# Patient Record
Sex: FEMALE | Race: WHITE | NOT HISPANIC OR LATINO | Employment: OTHER
[De-identification: names, ages, dates, MRNs, and addresses within clinical notes are randomized per-mention and may not be internally consistent; named-entity substitution may affect disease eponyms.]

---

## 2018-09-14 ENCOUNTER — RECORDS - HEALTHEAST (OUTPATIENT)
Dept: ADMINISTRATIVE | Facility: OTHER | Age: 68
End: 2018-09-14

## 2018-09-17 ASSESSMENT — MIFFLIN-ST. JEOR: SCORE: 1571.42

## 2018-09-19 ASSESSMENT — MIFFLIN-ST. JEOR: SCORE: 1571.42

## 2018-09-25 ENCOUNTER — SURGERY - HEALTHEAST (OUTPATIENT)
Dept: SURGERY | Facility: CLINIC | Age: 68
End: 2018-09-25

## 2018-09-25 ENCOUNTER — ANESTHESIA - HEALTHEAST (OUTPATIENT)
Dept: SURGERY | Facility: CLINIC | Age: 68
End: 2018-09-25

## 2018-09-25 ASSESSMENT — MIFFLIN-ST. JEOR
SCORE: 1570.51
SCORE: 1566.88

## 2018-11-09 ENCOUNTER — PRE VISIT (OUTPATIENT)
Dept: OTOLARYNGOLOGY | Facility: CLINIC | Age: 68
End: 2018-11-09

## 2018-11-09 NOTE — TELEPHONE ENCOUNTER
Patient is using an oral device for sleep but the appliance is not working well. Can't use a CPAP. Would like to see Dr Escoto to talk about options.  Demetra Mccarthy CMA ([facility])

## 2018-11-12 ENCOUNTER — OFFICE VISIT (OUTPATIENT)
Dept: OTOLARYNGOLOGY | Facility: CLINIC | Age: 68
End: 2018-11-12
Payer: COMMERCIAL

## 2018-11-12 VITALS — DIASTOLIC BLOOD PRESSURE: 87 MMHG | HEART RATE: 98 BPM | SYSTOLIC BLOOD PRESSURE: 128 MMHG

## 2018-11-12 DIAGNOSIS — G47.33 OSA (OBSTRUCTIVE SLEEP APNEA): Primary | ICD-10-CM

## 2018-11-12 PROCEDURE — 99202 OFFICE O/P NEW SF 15 MIN: CPT | Performed by: OTOLARYNGOLOGY

## 2018-11-12 RX ORDER — METOPROLOL SUCCINATE 100 MG/1
100 TABLET, EXTENDED RELEASE ORAL DAILY
COMMUNITY
Start: 2017-11-07

## 2018-11-12 RX ORDER — DEXTROAMPHETAMINE SACCHARATE, AMPHETAMINE ASPARTATE MONOHYDRATE, DEXTROAMPHETAMINE SULFATE AND AMPHETAMINE SULFATE 3.75; 3.75; 3.75; 3.75 MG/1; MG/1; MG/1; MG/1
15 CAPSULE, EXTENDED RELEASE ORAL
COMMUNITY
End: 2022-12-13

## 2018-11-12 RX ORDER — IMIPRAMINE HCL 25 MG
50 TABLET ORAL
COMMUNITY
End: 2022-12-14

## 2018-11-12 RX ORDER — POTASSIUM CHLORIDE 1500 MG/1
20 TABLET, EXTENDED RELEASE ORAL
COMMUNITY
Start: 2018-04-06 | End: 2022-12-14

## 2018-11-12 RX ORDER — LORAZEPAM 1 MG/1
1 TABLET ORAL DAILY PRN
COMMUNITY

## 2018-11-12 RX ORDER — AMLODIPINE BESYLATE 5 MG/1
5 TABLET ORAL EVERY EVENING
Status: ON HOLD | COMMUNITY
End: 2023-03-29

## 2018-11-12 RX ORDER — BUPROPION HYDROCHLORIDE 200 MG/1
200 TABLET, EXTENDED RELEASE ORAL DAILY
COMMUNITY

## 2018-11-12 RX ORDER — ALBUTEROL SULFATE 90 UG/1
2 AEROSOL, METERED RESPIRATORY (INHALATION)
COMMUNITY
End: 2022-12-14

## 2018-11-12 RX ORDER — MONTELUKAST SODIUM 10 MG/1
TABLET ORAL
COMMUNITY
Start: 2018-10-23 | End: 2022-12-14

## 2018-11-12 RX ORDER — TERAZOSIN 1 MG/1
1 CAPSULE ORAL
COMMUNITY
End: 2022-12-13

## 2018-11-12 RX ORDER — SODIUM SULFACETAMIDE 100 MG/ML
LIQUID TOPICAL DAILY PRN
Status: ON HOLD | COMMUNITY
End: 2022-12-16

## 2018-11-12 RX ORDER — TERAZOSIN 2 MG/1
2 CAPSULE ORAL
COMMUNITY
End: 2022-12-13

## 2018-11-12 RX ORDER — DEXTROAMPHETAMINE SACCHARATE, AMPHETAMINE ASPARTATE, DEXTROAMPHETAMINE SULFATE AND AMPHETAMINE SULFATE 3.75; 3.75; 3.75; 3.75 MG/1; MG/1; MG/1; MG/1
15 TABLET ORAL
COMMUNITY
End: 2018-11-12

## 2018-11-12 RX ORDER — OMEPRAZOLE AND SODIUM BICARBONATE 40; 1100 MG/1; MG/1
1 CAPSULE ORAL
COMMUNITY
Start: 2014-02-19

## 2018-11-12 RX ORDER — LOSARTAN POTASSIUM AND HYDROCHLOROTHIAZIDE 25; 100 MG/1; MG/1
1 TABLET ORAL
COMMUNITY
Start: 2017-11-07 | End: 2022-12-13

## 2018-11-12 RX ORDER — HYDROXYZINE PAMOATE 25 MG/1
25 CAPSULE ORAL
COMMUNITY
Start: 2018-09-27 | End: 2022-12-14

## 2018-11-12 RX ORDER — POLYETHYLENE GLYCOL 3350 17 G/17G
17 POWDER, FOR SOLUTION ORAL
COMMUNITY

## 2018-11-12 RX ORDER — ASPIRIN 81 MG/1
81 TABLET, CHEWABLE ORAL DAILY
Status: ON HOLD | COMMUNITY
End: 2022-12-16

## 2018-11-12 RX ORDER — FEXOFENADINE HCL 180 MG/1
180 TABLET ORAL
COMMUNITY
End: 2022-12-14

## 2018-11-12 RX ORDER — DULOXETIN HYDROCHLORIDE 30 MG/1
30 CAPSULE, DELAYED RELEASE ORAL
COMMUNITY
End: 2022-12-13

## 2018-11-12 RX ORDER — ARIPIPRAZOLE 2 MG/1
2 TABLET ORAL EVERY EVENING
COMMUNITY

## 2018-11-12 RX ORDER — TRIAMCINOLONE ACETONIDE 1 MG/G
CREAM TOPICAL 2 TIMES DAILY PRN
COMMUNITY
Start: 2017-10-05 | End: 2023-03-24

## 2018-11-12 RX ORDER — DULOXETIN HYDROCHLORIDE 60 MG/1
60 CAPSULE, DELAYED RELEASE ORAL
COMMUNITY
End: 2022-12-13

## 2018-11-12 RX ORDER — HYDROCORTISONE 25 MG/G
OINTMENT TOPICAL 2 TIMES DAILY PRN
COMMUNITY
Start: 2017-11-07

## 2018-11-12 ASSESSMENT — PAIN SCALES - GENERAL: PAINLEVEL: NO PAIN (0)

## 2018-11-12 NOTE — PROGRESS NOTES
Otolaryngology Adult Consultation    Patient: Katie Kramer  : 1950        HPI:  Katie Kramer is a 68 year old female seen today in the Otolaryngology Clinic for obstructive sleep apnea.  Patient was diagnosed with moderate obstructive sleep apnea on 2017.  This was done as a split-night study at [facility].  The overall AHI was 16.8.  She slept exclusively in the supine position.  Lowest oxygen saturation was 89%.  She had no central or mixed events.  Patient was diagnosed previous to this but this is her most recent study.  Patient declined CPAP because she has issues with claustrophobia as well as obsessive-compulsive disorder she felt like it would be incredibly difficult for her to try CPAP.  Therefore she was tried on an oral appliance.  She reports that the oral appliance has worked really well for her.  Repeat sleep study with oral appliance in place showed sick no significant sleep apnea.  However over time the oral appliance is not fitting on her lower teeth very well.  Therefore she is looking to try to get a new one made.  Her previous dentist has since retired.  This was Dr. Mara Vazquez.  Additionally the patient is on Medicare now.    Medications:  Current Outpatient Rx   Medication Sig Dispense Refill     hydrocortisone 2.5 % cream Apply bid prn to rash on thin skin areas.       hydrOXYzine (VISTARIL) 25 MG capsule Take 25 mg by mouth       losartan-hydrochlorothiazide (HYZAAR) 100-25 MG per tablet Take 1 tablet by mouth       metoprolol succinate (TOPROL-XL) 50 MG 24 hr tablet Take 25 mg by mouth       montelukast (SINGULAIR) 10 MG tablet TAKE 1 TABLET BY MOUTH DAILY AT BEDTIME       Multiple Vitamins-Minerals (PRESERVISION/LUTEIN PO)        omeprazole-sodium bicarbonate (ZEGERID)  MG per capsule Take 1 capsule by mouth       order for DME Equipment being ordered: Mandibular advancement device for treatment of moderate obstructive sleep apnea (AHI 16,  PSG 7/16/2017 at [facility]) 1 Units 0     potassium chloride SA (K-DUR/KLOR-CON M) 20 MEQ CR tablet Take 20 mEq by mouth       triamcinolone (KENALOG) 0.1 % cream to affected areas. Do not use on face, axilla or genital area       albuterol (PROAIR HFA/PROVENTIL HFA/VENTOLIN HFA) 108 (90 Base) MCG/ACT inhaler Inhale 2 puffs into the lungs       amLODIPine (NORVASC) 5 MG tablet Take 5 mg by mouth       amphetamine-dextroamphetamine (ADDERALL XR) 15 MG per 24 hr capsule Take 15 mg by mouth       ARIPiprazole (ABILIFY) 5 MG tablet Take 5 mg by mouth       ARMODAFINIL PO Take 15 mg by mouth       aspirin 81 MG chewable tablet Take 81 mg by mouth       buPROPion (WELLBUTRIN SR) 200 MG 12 hr tablet Take 200 mg by mouth       cholecalciferol (VITAMIN D-1000 MAX ST) 1000 units TABS Take 1,000 Units by mouth       DULoxetine (CYMBALTA) 30 MG EC capsule Take 30 mg by mouth       DULoxetine (CYMBALTA) 60 MG EC capsule Take 60 mg by mouth       fexofenadine (ALLEGRA) 180 MG tablet Take 180 mg by mouth       imipramine (TOFRANIL) 25 MG tablet Take 50 mg by mouth       LORazepam (ATIVAN) 1 MG tablet Take 1 mg by mouth       polyethylene glycol (MIRALAX/GLYCOLAX) powder Take 17 g by mouth       Sulfacetamide Sodium 10 % LIQD        terazosin (HYTRIN) 1 MG capsule Take 1 mg by mouth       terazosin (HYTRIN) 2 MG capsule Take 2 mg by mouth         Allergies: Seasonal allergies     PMH:  Past Medical History:   Diagnosis Date     Anxiety      Arthritis      Chronic fatigue      Eosinophilic esophagitis      GERD (gastroesophageal reflux disease)      Hypertension      Macular degeneration      Perspiration excessive      Rosacea      Sleep apnea      Uncomplicated asthma        PSH:  Past Surgical History:   Procedure Laterality Date     CARPAL TUNNEL RELEASE RT/LT      knee replacement     GYN SURGERY      hysterectomy     PARATHYROIDECTOMY         FH:  History reviewed. No pertinent family history.     SH:  Social History    Substance Use Topics     Smoking status: Never Smoker     Smokeless tobacco: Never Used     Alcohol use No       Review of Systems  No flowsheet data found.    Physical Exam:    GEN:  The patient is alert, oriented and in no acute distress.    Assessment/Plan: Patient has moderate obstructive sleep apnea that responds well to oral appliance therapy.  I did make a referral to sleep dentistry for her today.  There are several dentist who do take Medicare and I provided her with a list today.  At this time I would recommend that she continue with oral appliance therapy since he has proven to be effective for her in the past.  She may follow-up with me as needed.    I spent a total of 25 minutes face-to-face with Katie Kramer during today's office visit.  Over 50% of this time was spent counseling the patient on and/or coordinating care as documented in my assessment and plan.

## 2018-11-12 NOTE — LETTER
2018         RE: Katie Kramer  [address] [place] [address]   [facility] [address]        Dear Colleague,    Thank you for referring your patient, Katie Kramer, to the [facility]. Please see a copy of my visit note below.          Otolaryngology Adult Consultation    Patient: Katie Kramer  : 1950        HPI:  Katie Kramer is a 68 year old female seen today in the Otolaryngology Clinic for obstructive sleep apnea.  Patient was diagnosed with moderate obstructive sleep apnea on 2017.  This was done as a split-night study at [facility].  The overall AHI was 16.8.  She slept exclusively in the supine position.  Lowest oxygen saturation was 89%.  She had no central or mixed events.  Patient was diagnosed previous to this but this is her most recent study.  Patient declined CPAP because she has issues with claustrophobia as well as obsessive-compulsive disorder she felt like it would be incredibly difficult for her to try CPAP.  Therefore she was tried on an oral appliance.  She reports that the oral appliance has worked really well for her.  Repeat sleep study with oral appliance in place showed sick no significant sleep apnea.  However over time the oral appliance is not fitting on her lower teeth very well.  Therefore she is looking to try to get a new one made.  Her previous dentist has since retired.  This was Dr. Mara Vazquez.  Additionally the patient is on Medicare now.    Medications:  Current Outpatient Rx   Medication Sig Dispense Refill     hydrocortisone 2.5 % cream Apply bid prn to rash on thin skin areas.       hydrOXYzine (VISTARIL) 25 MG capsule Take 25 mg by mouth       losartan-hydrochlorothiazide (HYZAAR) 100-25 MG per tablet Take 1 tablet by mouth       metoprolol succinate (TOPROL-XL) 50 MG 24 hr tablet Take 25 mg by mouth       montelukast (SINGULAIR) 10 MG tablet TAKE 1 TABLET BY MOUTH DAILY AT BEDTIME       Multiple Vitamins-Minerals  (PRESERVISION/LUTEIN PO)        omeprazole-sodium bicarbonate (ZEGERID)  MG per capsule Take 1 capsule by mouth       order for DME Equipment being ordered: Mandibular advancement device for treatment of moderate obstructive sleep apnea (AHI 16, PSG 7/16/2017 at [facility]) 1 Units 0     potassium chloride SA (K-DUR/KLOR-CON M) 20 MEQ CR tablet Take 20 mEq by mouth       triamcinolone (KENALOG) 0.1 % cream to affected areas. Do not use on face, axilla or genital area       albuterol (PROAIR HFA/PROVENTIL HFA/VENTOLIN HFA) 108 (90 Base) MCG/ACT inhaler Inhale 2 puffs into the lungs       amLODIPine (NORVASC) 5 MG tablet Take 5 mg by mouth       amphetamine-dextroamphetamine (ADDERALL XR) 15 MG per 24 hr capsule Take 15 mg by mouth       ARIPiprazole (ABILIFY) 5 MG tablet Take 5 mg by mouth       ARMODAFINIL PO Take 15 mg by mouth       aspirin 81 MG chewable tablet Take 81 mg by mouth       buPROPion (WELLBUTRIN SR) 200 MG 12 hr tablet Take 200 mg by mouth       cholecalciferol (VITAMIN D-1000 MAX ST) 1000 units TABS Take 1,000 Units by mouth       DULoxetine (CYMBALTA) 30 MG EC capsule Take 30 mg by mouth       DULoxetine (CYMBALTA) 60 MG EC capsule Take 60 mg by mouth       fexofenadine (ALLEGRA) 180 MG tablet Take 180 mg by mouth       imipramine (TOFRANIL) 25 MG tablet Take 50 mg by mouth       LORazepam (ATIVAN) 1 MG tablet Take 1 mg by mouth       polyethylene glycol (MIRALAX/GLYCOLAX) powder Take 17 g by mouth       Sulfacetamide Sodium 10 % LIQD        terazosin (HYTRIN) 1 MG capsule Take 1 mg by mouth       terazosin (HYTRIN) 2 MG capsule Take 2 mg by mouth         Allergies: Seasonal allergies     PMH:  Past Medical History:   Diagnosis Date     Anxiety      Arthritis      Chronic fatigue      Eosinophilic esophagitis      GERD (gastroesophageal reflux disease)      Hypertension      Macular degeneration      Perspiration excessive      Rosacea      Sleep apnea      Uncomplicated asthma         PSH:  Past Surgical History:   Procedure Laterality Date     CARPAL TUNNEL RELEASE RT/LT      knee replacement     GYN SURGERY      hysterectomy     PARATHYROIDECTOMY         FH:  History reviewed. No pertinent family history.     SH:  Social History   Substance Use Topics     Smoking status: Never Smoker     Smokeless tobacco: Never Used     Alcohol use No       Review of Systems  No flowsheet data found.    Physical Exam:    GEN:  The patient is alert, oriented and in no acute distress.    Assessment/Plan: Patient has moderate obstructive sleep apnea that responds well to oral appliance therapy.  I did make a referral to sleep dentistry for her today.  There are several dentist who do take Medicare and I provided her with a list today.  At this time I would recommend that she continue with oral appliance therapy since he has proven to be effective for her in the past.  She may follow-up with me as needed.    I spent a total of 25 minutes face-to-face with Katie Kramer during today's office visit.  Over 50% of this time was spent counseling the patient on and/or coordinating care as documented in my assessment and plan.          Again, thank you for allowing me to participate in the care of your patient.        Sincerely,        Kya Escoto MD

## 2018-11-12 NOTE — MR AVS SNAPSHOT
After Visit Summary   11/12/2018    Katie Kramer    MRN: [id number]           Patient Information     Date Of Birth          1950        Visit Information        Provider Department      11/12/2018 12:45 PM Kya Escoto MD [facility]        Today's Diagnoses     KERLINE (obstructive sleep apnea)    -  1       Follow-ups after your visit        Additional Services     SLEEP DENTAL REFERRAL       Dental appliance resources recommended by [facility]     Below is a list of Medicare-approved dental appliance resources recommended by [facility].  If you wish to choose your own dental sleep dentist, you may identify a provider close to you: [email or web address]    [facility]   Office 1   Jamir Schultz DDS - [DME Medicare]  [address]   [place] [address]  Appointments: [phone]  Fax: [phone]  Website: [employer]    [facility]   Office 2  Jamir Schultz DDS - [DME Medicare]  [address]  [place] [address]  Appointments: [phone]  Fax: [phone]  Website: [employer]    [facility]   [place] Office   Austen Montoya DDS, MS - [DME Medicare]  [facility]   [address], [address]   [place] [address]   Appointments: [phone]   Fax: [phone]   Website: [employer]     Chad Zaragoza DMD, MSD - [DME Medicare]  [address], [address]  [place] [address]  Appointments [phone]  Fax: [phone]  Website: [email or web address]    If you wish to choose your own dental sleep dentist, you may identify a provider close to you: [email or web address]      AHI: 16.8 PSG DATE: 7/16/2017     Please refer patient to sleep medicine for home sleep study if needed.    Other information regarding this referral: Mandibular repositioning appliance  for KERLINE. If your insurance asks for a CPT code, it is .    Please be aware that coverage of these services is subject to the terms and limitations of your health insurance plan.  Call member services at your health plan with any benefit or coverage questions.      Please bring the following to your appointment:    >>   List of current medications   >>   This referral request   >>   Any documents/labs given to you for this referral                  Who to contact     If you have questions or need follow up information about today's clinic visit or your schedule please contact [facility] directly at [phone].  Normal or non-critical lab and imaging results will be communicated to you by [employer], letter or phone within 4 business days after the clinic has received the results. If you do not hear from us within 7 days, please contact the clinic through [employer] or phone. If you have a critical or abnormal lab result, we will notify you by phone as soon as possible.  Submit refill requests through [employer] or call your pharmacy and they will forward the refill request to us. Please allow 3 business days for your refill to be completed.          Additional Information About Your Visit        [employer] Information     [employer] is an electronic gateway that provides easy, online access to your medical records. With [employer], you can request a clinic appointment, read your test results, renew a prescription or communicate with your care team.     To sign up for [employer] visit the website at [email or web address]   You will be asked to enter the access code listed below, as well as some personal information. Please follow the directions to create your username and password.     Your access code is: [email or web address]  Expires: 2/10/2019  1:05 PM     Your access code will  in 90 days. If you need help or a new code, please contact your [facility] or call [phone] for  assistance.        Care EveryWhere ID     This is your Care EveryWhere ID. This could be used by other organizations to access your [facility] medical records  [id number]        Your Vitals Were     Pulse                   98            Blood Pressure from Last 3 Encounters:   11/12/18 128/87    Weight from Last 3 Encounters:   No data found for Wt              We Performed the Following     SLEEP DENTAL REFERRAL        Primary Care Provider Office Phone # Fax #    Aleksandra Crump, [phone]       [facility] [address]  [facility] [place] [address]        Equal Access to Services     [facility]: Hadii aad ku hadasho Soomaali, waaxda luqadaha, qaybta kaalmada adeegyada, waxay idiin hayaan adeeg kharaduncan lacecil . So [facility] [phone].    ATENCIÓN: Si habla español, tiene a reynolds disposición servicios gratuitos de asistencia lingüística. [facility] [phone].    We comply with applicable federal civil rights laws and [place] laws. We do not discriminate on the basis of race, color, national origin, age, disability, sex, sexual orientation, or gender identity.            Thank you!     Thank you for choosing [facility]  for your care. Our goal is always to provide you with excellent care. Hearing back from our patients is one way we can continue to improve our services. Please take a few minutes to complete the written survey that you may receive in the mail after your visit with us. Thank you!             Your Updated Medication List - Protect others around you: Learn how to safely use, store and throw away your medicines at www.disposemymeds.org.          This list is accurate as of 11/12/18  1:05 PM.  Always use your most recent med list.                   Brand Name Dispense Instructions for use Diagnosis    albuterol 108 (90 Base) MCG/ACT inhaler    PROAIR HFA/PROVENTIL HFA/VENTOLIN HFA     Inhale 2 puffs into the lungs        amLODIPine 5 MG tablet     NORVASC     Take 5 mg by mouth        amphetamine-dextroamphetamine 15 MG per 24 hr capsule    ADDERALL XR     Take 15 mg by mouth        ARIPiprazole 5 MG tablet    ABILIFY     Take 5 mg by mouth        ARMODAFINIL PO      Take 15 mg by mouth        aspirin 81 MG chewable tablet      Take 81 mg by mouth        buPROPion 200 MG 12 hr tablet    WELLBUTRIN SR     Take 200 mg by mouth        * DULoxetine 30 MG EC capsule    CYMBALTA     Take 30 mg by mouth        * DULoxetine 60 MG EC capsule    CYMBALTA     Take 60 mg by mouth        fexofenadine 180 MG tablet    ALLEGRA     Take 180 mg by mouth        hydrocortisone 2.5 % cream      Apply bid prn to rash on thin skin areas.        imipramine 25 MG tablet    TOFRANIL     Take 50 mg by mouth        LORazepam 1 MG tablet    ATIVAN     Take 1 mg by mouth        losartan-hydrochlorothiazide 100-25 MG per tablet    HYZAAR     Take 1 tablet by mouth        metoprolol succinate 50 MG 24 hr tablet    TOPROL-XL     Take 25 mg by mouth        montelukast 10 MG tablet    SINGULAIR     TAKE 1 TABLET BY MOUTH DAILY AT BEDTIME        omeprazole-sodium bicarbonate  MG per capsule    ZEGERID     Take 1 capsule by mouth        polyethylene glycol powder    MIRALAX/GLYCOLAX     Take 17 g by mouth        potassium chloride SA 20 MEQ CR tablet    K-DUR/KLOR-CON M     Take 20 mEq by mouth        PRESERVISION/LUTEIN PO           Sulfacetamide Sodium 10 % Liqd           * terazosin 1 MG capsule    HYTRIN     Take 1 mg by mouth        * terazosin 2 MG capsule    HYTRIN     Take 2 mg by mouth        triamcinolone 0.1 % cream    KENALOG     to affected areas. Do not use on face, axilla or genital area        VISTARIL 25 MG capsule   Generic drug:  hydrOXYzine      Take 25 mg by mouth        VITAMIN D-1000 MAX ST 1000 units Tabs   Generic drug:  cholecalciferol      Take 1,000 Units by mouth        * Notice:  This list has 4 medication(s) that are the same as other medications prescribed  for you. Read the directions carefully, and ask your doctor or other care provider to review them with you.

## 2018-11-16 ENCOUNTER — RECORDS - HEALTHEAST (OUTPATIENT)
Dept: ADMINISTRATIVE | Facility: OTHER | Age: 68
End: 2018-11-16

## 2018-11-20 ASSESSMENT — MIFFLIN-ST. JEOR: SCORE: 1575.95

## 2018-11-27 ENCOUNTER — SURGERY - HEALTHEAST (OUTPATIENT)
Dept: SURGERY | Facility: CLINIC | Age: 68
End: 2018-11-27

## 2018-11-27 ENCOUNTER — ANESTHESIA - HEALTHEAST (OUTPATIENT)
Dept: SURGERY | Facility: CLINIC | Age: 68
End: 2018-11-27

## 2018-11-27 ASSESSMENT — MIFFLIN-ST. JEOR
SCORE: 1575.95
SCORE: 1575.95

## 2021-06-02 VITALS — WEIGHT: 235 LBS | BODY MASS INDEX: 40.12 KG/M2 | HEIGHT: 64 IN

## 2021-06-02 VITALS — WEIGHT: 233 LBS | BODY MASS INDEX: 39.78 KG/M2 | HEIGHT: 64 IN

## 2021-06-05 ENCOUNTER — RECORDS - HEALTHEAST (OUTPATIENT)
Dept: SCHEDULING | Facility: CLINIC | Age: 71
End: 2021-06-05

## 2021-06-05 DIAGNOSIS — R07.9 CHEST PAIN: ICD-10-CM

## 2021-06-07 ENCOUNTER — RECORDS - HEALTHEAST (OUTPATIENT)
Dept: ADMINISTRATIVE | Facility: OTHER | Age: 71
End: 2021-06-07

## 2021-06-07 ASSESSMENT — MIFFLIN-ST. JEOR: SCORE: 1636.74

## 2021-06-08 ENCOUNTER — SURGERY - HEALTHEAST (OUTPATIENT)
Dept: CARDIOLOGY | Facility: HOSPITAL | Age: 71
End: 2021-06-08

## 2021-06-08 ENCOUNTER — RECORDS - HEALTHEAST (OUTPATIENT)
Dept: ADMINISTRATIVE | Facility: OTHER | Age: 71
End: 2021-06-08

## 2021-06-08 ENCOUNTER — COMMUNICATION - HEALTHEAST (OUTPATIENT)
Dept: SCHEDULING | Facility: CLINIC | Age: 71
End: 2021-06-08

## 2021-06-08 ASSESSMENT — MIFFLIN-ST. JEOR
SCORE: 1628.12
SCORE: 1632.2
SCORE: 1628.12

## 2021-06-09 ENCOUNTER — RECORDS - HEALTHEAST (OUTPATIENT)
Dept: ADMINISTRATIVE | Facility: OTHER | Age: 71
End: 2021-06-09

## 2021-06-09 ASSESSMENT — MIFFLIN-ST. JEOR: SCORE: 1626.31

## 2021-06-10 ENCOUNTER — RECORDS - HEALTHEAST (OUTPATIENT)
Dept: ADMINISTRATIVE | Facility: OTHER | Age: 71
End: 2021-06-10

## 2021-06-10 ASSESSMENT — MIFFLIN-ST. JEOR: SCORE: 1605.44

## 2021-06-15 ENCOUNTER — COMMUNICATION - HEALTHEAST (OUTPATIENT)
Dept: CARDIOLOGY | Facility: CLINIC | Age: 71
End: 2021-06-15

## 2021-06-15 DIAGNOSIS — I47.20 VENTRICULAR TACHYCARDIA (H): ICD-10-CM

## 2021-06-20 NOTE — ANESTHESIA CARE TRANSFER NOTE
Last vitals:   Vitals:    09/25/18 1537   BP: 131/79   Pulse: 94   Resp: 16   Temp: 36.7  C (98  F)   SpO2: 100%     Patient's level of consciousness is awake  Spontaneous respirations: yes  Maintains airway independently: yes  Dentition unchanged: yes  Oropharynx: oropharynx clear of all foreign objects    QCDR Measures:  ASA# 20 - Surgical Safety Checklist: WHO surgical safety checklist completed prior to induction  PQRS# 430 - Adult PONV Prevention: 4558F - Pt received => 2 anti-emetic agents (different classes) preop & intraop  ASA# 8 - Peds PONV Prevention: NA - Not pediatric patient, not GA or 2 or more risk factors NOT present  PQRS# 424 - Madeline-op Temp Management: 4559F - At least one body temp DOCUMENTED => 35.5C or 95.9F within required timeframe  PQRS# 426 - PACU Transfer Protocol: - Transfer of care checklist used  ASA# 14 - Acute Post-op Pain: ASA14B - Patient did NOT experience pain >= 7 out of 10

## 2021-06-20 NOTE — ANESTHESIA PROCEDURE NOTES
Peripheral Block    Patient location during procedure: pre-op  Start time: 9/25/2018 12:47 PM  End time: 9/25/2018 12:51 PM  post-op analgesia per surgeon order as noted in medical record  Staffing:  Performing  Anesthesiologist: MICKY CHEUNG  Preanesthetic Checklist  Completed: patient identified, site marked, risks, benefits, and alternatives discussed, timeout performed, consent obtained, airway assessed, oxygen available, suction available, emergency drugs available and hand hygiene performed  Peripheral Block  Block type: saphenous, adductor canal block  Prep: ChloraPrep  Patient position: supine  Patient monitoring: cardiac monitor, continuous pulse oximetry, heart rate and blood pressure  Laterality: left  Injection technique: ultrasound guided    Ultrasound used to visualize needle placement in proximity to nerve being blocked: yes   Permanent ultrasound image captured for medical record  Sterile gel and probe cover used for ultrasound.    Needle  Needle type: Stimuplex   Needle gauge: 21 G  Needle length: 4 in  no peripheral nerve catheter placed  Assessment  Injection assessment: no difficulty with injection, negative aspiration for heme, no paresthesia on injection and incremental injection

## 2021-06-20 NOTE — ANESTHESIA PREPROCEDURE EVALUATION
Anesthesia Evaluation      History of anesthetic complications     Airway   Mallampati: II  Neck ROM: full   Pulmonary - normal exam   (+) asthma   well controlled, sleep apnea on no CPAP, ,     ROS comment: Mild KERLINE with home use of oral appliance.                         Cardiovascular - normal exam  (+) hypertension, ,   Received beta blockers in last 24 hours prior to incision.  ,   Neuro/Psych - negative ROS     Endo/Other - negative ROS      GI/Hepatic/Renal - negative ROS           Dental - normal exam                        Anesthesia Plan  Planned anesthetic: spinal and peripheral nerve block    ASA 3     Anesthetic plan and risks discussed with: patient    Post-op plan: routine recovery

## 2021-06-20 NOTE — ANESTHESIA PROCEDURE NOTES
Spinal Block    Patient location during procedure: OR  Start time: 9/25/2018 1:40 PM  End time: 9/25/2018 1:48 PM  Reason for block: primary anesthetic    Staffing:  Performing  Anesthesiologist: MICKY CHEUNG    Preanesthetic Checklist  Completed: patient identified, risks, benefits, and alternatives discussed, timeout performed, consent obtained, airway assessed, oxygen available, suction available, emergency drugs available and hand hygiene performed  Spinal Block  Patient position: sitting  Prep: ChloraPrep  Patient monitoring: heart rate, cardiac monitor, continuous pulse ox and blood pressure  Approach: midline  Location: L2-3  Injection technique: single-shot  Needle type: pencil-tip   Needle gauge: 25 G

## 2021-06-20 NOTE — ANESTHESIA POSTPROCEDURE EVALUATION
Patient: Katie Kramer  LEFT TOTAL KNEE ARTHROPLASTY  Anesthesia type: spinal    Patient location: PACU  Last vitals:   Vitals:    09/25/18 1645   BP: 122/75   Pulse: 85   Resp: 16   Temp: 36.4  C (97.5  F)   SpO2: 94%     Post vital signs: stable  Level of consciousness: awake, alert, oriented and responds to simple questions  Post-anesthesia pain: pain controlled  Post-anesthesia nausea and vomiting: no  Pulmonary: unassisted, return to baseline  Cardiovascular: stable and blood pressure at baseline  Hydration: adequate  Anesthetic events: no    QCDR Measures:  ASA# 11 - Madeline-op Cardiac Arrest: ASA11B - Patient did NOT experience unanticipated cardiac arrest  ASA# 12 - Madeline-op Mortality Rate: ASA12B - Patient did NOT die  ASA# 13 - PACU Re-Intubation Rate: NA - No ETT / LMA used for case  ASA# 10 - Composite Anes Safety: ASA10A - No serious adverse event    Additional Notes:

## 2021-06-21 NOTE — ANESTHESIA PREPROCEDURE EVALUATION
Anesthesia Evaluation      Patient summary reviewed   History of anesthetic complications (ponv)     Airway   Mallampati: II   Pulmonary - normal exam   (+) asthma  mild,  well controlled, sleep apnea on CPAP, ,                          Cardiovascular - normal exam  Exercise tolerance: > or = 4 METS  (+) hypertension well controlled, , hypercholesterolemia, PVD    ECG reviewed (NSR)  Rhythm: regular  Rate: normal,         Neuro/Psych - negative ROS     Comments: OCD    Endo/Other    (+) obesity,      GI/Hepatic/Renal    (+) GERD well controlled,        Other findings: Hgb=11.7  Ndc=279  K=4.2  Results for STEVE MANCIA (MRN [id number]) as of 11/27/2018 09:38    9/27/2018 06:11  Sodium: 136  Potassium: 4.1  Chloride: 99  CO2: 31  Anion Gap, Calculation: 6  BUN: 16  Creatinine: 0.74  GFR MDRD Af Amer: >60  GFR MDRD Non Af Amer: >60  Calcium: 9.2  Glucose: 99  Hemoglobin: 9.7 (L)        Dental - normal exam                        Anesthesia Plan  Planned anesthetic: spinal  Scopolamine  Selective tibial/saphenous nerve block for POP  Spinal anesthesia  Propofol infusion    ASA 2     Anesthetic plan and risks discussed with: patient  Anesthesia plan special considerations: antiemetics,   Post-op plan: routine recovery

## 2021-06-21 NOTE — ANESTHESIA PROCEDURE NOTES
Spinal Block    Patient location during procedure: OR  Start time: 11/27/2018 12:34 PM  End time: 11/27/2018 12:39 PM  Reason for block: primary anesthetic    Staffing:  Performing  Anesthesiologist: Catarino Bowles MD    Preanesthetic Checklist  Completed: patient identified, risks, benefits, and alternatives discussed, timeout performed, consent obtained, airway assessed, oxygen available, suction available, emergency drugs available and hand hygiene performed  Spinal Block  Patient position: sitting  Prep: ChloraPrep  Patient monitoring: heart rate, cardiac monitor, continuous pulse ox and blood pressure  Location: L3-4  Injection technique: single-shot  Needle type: pencil-tip   Needle gauge: 24 G

## 2021-06-21 NOTE — ANESTHESIA PROCEDURE NOTES
Peripheral Block    Patient location during procedure: pre-op  Start time: 11/27/2018 11:14 AM  End time: 11/27/2018 11:16 AM  post-op analgesia per surgeon order as noted in medical record  Staffing:  Performing  Anesthesiologist: Catarino Bowles MD  Preanesthetic Checklist  Completed: patient identified, site marked, risks, benefits, and alternatives discussed, timeout performed, consent obtained, airway assessed, oxygen available, suction available, emergency drugs available and hand hygiene performed  Peripheral Block  Block type: saphenous, adductor canal block  Prep: ChloraPrep  Patient position: supine  Patient monitoring: cardiac monitor, continuous pulse oximetry, heart rate and blood pressure  Laterality: right  Injection technique: ultrasound guided            Needle  Needle type: Stimuplex   Needle gauge: 20G  Needle length: 4 in  no peripheral nerve catheter placed  Assessment  Injection assessment: no difficulty with injection, negative aspiration for heme, no paresthesia on injection and incremental injection

## 2021-06-21 NOTE — ANESTHESIA CARE TRANSFER NOTE
Last vitals:   Vitals:    11/27/18 1426   BP: 121/65   Pulse: (!) 105   Resp: 16   Temp: 37.2  C (99  F)   SpO2: 100%     Patient's level of consciousness is awake  Spontaneous respirations: yes  Maintains airway independently: yes  Dentition unchanged: yes  Oropharynx: oropharynx clear of all foreign objects    QCDR Measures:  ASA# 20 - Surgical Safety Checklist: WHO surgical safety checklist completed prior to induction    PQRS# 430 - Adult PONV Prevention: 4558F - Pt received => 2 anti-emetic agents (different classes) preop & intraop  ASA# 8 - Peds PONV Prevention: NA - Not pediatric patient, not GA or 2 or more risk factors NOT present  PQRS# 424 - Madeline-op Temp Management: 4559F - At least one body temp DOCUMENTED => 35.5C or 95.9F within required timeframe  PQRS# 426 - PACU Transfer Protocol: - Transfer of care checklist used  ASA# 14 - Acute Post-op Pain: ASA14B - Patient did NOT experience pain >= 7 out of 10

## 2021-06-21 NOTE — ANESTHESIA PROCEDURE NOTES
Peripheral Block    Patient location during procedure: pre-op  Start time: 11/27/2018 11:11 AM  End time: 11/27/2018 11:13 AM  post-op analgesia per surgeon order as noted in medical record  Staffing:  Performing  Anesthesiologist: Catarino Bowles MD  Preanesthetic Checklist  Completed: patient identified, site marked, risks, benefits, and alternatives discussed, timeout performed, consent obtained, airway assessed, oxygen available, suction available, emergency drugs available and hand hygiene performed  Peripheral Block  Block type: other, tibial  Prep: ChloraPrep  Patient position: supine  Patient monitoring: cardiac monitor, continuous pulse oximetry, heart rate and blood pressure  Laterality: right  Injection technique: ultrasound guided            Needle  Needle type: Stimuplex   Needle gauge: 20G  Needle length: 4 in  no peripheral nerve catheter placed  Assessment  Injection assessment: no difficulty with injection, negative aspiration for heme, no paresthesia on injection and incremental injection

## 2021-06-21 NOTE — ANESTHESIA POSTPROCEDURE EVALUATION
Patient: Katie Kramer  RIGHT TOTAL KNEE ARTHROPLASTY  Anesthesia type: spinal    Patient location: PACU  Last vitals:   Vitals:    11/27/18 1915   BP: 133/85   Pulse: 98   Resp: 16   Temp: 36.8  C (98.3  F)   SpO2: 97%     Post vital signs: stable  Level of consciousness: awake and responds to simple questions  Post-anesthesia pain: pain controlled  Post-anesthesia nausea and vomiting: no  Pulmonary: unassisted, return to baseline  Cardiovascular: stable and blood pressure at baseline  Hydration: adequate  Anesthetic events: no    QCDR Measures:  ASA# 11 - Madeline-op Cardiac Arrest: ASA11B - Patient did NOT experience unanticipated cardiac arrest  ASA# 12 - Madeline-op Mortality Rate: ASA12B - Patient did NOT die  ASA# 13 - PACU Re-Intubation Rate: ASA13B - Patient did NOT require a new airway mgmt  ASA# 10 - Composite Anes Safety: ASA10A - No serious adverse event    Additional Notes:

## 2021-06-25 ENCOUNTER — RECORDS - HEALTHEAST (OUTPATIENT)
Dept: ADMINISTRATIVE | Facility: OTHER | Age: 71
End: 2021-06-25

## 2021-06-25 ENCOUNTER — AMBULATORY - HEALTHEAST (OUTPATIENT)
Dept: CARDIOLOGY | Facility: CLINIC | Age: 71
End: 2021-06-25

## 2021-06-25 NOTE — TELEPHONE ENCOUNTER
----- Message from Keke Snowden sent at 6/15/2021  2:07 PM CDT -----      Caller: Patient  Primary cardiologist: Dr. Martinez    Detailed reason for call: Patient was told she needed to be seen in 2 weeks with Dr. Martinez but he has absolutely no schedule. Please advise and see if there is a way to put her in with him.    Best phone number: [phone]  Best time to contact: today  Ok to leave a detailed message? yes  Device? No       WTZ consult 6/10:  Plan:  She can be discharged home on metoprolol  Event monitor for 2 weeks  Follow-up with EP here or at [facility] if she wishes to    Attempted to call patient x2, busy signal received. Unable to leave VM. Will try again later. -kcl

## 2021-06-25 NOTE — TELEPHONE ENCOUNTER
Received a call back from Katie. Writer explained that LYLY recommends that she see an EP doc in consult as she was hospitalized for rhythm concerns. She verbalized understanding and she was updated that writer will still call with her PRICE monitor results when they are available and she may yet still follow up with Dr. Martinez down the line, it is just more beneficial for her at this time to follow up with the rhythm specialists. She thanked writer for explaining. -gage

## 2021-06-25 NOTE — TELEPHONE ENCOUNTER
Called patient and LM on line to clarify scheduling concerns. Per chart review- pt should follow up with EP for extensive workup, not Dr. Martinez at this time. -[facility]

## 2021-07-06 VITALS
WEIGHT: 242.6 LBS | BODY MASS INDEX: 40.75 KG/M2 | WEIGHT: 243.9 LBS | WEIGHT: 238 LBS | WEIGHT: 243 LBS | BODY MASS INDEX: 40.75 KG/M2 | BODY MASS INDEX: 40.59 KG/M2 | HEIGHT: 65 IN | HEIGHT: 65 IN | BODY MASS INDEX: 40.44 KG/M2 | WEIGHT: 244.9 LBS | BODY MASS INDEX: 40.37 KG/M2 | BODY MASS INDEX: 39.61 KG/M2 | BODY MASS INDEX: 39.11 KG/M2 | BODY MASS INDEX: 40.44 KG/M2 | WEIGHT: 243 LBS | WEIGHT: 235 LBS | BODY MASS INDEX: 40.44 KG/M2 | BODY MASS INDEX: 40.44 KG/M2 | HEIGHT: 65 IN

## 2021-07-31 ENCOUNTER — HEALTH MAINTENANCE LETTER (OUTPATIENT)
Age: 71
End: 2021-07-31

## 2021-09-25 ENCOUNTER — HEALTH MAINTENANCE LETTER (OUTPATIENT)
Age: 71
End: 2021-09-25

## 2022-01-12 VITALS — WEIGHT: 238 LBS | HEIGHT: 65 IN | BODY MASS INDEX: 39.65 KG/M2

## 2022-08-27 ENCOUNTER — HEALTH MAINTENANCE LETTER (OUTPATIENT)
Age: 72
End: 2022-08-27

## 2022-11-28 RX ORDER — ATORVASTATIN CALCIUM 40 MG/1
40 TABLET, FILM COATED ORAL EVERY EVENING
COMMUNITY
End: 2023-03-24

## 2022-11-28 RX ORDER — SPIRONOLACTONE 25 MG/1
25 TABLET ORAL DAILY
COMMUNITY

## 2022-12-05 NOTE — PROVIDER NOTIFICATION
Discharge plan according to [facility]:       11/28/22 1527   Discharge Planning   Patient/Family Anticipates Transition to home   Concerns to be Addressed all concerns addressed in this encounter   Living Arrangements   People in Home spouse   Type of Residence Private Residence   Is your private residence a single family home or apartment? Single family home   Stair Railings, Within Home, Primary railings safe and in good condition   Bathroom Shower/Tub Walk-in shower   Equipment Currently Used at Home none   Support System   Support Systems Spouse/Significant Other   Do you have someone available to stay with you one or two nights once you are home? Yes

## 2022-12-13 RX ORDER — ZOLPIDEM TARTRATE 10 MG/1
10 TABLET ORAL
COMMUNITY

## 2022-12-13 RX ORDER — FERROUS SULFATE 325(65) MG
325 TABLET, DELAYED RELEASE (ENTERIC COATED) ORAL EVERY EVENING
COMMUNITY

## 2022-12-13 RX ORDER — LOSARTAN POTASSIUM 25 MG/1
25 TABLET ORAL EVERY EVENING
Status: ON HOLD | COMMUNITY
End: 2023-03-29

## 2022-12-14 RX ORDER — SERTRALINE HYDROCHLORIDE 100 MG/1
200 TABLET, FILM COATED ORAL DAILY
COMMUNITY

## 2022-12-14 NOTE — PROGRESS NOTES
I am evaluating this patient for upcoming Revision of Right Total Knee Arthroplasty with Dr. Roberson at [facility] on 12/16/22:    - Patient had recent hyperkalemia with potassium of 5.3 when checked at PCP clinic on 12/12/22. On review of labs patient has had intermittent hyperkalemia before and is on the potassium sparing diuretic spironolactone. Spoke to patient today, her PCP noted the hyperkalemia but didn't have any further recommendations other than having a follow up potassium re-check in a couple weeks.  I advised patient to hold spironolactone from now until after surgery and to avoid potassium rich foods for the next couple days. She confirmed understanding of this plan and will not take spironolactone starting today. We will re-check potassium in preop on day of surgery. Full Treatment Plan note to follow.        WINTER Henao, CNP   Advanced Practice Nurse Navigator- Orthopedics  [facility]   Office Phone: [phone]  Direct Fax: [phone]

## 2022-12-15 NOTE — TREATMENT PLAN
Orthopedic Surgery Pre-Op Plan: Katie Kramer  pre-op review. This is NOT an H&P   Surgeon: Dr. Roberson   [facility]  Name of Surgery: Revision of Right Total Knee Arthroplasty  Date of Surgery: 12/16/22  H&P: Completed on 11/23/22 by WINTER Sheldon, CNP at [facility].   History of ASA, NSAIDS, vitamin and/or herbal supplements within 10 days: Yes- ASA 81 mg daily, Multivitamins- patient instructed to hold these medications for 7 days before surgery.   History of blood thinners: No    Plan:   1) Discharge Plan: Home POD 1 or POD 2 when medically stable for discharge with assist of Spouse. Please see Discharge Planning section near bottom of this note for further details.     2) Hyperkalemia: potassium of 5.3 at PCP clinic on 12/12/22. On review of labs patient has had intermittent hyperkalemia before and is on potassium sparing diuretic spironolactone. Spoke to patient, I advised patient to hold spironolactone from now until after surgery and to avoid potassium rich foods for the next couple days. She confirmed understanding of this plan. We will re-check potassium in preop on day of surgery.     3) Heart Failure with Preserved Ejection Fraction (HFpEF): Follows closely with Cardiology at [facility] and is part of study there evaluating use of empagliflozin (Jardiance) in heart failure patients. Patient has some chronic shortness of breath related to heart failure that has actually improved somewhat on the study drug. Able to perform > 4 METS. No history of any recent heart failure exacerbations. Monitors weight and salt intake. Most recent Echo 11/7/22: showed normal left ventricular chamber size.  Normal left ventricular geometry.  Left ventricular ejection fraction 65%.  No regional wall motion abnormalities.  Normal left ventricular filling pressure, at rest. Full Echo report available in Care Everywhere. Cleared by PCP for surgery at intermediate risk for  serious periop cardiovascular complications. I recommend close monitoring of periop fluid status and will order daily weights.  Nursing to please notify hospitalist for weight gain >3 lbs in 24 hours.    4) Left Bundle Branch Block (LBBB): Improved per Cardiology notes.     5) Supraventricular Tachycardia (SVT): improved on metoprolol.     6) Hyperlipidemia: On atorvastatin.    7) Hypertension: Well-controlled on amlodipine, losartan, metoprolol, and spironolactone.  Patient will hold spironolactone for a few days before surgery due to recent hyperkalemia.  Patient also instructed to hold losartan on the morning of surgery but to continue taking amlodipine and metoprolol.    8) Obstructive Sleep Apnea: Moderate to Severe per recent sleep medicine evaluation at [facility] on 12/13/22. Patient was advised to continue wearing her mandibular advancement device, continue weight loss efforts and try some positional therapy with body pillows.  I recommend close monitoring of perioperative respiratory status.  If frequent O2 desats or apneic episodes, nursing to please notify Hospitalist.    9) PONV: Patient reports some issues with nausea and vomiting with anesthesia in the past.  I recommend patient discusses this with preop nursing and anesthesia on the day of surgery.  Would likely benefit from antiemetics or other measures to prevent or minimize nausea/vomiting.    10) Prediabetes: Last hemoglobin A1c 5.8 on 2/21/2022.  Blood glucose normal at 87 on 12/12/2022.  Will check updated hemoglobin A1c on day of surgery and monitor BG's during hospital stay.  Goal BG <180 to decrease risk for infection and postop wound healing complications.  If BG >180, nursing to please notify Hospitalist.    11) Obsessive Compulsive Disorder: On Abilify.    12) Anxiety and Depression: On sertraline, bupropion, and lorazepam.    13) GERD and Eosinophilic Esophagitis: On omeprazole-sodium bicarbonate.    14) Obesity: BMI 37.8, Wt: 223 lbs.  I recommend continued efforts at safe weight loss following recovery from surgery.    Patient appears medically optimized for upcoming surgery. I would recommend Hospitalist Consult to assist with medical management. Please call me below with any questions on this patient.       Review of Systems Notable for: Hyperkalemia, heart failure with preserved ejection fraction, left bundle branch block, supraventricular tachycardia, hyperlipidemia, hypertension, obstructive sleep apnea-with mandibular advancement device, PONV, prediabetes, obsessive-compulsive disorder, anxiety, depression, GERD, eosinophilic esophagitis, obesity.    Past Medical History:   Past Medical History:   Diagnosis Date     Anxiety      Anxiety      Arthritis      Arthritis      Asthma      Chronic fatigue      Chronic fatigue      Congestive heart failure (H)      Depression      Eosinophilic esophagitis      Esophageal dysmotility      GERD (gastroesophageal reflux disease)      GERD (gastroesophageal reflux disease)      History of anesthesia complications      Hypercalcemia     Treated via parathyroidectomy x1     Hypertension      Hypertension      Irregular heart beat      Macular degeneration      Macular degeneration      OCD (obsessive compulsive disorder)      Perspiration excessive      PONV (postoperative nausea and vomiting)      Rosacea      Sleep apnea      Sleep apnea      Uncomplicated asthma      Past Surgical History:   Procedure Laterality Date     ARTHROSCOPY KNEE Right      BLEPHAROPLASTY Bilateral      BUNIONECTOMY Bilateral      CARPAL TUNNEL RELEASE RT/LT      knee replacement     CV CORONARY ANGIOGRAM N/A 6/8/2021    Procedure: Coronary Angiogram;  Surgeon: Oly Woody MD;  Location: [facility] Cardiac Cath Lab;  Service: Cardiology     CV LEFT HEART CATHETERIZATION WITHOUT LEFT VENTRICULOGRAM Left 6/8/2021    Procedure: Left Heart Catheterization Without Left Ventriculogram;  Surgeon: Oly Woody MD;  Location:  [facility] Cardiac Cath Lab;  Service: Cardiology     DILATION AND CURETTAGE       GYN SURGERY      hysterectomy     HYSTERECTOMY      Right ovary remains     JOINT REPLACEMENT       OTHER SURGICAL HISTORY      Hammertoe     PARATHYROIDECTOMY       PARATHYROIDECTOMY      Partial x1     RELEASE CARPAL TUNNEL Bilateral      ZZC TOTAL KNEE ARTHROPLASTY Left 9/25/2018    Procedure: LEFT TOTAL KNEE ARTHROPLASTY;  Surgeon: Osito Harrell MD;  Location: [facility];  Service: Orthopedics     ZZC TOTAL KNEE ARTHROPLASTY Right 11/27/2018    Procedure: RIGHT TOTAL KNEE ARTHROPLASTY;  Surgeon: Osito Harrell MD;  Location: [facility];  Service: Orthopedics       Current Medications:  Patient's Medications   New Prescriptions    No medications on file   Previous Medications    AMLODIPINE (NORVASC) 5 MG TABLET    Take 5 mg by mouth daily    ARIPIPRAZOLE (ABILIFY) 5 MG TABLET    Take 2 mg by mouth daily    ASPIRIN 81 MG CHEWABLE TABLET    Take 81 mg by mouth daily    ATORVASTATIN (LIPITOR) 40 MG TABLET    Take 40 mg by mouth daily    BUPROPION (WELLBUTRIN SR) 200 MG 12 HR TABLET    Take 400 mg by mouth daily    EMPAGLIFLOZIN (JARDIANCE) 10 MG TABS TABLET    Take 10 mg by mouth daily    FERROUS SULFATE 324 (65 FE) MG TBEC        HYDROCORTISONE 2.5 % CREAM    Apply bid prn to rash on thin skin areas.    LORAZEPAM (ATIVAN) 1 MG TABLET    Take 1 mg by mouth daily as needed With dental work    LOSARTAN (COZAAR) 25 MG TABLET    Take 25 mg by mouth daily    METOPROLOL SUCCINATE (TOPROL-XL) 50 MG 24 HR TABLET    Take 100 mg by mouth daily    MULTIPLE VITAMINS-MINERALS (PRESERVISION/LUTEIN PO)        OMEPRAZOLE-SODIUM BICARBONATE (ZEGERID)  MG PER CAPSULE    Take 1 capsule by mouth every morning (before breakfast)    ORDER FOR DME    Equipment being ordered: Mandibular advancement device for treatment of moderate obstructive sleep apnea (AHI 16, PSG 7/16/2017 at [employer])    POLYETHYLENE GLYCOL  (MIRALAX/GLYCOLAX) POWDER    Take 17 g by mouth    SERTRALINE (ZOLOFT) 100 MG TABLET    Take 100 mg by mouth daily    SPIRONOLACTONE (ALDACTONE) 25 MG TABLET    Take 25 mg by mouth daily    SULFACETAMIDE SODIUM 10 % LIQD    Apply topically daily as needed    TRIAMCINOLONE (KENALOG) 0.1 % CREAM    to affected areas. Do not use on face, axilla or genital area    ZOLPIDEM (AMBIEN) 10 MG TABLET    Take 10 mg by mouth nightly as needed for sleep rare   Modified Medications    No medications on file   Discontinued Medications    ALBUTEROL (PROAIR HFA/PROVENTIL HFA/VENTOLIN HFA) 108 (90 BASE) MCG/ACT INHALER    Inhale 2 puffs into the lungs    AMPHETAMINE-DEXTROAMPHETAMINE (ADDERALL XR) 15 MG PER 24 HR CAPSULE    Take 15 mg by mouth    ARMODAFINIL PO    Take 15 mg by mouth    CHOLECALCIFEROL (VITAMIN D-1000 MAX ST) 1000 UNITS TABS    Take 1,000 Units by mouth    DULOXETINE (CYMBALTA) 30 MG EC CAPSULE    Take 30 mg by mouth    DULOXETINE (CYMBALTA) 60 MG EC CAPSULE    Take 60 mg by mouth    FEXOFENADINE (ALLEGRA) 180 MG TABLET    Take 180 mg by mouth    HYDROXYZINE (VISTARIL) 25 MG CAPSULE    Take 25 mg by mouth    IMIPRAMINE (TOFRANIL) 25 MG TABLET    Take 50 mg by mouth    LOSARTAN-HYDROCHLOROTHIAZIDE (HYZAAR) 100-25 MG PER TABLET    Take 1 tablet by mouth    MONTELUKAST (SINGULAIR) 10 MG TABLET    TAKE 1 TABLET BY MOUTH DAILY AT BEDTIME    POTASSIUM CHLORIDE SA (K-DUR/KLOR-CON M) 20 MEQ CR TABLET    Take 20 mEq by mouth    TERAZOSIN (HYTRIN) 1 MG CAPSULE    Take 1 mg by mouth    TERAZOSIN (HYTRIN) 2 MG CAPSULE    Take 2 mg by mouth       ALLERGIES:  Allergies   Allergen Reactions     Adhesive Tape      Animal Dander      Morphine      Seasonal Allergies        Social History  Social History     Tobacco Use     Smoking status: Never     Smokeless tobacco: Never   Substance Use Topics     Alcohol use: No     Drug use: No       Any Abnormal Recent Diagnostics? Yes  Potassium 5.3 on 12/12/2022: History of intermittent  hyperkalemia.  Patient instructed to hold potassium sparing diuretic, spironolactone, for a few days before surgery and to avoid potassium rich foods.  We will recheck potassium as part of BMP on day of surgery and can monitor postoperatively as needed.  Hemoglobin A1c 5.8 on 2/21/2022: In prediabetes range.  Will check updated hemoglobin A1c on day of surgery and monitor BG's during hospital stay.  Goal BG <180.    Discharge Planning:   Discharge plan according to [facility]:      Home POD 1 or POD 2 when medically stable for discharge with assist of Spouse    11/28/22 3481   Discharge Planning   Patient/Family Anticipates Transition to home   Concerns to be Addressed all concerns addressed in this encounter   Living Arrangements   People in Home spouse   Type of Residence Private Residence   Is your private residence a single family home or apartment? Single family home   Stair Railings, Within Home, Primary railings safe and in good condition   Bathroom Shower/Tub Walk-in shower   Equipment Currently Used at Home none   Support System   Support Systems Spouse/Significant Other   Do you have someone available to stay with you one or two nights once you are home? Yes       WINTER Henao, CNP   Advanced Practice Nurse Navigator- Orthopedics  [facility]   Phone: [phone]

## 2022-12-16 ENCOUNTER — HOSPITAL ENCOUNTER (INPATIENT)
Facility: CLINIC | Age: 72
LOS: 1 days | Discharge: HOME | DRG: 467 | End: 2022-12-17
Attending: ORTHOPAEDIC SURGERY | Admitting: ORTHOPAEDIC SURGERY
Payer: MEDICARE

## 2022-12-16 ENCOUNTER — APPOINTMENT (OUTPATIENT)
Dept: RADIOLOGY | Facility: CLINIC | Age: 72
DRG: 467 | End: 2022-12-16
Attending: ORTHOPAEDIC SURGERY
Payer: MEDICARE

## 2022-12-16 ENCOUNTER — ANESTHESIA EVENT (OUTPATIENT)
Dept: SURGERY | Facility: CLINIC | Age: 72
DRG: 467 | End: 2022-12-16
Payer: MEDICARE

## 2022-12-16 ENCOUNTER — APPOINTMENT (OUTPATIENT)
Dept: PHYSICAL THERAPY | Facility: CLINIC | Age: 72
DRG: 467 | End: 2022-12-16
Attending: ORTHOPAEDIC SURGERY
Payer: MEDICARE

## 2022-12-16 ENCOUNTER — ANESTHESIA (OUTPATIENT)
Dept: SURGERY | Facility: CLINIC | Age: 72
DRG: 467 | End: 2022-12-16
Payer: MEDICARE

## 2022-12-16 DIAGNOSIS — Z96.60 STATUS POST JOINT REPLACEMENT: Primary | ICD-10-CM

## 2022-12-16 PROBLEM — I10 ESSENTIAL HYPERTENSION: Status: ACTIVE | Noted: 2022-12-16

## 2022-12-16 PROBLEM — F41.9 ANXIETY AND DEPRESSION: Status: ACTIVE | Noted: 2019-04-22

## 2022-12-16 PROBLEM — G47.33 OBSTRUCTIVE SLEEP APNEA SYNDROME IN ADULT: Status: ACTIVE | Noted: 2019-04-22

## 2022-12-16 PROBLEM — F32.A ANXIETY AND DEPRESSION: Status: ACTIVE | Noted: 2019-04-22

## 2022-12-16 LAB
ANION GAP SERPL CALCULATED.3IONS-SCNC: 9 MMOL/L (ref 5–18)
BUN SERPL-MCNC: 15 MG/DL (ref 8–28)
CALCIUM SERPL-MCNC: 10 MG/DL (ref 8.5–10.5)
CHLORIDE BLD-SCNC: 105 MMOL/L (ref 98–107)
CO2 SERPL-SCNC: 25 MMOL/L (ref 22–31)
CREAT SERPL-MCNC: 0.82 MG/DL (ref 0.6–1.1)
ERYTHROCYTE [DISTWIDTH] IN BLOOD BY AUTOMATED COUNT: 13.5 % (ref 10–15)
GFR SERPL CREATININE-BSD FRML MDRD: 77 ML/MIN/1.73M2
GLUCOSE BLD-MCNC: 104 MG/DL (ref 70–125)
GRAM STAIN RESULT: NORMAL
GRAM STAIN RESULT: NORMAL
HBA1C MFR BLD: 5.7 %
HCT VFR BLD AUTO: 41 % (ref 35–47)
HGB BLD-MCNC: 13.1 G/DL (ref 11.7–15.7)
MCH RBC QN AUTO: 29.6 PG (ref 26.5–33)
MCHC RBC AUTO-ENTMCNC: 32 G/DL (ref 31.5–36.5)
MCV RBC AUTO: 93 FL (ref 78–100)
PLATELET # BLD AUTO: 374 10E3/UL (ref 150–450)
POTASSIUM BLD-SCNC: 4.5 MMOL/L (ref 3.5–5)
RBC # BLD AUTO: 4.42 10E6/UL (ref 3.8–5.2)
SODIUM SERPL-SCNC: 139 MMOL/L (ref 136–145)
WBC # BLD AUTO: 7.9 10E3/UL (ref 4–11)

## 2022-12-16 PROCEDURE — 370N000017 HC ANESTHESIA TECHNICAL FEE, PER MIN: Performed by: ORTHOPAEDIC SURGERY

## 2022-12-16 PROCEDURE — 250N000013 HC RX MED GY IP 250 OP 250 PS 637: Performed by: FAMILY MEDICINE

## 2022-12-16 PROCEDURE — 250N000011 HC RX IP 250 OP 636: Performed by: ANESTHESIOLOGY

## 2022-12-16 PROCEDURE — 250N000009 HC RX 250: Performed by: NURSE ANESTHETIST, CERTIFIED REGISTERED

## 2022-12-16 PROCEDURE — 0SPC0JZ REMOVAL OF SYNTHETIC SUBSTITUTE FROM RIGHT KNEE JOINT, OPEN APPROACH: ICD-10-PCS | Performed by: ORTHOPAEDIC SURGERY

## 2022-12-16 PROCEDURE — C1776 JOINT DEVICE (IMPLANTABLE): HCPCS | Performed by: ORTHOPAEDIC SURGERY

## 2022-12-16 PROCEDURE — 250N000013 HC RX MED GY IP 250 OP 250 PS 637: Performed by: ANESTHESIOLOGY

## 2022-12-16 PROCEDURE — 80048 BASIC METABOLIC PNL TOTAL CA: CPT | Performed by: NURSE PRACTITIONER

## 2022-12-16 PROCEDURE — 85027 COMPLETE CBC AUTOMATED: CPT | Performed by: ANESTHESIOLOGY

## 2022-12-16 PROCEDURE — 97116 GAIT TRAINING THERAPY: CPT | Mod: GP

## 2022-12-16 PROCEDURE — 250N000011 HC RX IP 250 OP 636: Performed by: ORTHOPAEDIC SURGERY

## 2022-12-16 PROCEDURE — 87075 CULTR BACTERIA EXCEPT BLOOD: CPT | Performed by: ORTHOPAEDIC SURGERY

## 2022-12-16 PROCEDURE — 999N000141 HC STATISTIC PRE-PROCEDURE NURSING ASSESSMENT: Performed by: ORTHOPAEDIC SURGERY

## 2022-12-16 PROCEDURE — 272N000001 HC OR GENERAL SUPPLY STERILE: Performed by: ORTHOPAEDIC SURGERY

## 2022-12-16 PROCEDURE — 250N000013 HC RX MED GY IP 250 OP 250 PS 637: Performed by: PHYSICIAN ASSISTANT

## 2022-12-16 PROCEDURE — 97161 PT EVAL LOW COMPLEX 20 MIN: CPT | Mod: GP

## 2022-12-16 PROCEDURE — 258N000001 HC RX 258: Performed by: ORTHOPAEDIC SURGERY

## 2022-12-16 PROCEDURE — 250N000009 HC RX 250: Performed by: ORTHOPAEDIC SURGERY

## 2022-12-16 PROCEDURE — 250N000011 HC RX IP 250 OP 636: Performed by: PHYSICIAN ASSISTANT

## 2022-12-16 PROCEDURE — 0SRC0J9 REPLACEMENT OF RIGHT KNEE JOINT WITH SYNTHETIC SUBSTITUTE, CEMENTED, OPEN APPROACH: ICD-10-PCS | Performed by: ORTHOPAEDIC SURGERY

## 2022-12-16 PROCEDURE — 250N000011 HC RX IP 250 OP 636: Performed by: NURSE ANESTHETIST, CERTIFIED REGISTERED

## 2022-12-16 PROCEDURE — 87205 SMEAR GRAM STAIN: CPT | Performed by: ORTHOPAEDIC SURGERY

## 2022-12-16 PROCEDURE — 36415 COLL VENOUS BLD VENIPUNCTURE: CPT | Performed by: ANESTHESIOLOGY

## 2022-12-16 PROCEDURE — 87070 CULTURE OTHR SPECIMN AEROBIC: CPT | Performed by: ORTHOPAEDIC SURGERY

## 2022-12-16 PROCEDURE — 83036 HEMOGLOBIN GLYCOSYLATED A1C: CPT | Performed by: NURSE PRACTITIONER

## 2022-12-16 PROCEDURE — 97110 THERAPEUTIC EXERCISES: CPT | Mod: GP

## 2022-12-16 PROCEDURE — 710N000010 HC RECOVERY PHASE 1, LEVEL 2, PER MIN: Performed by: ORTHOPAEDIC SURGERY

## 2022-12-16 PROCEDURE — 250N000013 HC RX MED GY IP 250 OP 250 PS 637: Performed by: ORTHOPAEDIC SURGERY

## 2022-12-16 PROCEDURE — C1713 ANCHOR/SCREW BN/BN,TIS/BN: HCPCS | Performed by: ORTHOPAEDIC SURGERY

## 2022-12-16 PROCEDURE — 360N000078 HC SURGERY LEVEL 5, PER MIN: Performed by: ORTHOPAEDIC SURGERY

## 2022-12-16 PROCEDURE — 120N000001 HC R&B MED SURG/OB

## 2022-12-16 PROCEDURE — 999N000065 XR KNEE PORT RIGHT 1/2 VIEWS: Mod: RT

## 2022-12-16 PROCEDURE — 258N000003 HC RX IP 258 OP 636: Performed by: ANESTHESIOLOGY

## 2022-12-16 PROCEDURE — 250N000009 HC RX 250: Performed by: ANESTHESIOLOGY

## 2022-12-16 PROCEDURE — 250N000009 HC RX 250: Performed by: PHYSICIAN ASSISTANT

## 2022-12-16 PROCEDURE — 99222 1ST HOSP IP/OBS MODERATE 55: CPT | Performed by: FAMILY MEDICINE

## 2022-12-16 DEVICE — ATTUNE KNEE SYSTEM REVISION FEMORAL SLEEVE POROCOAT FULLY COATED 40MM
Type: IMPLANTABLE DEVICE | Site: KNEE | Status: FUNCTIONAL
Brand: ATTUNE

## 2022-12-16 DEVICE — ATTUNE KNEE SYSTEM REVISION TIBIAL SLEEVE POROCOAT FULLY COATED 37MM
Type: IMPLANTABLE DEVICE | Site: KNEE | Status: FUNCTIONAL
Brand: ATTUNE

## 2022-12-16 DEVICE — IMPLANTABLE DEVICE: Type: IMPLANTABLE DEVICE | Site: KNEE | Status: FUNCTIONAL

## 2022-12-16 DEVICE — BONE CEMENT SIMPLEX FULL DOSE 6191-1-001: Type: IMPLANTABLE DEVICE | Site: KNEE | Status: FUNCTIONAL

## 2022-12-16 DEVICE — ATTUNE KNEE SYSTEM REVISION CRS FEMORAL CEMENTED RIGHT SIZE 5
Type: IMPLANTABLE DEVICE | Site: KNEE | Status: FUNCTIONAL
Brand: ATTUNE

## 2022-12-16 DEVICE — ATTUNE KNEE SYSTEM REVISION CRS ROTATING PLATFORM INSERT AOX 6MM SIZE 5
Type: IMPLANTABLE DEVICE | Site: KNEE | Status: NON-FUNCTIONAL
Brand: ATTUNE
Removed: 2023-03-28

## 2022-12-16 RX ORDER — ONDANSETRON 2 MG/ML
INJECTION INTRAMUSCULAR; INTRAVENOUS PRN
Status: DISCONTINUED | OUTPATIENT
Start: 2022-12-16 | End: 2022-12-16

## 2022-12-16 RX ORDER — SPIRONOLACTONE 25 MG/1
25 TABLET ORAL EVERY EVENING
Status: DISCONTINUED | OUTPATIENT
Start: 2022-12-16 | End: 2022-12-17 | Stop reason: HOSPADM

## 2022-12-16 RX ORDER — PROCHLORPERAZINE MALEATE 5 MG
5 TABLET ORAL EVERY 6 HOURS PRN
Status: DISCONTINUED | OUTPATIENT
Start: 2022-12-16 | End: 2022-12-17 | Stop reason: HOSPADM

## 2022-12-16 RX ORDER — FENTANYL CITRATE 50 UG/ML
50 INJECTION, SOLUTION INTRAMUSCULAR; INTRAVENOUS EVERY 5 MIN PRN
Status: DISCONTINUED | OUTPATIENT
Start: 2022-12-16 | End: 2022-12-16 | Stop reason: HOSPADM

## 2022-12-16 RX ORDER — NICOTINE POLACRILEX 4 MG
15-30 LOZENGE BUCCAL
Status: DISCONTINUED | OUTPATIENT
Start: 2022-12-16 | End: 2022-12-17 | Stop reason: HOSPADM

## 2022-12-16 RX ORDER — ASPIRIN 81 MG/1
81 TABLET ORAL 2 TIMES DAILY
Status: DISCONTINUED | OUTPATIENT
Start: 2022-12-16 | End: 2022-12-17 | Stop reason: HOSPADM

## 2022-12-16 RX ORDER — ACETAMINOPHEN 325 MG/1
975 TABLET ORAL EVERY 8 HOURS
Status: DISCONTINUED | OUTPATIENT
Start: 2022-12-16 | End: 2022-12-17 | Stop reason: HOSPADM

## 2022-12-16 RX ORDER — ONDANSETRON 2 MG/ML
4 INJECTION INTRAMUSCULAR; INTRAVENOUS EVERY 6 HOURS PRN
Status: DISCONTINUED | OUTPATIENT
Start: 2022-12-16 | End: 2022-12-17 | Stop reason: HOSPADM

## 2022-12-16 RX ORDER — OXYCODONE HYDROCHLORIDE 5 MG/1
10 TABLET ORAL EVERY 4 HOURS PRN
Status: DISCONTINUED | OUTPATIENT
Start: 2022-12-16 | End: 2022-12-17 | Stop reason: HOSPADM

## 2022-12-16 RX ORDER — METOPROLOL SUCCINATE 100 MG/1
100 TABLET, EXTENDED RELEASE ORAL DAILY
Status: DISCONTINUED | OUTPATIENT
Start: 2022-12-17 | End: 2022-12-17 | Stop reason: HOSPADM

## 2022-12-16 RX ORDER — DEXAMETHASONE SODIUM PHOSPHATE 10 MG/ML
INJECTION, SOLUTION INTRAMUSCULAR; INTRAVENOUS PRN
Status: DISCONTINUED | OUTPATIENT
Start: 2022-12-16 | End: 2022-12-16

## 2022-12-16 RX ORDER — AMOXICILLIN 250 MG
1 CAPSULE ORAL 2 TIMES DAILY
Status: DISCONTINUED | OUTPATIENT
Start: 2022-12-16 | End: 2022-12-17 | Stop reason: HOSPADM

## 2022-12-16 RX ORDER — MEPERIDINE HYDROCHLORIDE 25 MG/ML
12.5 INJECTION INTRAMUSCULAR; INTRAVENOUS; SUBCUTANEOUS EVERY 5 MIN PRN
Status: DISCONTINUED | OUTPATIENT
Start: 2022-12-16 | End: 2022-12-16 | Stop reason: HOSPADM

## 2022-12-16 RX ORDER — KETAMINE HYDROCHLORIDE 10 MG/ML
INJECTION INTRAMUSCULAR; INTRAVENOUS PRN
Status: DISCONTINUED | OUTPATIENT
Start: 2022-12-16 | End: 2022-12-16

## 2022-12-16 RX ORDER — ACETAMINOPHEN 325 MG/1
975 TABLET ORAL ONCE
Status: COMPLETED | OUTPATIENT
Start: 2022-12-16 | End: 2022-12-16

## 2022-12-16 RX ORDER — LIDOCAINE 40 MG/G
CREAM TOPICAL
Status: DISCONTINUED | OUTPATIENT
Start: 2022-12-16 | End: 2022-12-17 | Stop reason: HOSPADM

## 2022-12-16 RX ORDER — DEXTROSE MONOHYDRATE 25 G/50ML
25-50 INJECTION, SOLUTION INTRAVENOUS
Status: DISCONTINUED | OUTPATIENT
Start: 2022-12-16 | End: 2022-12-17 | Stop reason: HOSPADM

## 2022-12-16 RX ORDER — LORAZEPAM 1 MG/1
1 TABLET ORAL EVERY 6 HOURS PRN
Status: DISCONTINUED | OUTPATIENT
Start: 2022-12-16 | End: 2022-12-17 | Stop reason: HOSPADM

## 2022-12-16 RX ORDER — CEFADROXIL 500 MG/1
500 CAPSULE ORAL 2 TIMES DAILY
Qty: 14 CAPSULE | Refills: 0 | Status: SHIPPED | OUTPATIENT
Start: 2022-12-16 | End: 2023-03-24

## 2022-12-16 RX ORDER — ACETAMINOPHEN 500 MG
1000 TABLET ORAL EVERY 8 HOURS PRN
Qty: 60 TABLET | Refills: 1 | Status: ON HOLD | OUTPATIENT
Start: 2022-12-16 | End: 2023-03-28

## 2022-12-16 RX ORDER — HYDROMORPHONE HCL IN WATER/PF 6 MG/30 ML
0.2 PATIENT CONTROLLED ANALGESIA SYRINGE INTRAVENOUS EVERY 5 MIN PRN
Status: DISCONTINUED | OUTPATIENT
Start: 2022-12-16 | End: 2022-12-16 | Stop reason: HOSPADM

## 2022-12-16 RX ORDER — VANCOMYCIN HYDROCHLORIDE 1 G/20ML
1 INJECTION, POWDER, LYOPHILIZED, FOR SOLUTION INTRAVENOUS ONCE
Status: DISCONTINUED | OUTPATIENT
Start: 2022-12-16 | End: 2022-12-16

## 2022-12-16 RX ORDER — BUPIVACAINE HYDROCHLORIDE 5 MG/ML
INJECTION, SOLUTION EPIDURAL; INTRACAUDAL
Status: COMPLETED | OUTPATIENT
Start: 2022-12-16 | End: 2022-12-16

## 2022-12-16 RX ORDER — KETOROLAC TROMETHAMINE 30 MG/ML
15 INJECTION, SOLUTION INTRAMUSCULAR; INTRAVENOUS EVERY 6 HOURS
Status: COMPLETED | OUTPATIENT
Start: 2022-12-16 | End: 2022-12-17

## 2022-12-16 RX ORDER — ALBUTEROL SULFATE 0.83 MG/ML
2.5 SOLUTION RESPIRATORY (INHALATION) EVERY 4 HOURS PRN
Status: DISCONTINUED | OUTPATIENT
Start: 2022-12-16 | End: 2022-12-16 | Stop reason: HOSPADM

## 2022-12-16 RX ORDER — TRANEXAMIC ACID 650 MG/1
1950 TABLET ORAL ONCE
Status: COMPLETED | OUTPATIENT
Start: 2022-12-16 | End: 2022-12-16

## 2022-12-16 RX ORDER — HYDROMORPHONE HCL IN WATER/PF 6 MG/30 ML
0.4 PATIENT CONTROLLED ANALGESIA SYRINGE INTRAVENOUS
Status: DISCONTINUED | OUTPATIENT
Start: 2022-12-16 | End: 2022-12-17 | Stop reason: HOSPADM

## 2022-12-16 RX ORDER — CEFAZOLIN SODIUM 2 G/100ML
2 INJECTION, SOLUTION INTRAVENOUS EVERY 8 HOURS
Status: COMPLETED | OUTPATIENT
Start: 2022-12-16 | End: 2022-12-17

## 2022-12-16 RX ORDER — ONDANSETRON 4 MG/1
4 TABLET, ORALLY DISINTEGRATING ORAL EVERY 6 HOURS PRN
Status: DISCONTINUED | OUTPATIENT
Start: 2022-12-16 | End: 2022-12-17 | Stop reason: HOSPADM

## 2022-12-16 RX ORDER — ONDANSETRON 4 MG/1
4 TABLET, ORALLY DISINTEGRATING ORAL EVERY 30 MIN PRN
Status: DISCONTINUED | OUTPATIENT
Start: 2022-12-16 | End: 2022-12-16 | Stop reason: HOSPADM

## 2022-12-16 RX ORDER — OXYCODONE HYDROCHLORIDE 5 MG/1
5 TABLET ORAL EVERY 4 HOURS PRN
Status: DISCONTINUED | OUTPATIENT
Start: 2022-12-16 | End: 2022-12-17 | Stop reason: HOSPADM

## 2022-12-16 RX ORDER — SODIUM CHLORIDE, SODIUM LACTATE, POTASSIUM CHLORIDE, CALCIUM CHLORIDE 600; 310; 30; 20 MG/100ML; MG/100ML; MG/100ML; MG/100ML
INJECTION, SOLUTION INTRAVENOUS CONTINUOUS
Status: DISCONTINUED | OUTPATIENT
Start: 2022-12-16 | End: 2022-12-16 | Stop reason: HOSPADM

## 2022-12-16 RX ORDER — FENTANYL CITRATE 50 UG/ML
25 INJECTION, SOLUTION INTRAMUSCULAR; INTRAVENOUS EVERY 5 MIN PRN
Status: DISCONTINUED | OUTPATIENT
Start: 2022-12-16 | End: 2022-12-16 | Stop reason: HOSPADM

## 2022-12-16 RX ORDER — CEFAZOLIN SODIUM/WATER 2 G/20 ML
2 SYRINGE (ML) INTRAVENOUS
Status: COMPLETED | OUTPATIENT
Start: 2022-12-16 | End: 2022-12-16

## 2022-12-16 RX ORDER — POLYETHYLENE GLYCOL 3350 17 G/17G
17 POWDER, FOR SOLUTION ORAL DAILY
Status: DISCONTINUED | OUTPATIENT
Start: 2022-12-17 | End: 2022-12-17 | Stop reason: HOSPADM

## 2022-12-16 RX ORDER — FENTANYL CITRATE 50 UG/ML
100 INJECTION, SOLUTION INTRAMUSCULAR; INTRAVENOUS
Status: COMPLETED | OUTPATIENT
Start: 2022-12-16 | End: 2022-12-16

## 2022-12-16 RX ORDER — CALCIUM CARBONATE/VITAMIN D3 600 MG-10
1 TABLET ORAL DAILY
Status: ON HOLD | COMMUNITY
End: 2023-03-28

## 2022-12-16 RX ORDER — HYDROMORPHONE HCL IN WATER/PF 6 MG/30 ML
0.4 PATIENT CONTROLLED ANALGESIA SYRINGE INTRAVENOUS EVERY 5 MIN PRN
Status: DISCONTINUED | OUTPATIENT
Start: 2022-12-16 | End: 2022-12-16 | Stop reason: HOSPADM

## 2022-12-16 RX ORDER — HYDROMORPHONE HCL IN WATER/PF 6 MG/30 ML
0.2 PATIENT CONTROLLED ANALGESIA SYRINGE INTRAVENOUS
Status: DISCONTINUED | OUTPATIENT
Start: 2022-12-16 | End: 2022-12-17 | Stop reason: HOSPADM

## 2022-12-16 RX ORDER — AMOXICILLIN 250 MG
1 CAPSULE ORAL 2 TIMES DAILY
Qty: 30 TABLET | Refills: 0 | Status: SHIPPED | OUTPATIENT
Start: 2022-12-16 | End: 2023-03-24

## 2022-12-16 RX ORDER — LIDOCAINE 40 MG/G
CREAM TOPICAL
Status: DISCONTINUED | OUTPATIENT
Start: 2022-12-16 | End: 2022-12-16 | Stop reason: HOSPADM

## 2022-12-16 RX ORDER — HYDROXYZINE HYDROCHLORIDE 10 MG/1
10 TABLET, FILM COATED ORAL EVERY 6 HOURS PRN
Qty: 30 TABLET | Refills: 1 | Status: SHIPPED | OUTPATIENT
Start: 2022-12-16 | End: 2023-03-24

## 2022-12-16 RX ORDER — HALOPERIDOL 5 MG/ML
1 INJECTION INTRAMUSCULAR
Status: DISCONTINUED | OUTPATIENT
Start: 2022-12-16 | End: 2022-12-16 | Stop reason: HOSPADM

## 2022-12-16 RX ORDER — SERTRALINE HYDROCHLORIDE 100 MG/1
200 TABLET, FILM COATED ORAL DAILY
Status: DISCONTINUED | OUTPATIENT
Start: 2022-12-17 | End: 2022-12-17 | Stop reason: HOSPADM

## 2022-12-16 RX ORDER — OXYCODONE HYDROCHLORIDE 5 MG/1
5 TABLET ORAL EVERY 4 HOURS PRN
Qty: 25 TABLET | Refills: 0 | Status: SHIPPED | OUTPATIENT
Start: 2022-12-16 | End: 2023-03-24

## 2022-12-16 RX ORDER — ARIPIPRAZOLE 2 MG/1
2 TABLET ORAL EVERY EVENING
Status: DISCONTINUED | OUTPATIENT
Start: 2022-12-16 | End: 2022-12-17 | Stop reason: HOSPADM

## 2022-12-16 RX ORDER — METHOCARBAMOL 500 MG/1
500 TABLET, FILM COATED ORAL EVERY 6 HOURS PRN
Status: DISCONTINUED | OUTPATIENT
Start: 2022-12-16 | End: 2022-12-17 | Stop reason: HOSPADM

## 2022-12-16 RX ORDER — ONDANSETRON 2 MG/ML
4 INJECTION INTRAMUSCULAR; INTRAVENOUS EVERY 30 MIN PRN
Status: DISCONTINUED | OUTPATIENT
Start: 2022-12-16 | End: 2022-12-16 | Stop reason: HOSPADM

## 2022-12-16 RX ORDER — LABETALOL HYDROCHLORIDE 5 MG/ML
10 INJECTION, SOLUTION INTRAVENOUS
Status: DISCONTINUED | OUTPATIENT
Start: 2022-12-16 | End: 2022-12-16 | Stop reason: HOSPADM

## 2022-12-16 RX ORDER — HYDROXYZINE HYDROCHLORIDE 10 MG/1
10 TABLET, FILM COATED ORAL EVERY 6 HOURS PRN
Status: DISCONTINUED | OUTPATIENT
Start: 2022-12-16 | End: 2022-12-17 | Stop reason: HOSPADM

## 2022-12-16 RX ORDER — PROPOFOL 10 MG/ML
INJECTION, EMULSION INTRAVENOUS CONTINUOUS PRN
Status: DISCONTINUED | OUTPATIENT
Start: 2022-12-16 | End: 2022-12-16

## 2022-12-16 RX ORDER — ACETAMINOPHEN 325 MG/1
650 TABLET ORAL EVERY 4 HOURS PRN
Status: DISCONTINUED | OUTPATIENT
Start: 2022-12-19 | End: 2022-12-17 | Stop reason: HOSPADM

## 2022-12-16 RX ORDER — BUPROPION HYDROCHLORIDE 100 MG/1
200 TABLET, EXTENDED RELEASE ORAL DAILY
Status: DISCONTINUED | OUTPATIENT
Start: 2022-12-17 | End: 2022-12-17 | Stop reason: HOSPADM

## 2022-12-16 RX ORDER — CEFAZOLIN SODIUM/WATER 2 G/20 ML
2 SYRINGE (ML) INTRAVENOUS SEE ADMIN INSTRUCTIONS
Status: DISCONTINUED | OUTPATIENT
Start: 2022-12-16 | End: 2022-12-16 | Stop reason: HOSPADM

## 2022-12-16 RX ORDER — AMLODIPINE BESYLATE 5 MG/1
5 TABLET ORAL EVERY EVENING
Status: DISCONTINUED | OUTPATIENT
Start: 2022-12-16 | End: 2022-12-17 | Stop reason: HOSPADM

## 2022-12-16 RX ORDER — LOSARTAN POTASSIUM 25 MG/1
25 TABLET ORAL EVERY EVENING
Status: DISCONTINUED | OUTPATIENT
Start: 2022-12-16 | End: 2022-12-17 | Stop reason: HOSPADM

## 2022-12-16 RX ORDER — BISACODYL 10 MG
10 SUPPOSITORY, RECTAL RECTAL DAILY PRN
Status: DISCONTINUED | OUTPATIENT
Start: 2022-12-16 | End: 2022-12-17 | Stop reason: HOSPADM

## 2022-12-16 RX ORDER — ATORVASTATIN CALCIUM 40 MG/1
40 TABLET, FILM COATED ORAL EVERY EVENING
Status: DISCONTINUED | OUTPATIENT
Start: 2022-12-16 | End: 2022-12-17 | Stop reason: HOSPADM

## 2022-12-16 RX ORDER — ZOLPIDEM TARTRATE 5 MG/1
10 TABLET ORAL
Status: DISCONTINUED | OUTPATIENT
Start: 2022-12-16 | End: 2022-12-17 | Stop reason: HOSPADM

## 2022-12-16 RX ORDER — PANTOPRAZOLE SODIUM 20 MG/1
40 TABLET, DELAYED RELEASE ORAL
Status: DISCONTINUED | OUTPATIENT
Start: 2022-12-17 | End: 2022-12-17 | Stop reason: HOSPADM

## 2022-12-16 RX ORDER — SODIUM CHLORIDE, SODIUM LACTATE, POTASSIUM CHLORIDE, CALCIUM CHLORIDE 600; 310; 30; 20 MG/100ML; MG/100ML; MG/100ML; MG/100ML
INJECTION, SOLUTION INTRAVENOUS CONTINUOUS
Status: DISCONTINUED | OUTPATIENT
Start: 2022-12-16 | End: 2022-12-17 | Stop reason: HOSPADM

## 2022-12-16 RX ORDER — POLYETHYLENE GLYCOL 3350 17 G/17G
17 POWDER, FOR SOLUTION ORAL
Status: DISCONTINUED | OUTPATIENT
Start: 2022-12-16 | End: 2022-12-17 | Stop reason: HOSPADM

## 2022-12-16 RX ORDER — LIDOCAINE HYDROCHLORIDE 10 MG/ML
INJECTION, SOLUTION INFILTRATION; PERINEURAL PRN
Status: DISCONTINUED | OUTPATIENT
Start: 2022-12-16 | End: 2022-12-16

## 2022-12-16 RX ORDER — VANCOMYCIN HYDROCHLORIDE 1 G/20ML
1 INJECTION, POWDER, LYOPHILIZED, FOR SOLUTION INTRAVENOUS ONCE
Status: COMPLETED | OUTPATIENT
Start: 2022-12-16 | End: 2022-12-16

## 2022-12-16 RX ADMIN — OXYCODONE HYDROCHLORIDE 5 MG: 5 TABLET ORAL at 17:46

## 2022-12-16 RX ADMIN — ARIPIPRAZOLE 2 MG: 2 TABLET ORAL at 20:28

## 2022-12-16 RX ADMIN — ATORVASTATIN CALCIUM 40 MG: 40 TABLET, FILM COATED ORAL at 20:32

## 2022-12-16 RX ADMIN — KETAMINE HYDROCHLORIDE 5 MG: 10 INJECTION, SOLUTION INTRAMUSCULAR; INTRAVENOUS at 11:02

## 2022-12-16 RX ADMIN — ONDANSETRON 4 MG: 2 INJECTION INTRAMUSCULAR; INTRAVENOUS at 10:08

## 2022-12-16 RX ADMIN — LIDOCAINE HYDROCHLORIDE 20 MG: 10 INJECTION, SOLUTION INFILTRATION; PERINEURAL at 10:05

## 2022-12-16 RX ADMIN — BUPIVACAINE HYDROCHLORIDE 2.7 ML: 5 INJECTION, SOLUTION EPIDURAL; INTRACAUDAL at 10:05

## 2022-12-16 RX ADMIN — KETOROLAC TROMETHAMINE 15 MG: 30 INJECTION, SOLUTION INTRAMUSCULAR; INTRAVENOUS at 20:34

## 2022-12-16 RX ADMIN — KETAMINE HYDROCHLORIDE 5 MG: 10 INJECTION, SOLUTION INTRAMUSCULAR; INTRAVENOUS at 10:37

## 2022-12-16 RX ADMIN — MIDAZOLAM 0.5 MG: 1 INJECTION INTRAMUSCULAR; INTRAVENOUS at 10:05

## 2022-12-16 RX ADMIN — OXYCODONE HYDROCHLORIDE 5 MG: 5 TABLET ORAL at 23:53

## 2022-12-16 RX ADMIN — FENTANYL CITRATE 100 MCG: 50 INJECTION INTRAMUSCULAR; INTRAVENOUS at 09:34

## 2022-12-16 RX ADMIN — Medication 2 G: at 10:00

## 2022-12-16 RX ADMIN — BUPIVACAINE HYDROCHLORIDE 15 ML: 5 INJECTION, SOLUTION EPIDURAL; INTRACAUDAL; PERINEURAL at 09:40

## 2022-12-16 RX ADMIN — KETOROLAC TROMETHAMINE 15 MG: 30 INJECTION, SOLUTION INTRAMUSCULAR; INTRAVENOUS at 15:42

## 2022-12-16 RX ADMIN — DEXAMETHASONE SODIUM PHOSPHATE 4 MG: 10 INJECTION, SOLUTION INTRAMUSCULAR; INTRAVENOUS at 10:08

## 2022-12-16 RX ADMIN — CEFAZOLIN SODIUM 2 G: 2 INJECTION, SOLUTION INTRAVENOUS at 17:46

## 2022-12-16 RX ADMIN — PROPOFOL 100 MCG/KG/MIN: 10 INJECTION, EMULSION INTRAVENOUS at 10:03

## 2022-12-16 RX ADMIN — MIDAZOLAM HYDROCHLORIDE 2 MG: 1 INJECTION, SOLUTION INTRAMUSCULAR; INTRAVENOUS at 09:33

## 2022-12-16 RX ADMIN — TRANEXAMIC ACID 1950 MG: 650 TABLET ORAL at 08:00

## 2022-12-16 RX ADMIN — SODIUM CHLORIDE, POTASSIUM CHLORIDE, SODIUM LACTATE AND CALCIUM CHLORIDE: 600; 310; 30; 20 INJECTION, SOLUTION INTRAVENOUS at 09:28

## 2022-12-16 RX ADMIN — KETAMINE HYDROCHLORIDE 5 MG: 10 INJECTION, SOLUTION INTRAMUSCULAR; INTRAVENOUS at 10:46

## 2022-12-16 RX ADMIN — SENNOSIDES AND DOCUSATE SODIUM 1 TABLET: 50; 8.6 TABLET ORAL at 20:28

## 2022-12-16 RX ADMIN — ASPIRIN 81 MG: 81 TABLET, COATED ORAL at 20:26

## 2022-12-16 RX ADMIN — ACETAMINOPHEN 975 MG: 325 TABLET ORAL at 08:00

## 2022-12-16 RX ADMIN — MIDAZOLAM 0.5 MG: 1 INJECTION INTRAMUSCULAR; INTRAVENOUS at 12:14

## 2022-12-16 RX ADMIN — ACETAMINOPHEN 975 MG: 325 TABLET ORAL at 22:06

## 2022-12-16 RX ADMIN — ACETAMINOPHEN 975 MG: 325 TABLET ORAL at 15:42

## 2022-12-16 ASSESSMENT — ACTIVITIES OF DAILY LIVING (ADL)
FALL_HISTORY_WITHIN_LAST_SIX_MONTHS: NO
WALKING_OR_CLIMBING_STAIRS_DIFFICULTY: NO
DRESSING/BATHING_DIFFICULTY: NO
WEAR_GLASSES_OR_BLIND: YES
ADLS_ACUITY_SCORE: 29
ADLS_ACUITY_SCORE: 29
TOILETING_ISSUES: NO
ADLS_ACUITY_SCORE: 22
CHANGE_IN_FUNCTIONAL_STATUS_SINCE_ONSET_OF_CURRENT_ILLNESS/INJURY: NO
ADLS_ACUITY_SCORE: 31
ADLS_ACUITY_SCORE: 22
CONCENTRATING,_REMEMBERING_OR_MAKING_DECISIONS_DIFFICULTY: NO
DOING_ERRANDS_INDEPENDENTLY_DIFFICULTY: NO
DIFFICULTY_EATING/SWALLOWING: NO
VISION_MANAGEMENT: GLASSES
ADLS_ACUITY_SCORE: 31
ADLS_ACUITY_SCORE: 22
ADLS_ACUITY_SCORE: 29

## 2022-12-16 ASSESSMENT — ENCOUNTER SYMPTOMS
DYSRHYTHMIAS: 1
SEIZURES: 0

## 2022-12-16 NOTE — CONSULTS
[facility] MEDICINE CONSULT NOTE   Physician requesting consult: James Roberson MD    Reason for consult: Postoperative medical management of medical co-morbidities as below    Identification/Summary:   Katie Kramer is a 72 year old female with a PMH of HTN, HFpEF, hyperlipidemia, KERLINE, palpitations, anxiety, depression, OCD, GERD with esophagitis and chronic fatigue..  Underwent revision right total knee arthroplasty.     Assessment and Plan:  -Status post revision right total knee arthroplasty  Had loosening of her previous right knee components from 4 years ago.  Pain management, physical therapy and discharge disposition per orthopedics.  -Essential hypertension  -Heart failure with preserved ejection fraction  11/7/2022 echocardiogram done at [facility] showed preserved EF of 65%.  No wall motion abnormalities.  No change from 7/18/2022.  Order home Norvasc, metoprolol, spironolactone and losartan with hold parameters.  Patient is on Jardiance under treatment study by [facility] for heart failure management.  A1c 5.7.  Continue home Jardiance.  No need to check sugars.  -Anxiety/depression  -OCD  Order home Abilify, Wellbutrin, lorazepam, Ambien and Zoloft.  -Rosacea  Order home metronidazole cream.  -GERD  -Eosinophilic esophagitis  Order home Xagrid.  -Hyperlipidemia  Order home Lipitor.    COVID-19 PCR no testing necessary per policy  Noted.  Anticoagulation.    Aspirin 81 mg twice daily ordered by surgery.  Routine postoperative risk.  Fluids: Per orthopedics  Pain meds: Per orthopedics  Therapy: Per orthopedics  Serna:Not present  Current Diet  Orders Placed This Encounter      Advance Diet as Tolerated: Regular Diet Adult      Discharge Instruction - Regular Diet Adult    Supplements  None      -Disposition   per orthopedic    Code status:Full Code   [facility] service was asked to evaluate patient for postoperative medical management as follows below. Please resume the home  "medications as reconciled and further noted with ordered hold parameters.  Thank you for this consult; we will continue to follow this patient until discharge.    Procedure(s):  REVISION OF RIGHT TOTAL KNEE ARTHROPLASTY  Day of Surgery  Estimated Blood Loss:  50 mL  Hospital Problem List   No problem-specific Assessment & Plan notes found for this encounter.    Principal Problem:    Status post joint replacement  Active Problems:    Essential hypertension    Esophageal reflux    Obstructive sleep apnea syndrome in adult    Anxiety and depression      -Reviewed the patient's preoperative H and P and updated missing elements.  -Home medication reconciliation has been reviewed.  Medications have been ordered as noted from the home list and changes are documented above     Clinically Significant Risk Factors Present on Admission                  # Hypertension: home medication list includes antihypertensive(s)      # Obesity: Estimated body mass index is 38.88 kg/m  as calculated from the following:    Height as of this encounter: 1.626 m (5' 4\").    Weight as of this encounter: 102.7 kg (226 lb 8 oz).           HISTORY     Katie Kramer is a 72 year old female with PMH of HTN, HFpEF, hyperlipidemia, KERLINE, palpitations, anxiety, depression, OCD, GERD with esophagitis and chronic fatigue..  Underwent revision right total knee arthroplasty.  Had loosening of her previous components from just 4 years ago so underwent revision.  Otherwise doing well.  No chest pain.  No shortness of breath.  No nausea or vomiting.  Patient does deal with chronic intermittent cough.  No change from baseline.  No fever sweats or chills.  Doing well after surgery.  Patient utilizes a mouthguard and underwent uvuloplasty for treatment of her sleep apnea.  Patient does not have prediabetes her A1c was 5.7.  She takes Jardiance as trial treatment through [facility] for heart failure.  Has noticed improvement in her exercise and activity " tolerance since then.  Denies personal history of heart attack, stroke, cancer, diabetes, DVT, PE, peptic ulcer disease.   present and supportive..  .  Questions answered to verbalized satisfaction.    Past Medical History     Past Medical History:  No date: Anxiety  No date: Arthritis  No date: Chronic fatigue  No date: Congestive heart failure (H)  No date: Depression  No date: Eosinophilic esophagitis  No date: Esophageal dysmotility  No date: GERD (gastroesophageal reflux disease)  No date: History of anesthesia complications  No date: Hypercalcemia      Comment:  Treated via parathyroidectomy x1  No date: Hypertension  No date: Irregular heart beat  No date: Macular degeneration  No date: OCD (obsessive compulsive disorder)  No date: Perspiration excessive  No date: PONV (postoperative nausea and vomiting)  No date: Rosacea  No date: Sleep apnea      Comment:  uses mouthguard  No date: Uncomplicated asthma     Patient Active Problem List    Diagnosis Date Noted     Status post joint replacement 12/16/2022     Priority: Medium     Essential hypertension 12/16/2022     Priority: Medium     Obstructive sleep apnea syndrome in adult 04/22/2019     Priority: Medium     Formatting of this note might be different from the original.  Added automatically from request for surgery [id number]       Anxiety and depression 04/22/2019     Priority: Medium     Esophageal reflux 02/06/2007     Priority: Medium     Surgical History     Past Surgical History:   Procedure Laterality Date     ARTHROSCOPY KNEE Right      BLEPHAROPLASTY Bilateral      BUNIONECTOMY Bilateral      CARPAL TUNNEL RELEASE RT/LT      knee replacement     CV CORONARY ANGIOGRAM N/A 06/08/2021    Procedure: Coronary Angiogram;  Surgeon: Oly Woody MD;  Location: [facility] Cardiac Cath Lab;  Service: Cardiology     CV LEFT HEART CATHETERIZATION WITHOUT LEFT VENTRICULOGRAM Left 06/08/2021    Procedure: Left Heart Catheterization Without Left  Ventriculogram;  Surgeon: Oly Woody MD;  Location: [facility] Cardiac Cath Lab;  Service: Cardiology     DILATION AND CURETTAGE       GYN SURGERY      hysterectomy     HYSTERECTOMY      Right ovary remains     JOINT REPLACEMENT       OTHER SURGICAL HISTORY      [place]     PARATHYROIDECTOMY       PARATHYROIDECTOMY      Partial x1     RELEASE CARPAL TUNNEL Bilateral      UVULOPALATOPHARYNGOPLASTY       [facility] TOTAL KNEE ARTHROPLASTY Left 09/25/2018    Procedure: LEFT TOTAL KNEE ARTHROPLASTY;  Surgeon: Osito Harrell MD;  Location: [facility];  Service: Orthopedics     [facility] TOTAL KNEE ARTHROPLASTY Right 11/27/2018    Procedure: RIGHT TOTAL KNEE ARTHROPLASTY;  Surgeon: Osito Harrell MD;  Location: [facility];  Service: Orthopedics     Family History      Family History   Problem Relation Age of Onset     Depression Mother      Post Operative Nausea and Vomiting Mother      Colon Cancer Father      Lung Cancer Father         Mesothelioma     Hypertension Father       Social History      Social History     Tobacco Use     Smoking status: Never     Smokeless tobacco: Never   Substance Use Topics     Alcohol use: No     Drug use: No      Allergies     Allergies   Allergen Reactions     Adhesive Tape      Animal Dander      Morphine Nausea     Seasonal Allergies        Prior to Admission Medications      Prior to Admission Medications   Prescriptions Last Dose Informant Patient Reported? Taking?   ARIPiprazole (ABILIFY) 2 MG tablet 12/15/2022  Yes Yes   Sig: Take 2 mg by mouth every evening   LORazepam (ATIVAN) 1 MG tablet   Yes Yes   Sig: Take 1 mg by mouth every 6 hours as needed With dental work   Multiple Vitamins-Minerals (PRESERVISION/LUTEIN PO) 12/13/2022  Yes Yes   Sig: Take 1 tablet by mouth 2 times daily   amLODIPine (NORVASC) 5 MG tablet 12/15/2022  Yes Yes   Sig: Take 5 mg by mouth every evening   aspirin 81 MG chewable tablet 12/7/2022  Yes Yes   Sig: Take 81 mg by mouth daily    atorvastatin (LIPITOR) 40 MG tablet 12/15/2022  Yes Yes   Sig: Take 40 mg by mouth every evening   buPROPion (WELLBUTRIN SR) 200 MG 12 hr tablet 2022  Yes Yes   Sig: Take 200 mg by mouth daily   calcium carbonate-vitamin D (CALTRATE) 600-10 MG-MCG per tablet 2022  Yes Yes   Sig: Take 1 tablet by mouth daily   empagliflozin (JARDIANCE) 10 MG TABS tablet 2022  Yes Yes   Sig: Take 10 mg by mouth daily   ferrous sulfate (FE TABS) 325 (65 Fe) MG EC tablet 12/15/2022  Yes Yes   Sig: Take 325 mg by mouth every evening   hydrocortisone 2.5 % ointment   Yes Yes   Sig: Apply topically 2 times daily as needed   losartan (COZAAR) 25 MG tablet 12/15/2022  Yes Yes   Sig: Take 25 mg by mouth every evening   metoprolol succinate ER (TOPROL XL) 100 MG 24 hr tablet 2022 at AM  Yes Yes   Sig: Take 100 mg by mouth daily   metroNIDAZOLE (METROCREAM) 0.75 % external cream 12/15/2022 at Pt brought  Yes Yes   Sig: Apply topically 2 times daily   omeprazole-sodium bicarbonate (ZEGERID)  MG per capsule 2022  Yes Yes   Sig: Take 1 capsule by mouth every morning (before breakfast)   order for DME   No No   Sig: Equipment being ordered: Mandibular advancement device for treatment of moderate obstructive sleep apnea (AHI 16, PSG 2017 at [facility])   polyethylene glycol (MIRALAX/GLYCOLAX) powder   Yes Yes   Sig: Take 17 g by mouth nightly as needed   sertraline (ZOLOFT) 100 MG tablet 2022  Yes Yes   Sig: Take 200 mg by mouth daily   spironolactone (ALDACTONE) 25 MG tablet 2022  Yes Yes   Sig: Take 25 mg by mouth every evening   triamcinolone (KENALOG) 0.1 % cream   Yes Yes   Si times daily as needed Do not use on face, axilla or genital area   zolpidem (AMBIEN) 10 MG tablet   Yes Yes   Sig: Take 10 mg by mouth nightly as needed for sleep rare      Facility-Administered Medications: None      Review of Systems     A 12 point comprehensive review of systems was negative except as  noted above in HPI.    OBJECTIVE         Physical Exam   Temp:  [97.5  F (36.4  C)-98.9  F (37.2  C)] 97.9  F (36.6  C)  Pulse:  [] 101  Resp:  [10-19] 14  BP: (113-166)/(63-95) 130/83  SpO2:  [91 %-100 %] 95 %  Body mass index is 38.88 kg/m .  Constitutional: awake, alert, cooperative, no apparent distress, and appears stated age and moderately obese  Eyes: Lids and lashes normal, pupils equal, round and reactive to light, extra ocular muscles intact, sclera clear, conjunctiva normal  ENT: Normocephalic, without obvious abnormality, atraumatic, sinuses nontender on palpation, external ears without lesions, oral pharynx with moist mucous membranes, tonsils without erythema or exudates, gums normal and good dentition.  Hematologic / Lymphatic: no cervical lymphadenopathy and no supraclavicular lymphadenopathy  Respiratory: No increased work of breathing, good air exchange, clear to auscultation bilaterally, no crackles or wheezing  Cardiovascular: Normal apical impulse, regular rate and rhythm, normal S1 and S2, no S3 or S4, and no murmur noted  GI: No scars, normal bowel sounds, soft, non-distended, non-tender, no masses palpated, no hepatosplenomegally  Skin: normal skin color, texture, turgor, no redness, warmth, or swelling, and no rashes  Musculoskeletal: There is no redness, warmth, or swelling of the joints.  Full range of motion noted.  Motor strength is 5 out of 5 all extremities bilaterally.  Tone is normal.  Right lower extremity surgical dressing and Ace wrap left intact no lower extremity pitting edema present  Neurologic: Cranial nerves II-XII are grossly intact. Sensory:  Sensory intact  Neuropsychiatric: General: normal, calm and normal eye contact Level of consciousness: alert / normal Affect: normal Orientation: oriented to self, place, time and situation Memory and insight: normal, memory for past and recent events intact and thought process normal        Imaging Reviewed Personally By Myself  "     Radiology Results:   Recent Results (from the past 24 hour(s))   POC US Guidance Needle Placement    Narrative    Ultrasound was performed as guidance to an anesthesia procedure.  Click   \"PACS images\" hyperlink below to view any stored images.  For specific   procedure details, view procedure note authored by anesthesia.   POC US Guidance Needle Placement    Narrative    Ultrasound was performed as guidance to an anesthesia procedure.  Click   \"PACS images\" hyperlink below to view any stored images.  For specific   procedure details, view procedure note authored by anesthesia.   XR Knee Port Right 1/2 Views    Narrative    EXAM: XR KNEE PORT RIGHT 1/2 VIEWS  LOCATION: [facility]  DATE/TIME: 12/16/2022 1:31 PM    INDICATION: Postop Total Knee.  COMPARISON: None.      Impression    IMPRESSION: Postoperative changes of right total knee arthroplasty and patellar resurfacing. Postprocedural air within the joint and surrounding soft tissues. Components appear well seated.       Labs Reviewed Personally By Myself     Results for orders placed or performed during the hospital encounter of 12/16/22 (from the past 24 hour(s))   POC US Guidance Needle Placement    Narrative    Ultrasound was performed as guidance to an anesthesia procedure.  Click   \"PACS images\" hyperlink below to view any stored images.  For specific   procedure details, view procedure note authored by anesthesia.   POC US Guidance Needle Placement    Narrative    Ultrasound was performed as guidance to an anesthesia procedure.  Click   \"PACS images\" hyperlink below to view any stored images.  For specific   procedure details, view procedure note authored by anesthesia.   Basic metabolic panel   Result Value Ref Range    Sodium 139 136 - 145 mmol/L    Potassium 4.5 3.5 - 5.0 mmol/L    Chloride 105 98 - 107 mmol/L    Carbon Dioxide (CO2) 25 22 - 31 mmol/L    Anion Gap 9 5 - 18 mmol/L    Urea Nitrogen 15 8 - 28 mg/dL    " Creatinine 0.82 0.60 - 1.10 mg/dL    Calcium 10.0 8.5 - 10.5 mg/dL    Glucose 104 70 - 125 mg/dL    GFR Estimate 77 >60 mL/min/1.73m2   Hemoglobin A1c   Result Value Ref Range    Hemoglobin A1C 5.7 (H) <5.7 %   CBC with platelets   Result Value Ref Range    WBC Count 7.9 4.0 - 11.0 10e3/uL    RBC Count 4.42 3.80 - 5.20 10e6/uL    Hemoglobin 13.1 11.7 - 15.7 g/dL    Hematocrit 41.0 35.0 - 47.0 %    MCV 93 78 - 100 fL    MCH 29.6 26.5 - 33.0 pg    MCHC 32.0 31.5 - 36.5 g/dL    RDW 13.5 10.0 - 15.0 %    Platelet Count 374 150 - 450 10e3/uL   XR Knee Port Right 1/2 Views    Narrative    EXAM: XR KNEE PORT RIGHT 1/2 VIEWS  LOCATION: [facility]  DATE/TIME: 12/16/2022 1:31 PM    INDICATION: Postop Total Knee.  COMPARISON: None.      Impression    IMPRESSION: Postoperative changes of right total knee arthroplasty and patellar resurfacing. Postprocedural air within the joint and surrounding soft tissues. Components appear well seated.       Preoperative Labs Reviewed Personally By Myself     COVID-19 PCR testing not indicated        Echo Research Transthoracic with Report    Anatomical Region Laterality Modality   -- -- Echocardiography     Impression    This study was performed per research protocol IRB # [id number].   LEFT VENTRICLE:Normal left ventricular chamber size. Normal left ventricular geometry. Calculated 2-D biplane volumetric left ventricular ejection fraction 65%. No regional wall motion abnormalities. Normal left ventricular filling pressure , at rest.   RIGHT VENTRICLE:Normal right ventricular chamber size. Normal right ventricular systolic function. Estimated right ventricular systolic pressure 25 mmHg (right atrial pressure of 5 mmHg).   ATRIA:Normal left atrial size. Left atrial volume index 28 ml/m2. Normal right atrial size by visual estimate.   CARDIAC VALVES:Trileaflet aortic valve. Mildly thickened aortic valve. No aortic valve regurgitation. Mildly thickened mitral valve.  Trivial mitral valve regurgitation. Normal pulmonary valve. Normal pulmonary valve systolic velocities. Trivial pulmonary   valve regurgitation. Normal tricuspid valve. Trivial tricuspid valve regurgitation.   OTHER ECHO FINDINGS:Normal inferior vena cava size with normal inspiratory collapse (>50%). Borderline enlarged mid ascending aorta diameter of 41 mm. Upper limit of normal of the mid ascending aorta, for age, sex and BSA is 40 mm. No atrial level shunt   by color flow imaging. No intracardiac mass or thrombus, but the left atrial appendage cannot be visualized adequately with transthoracic echo to exclude thrombus in this location. Prominent anterior epicardial fat layer. No  pericardial effusion.   Compared to the report of 07/18/2022 no significant change has occurred.       Thank you for this consultation.  Appreciate the opportunity to participate in the care of Katie NANCE Williams, please feel free to contact us for any questions or concerns.    Richar Moore MD  [facility]  Phone: #[phone]

## 2022-12-16 NOTE — PLAN OF CARE
Problem: Knee Arthroplasty  Goal: Acceptable Pain Control  Outcome: Progressing   Problem: Knee Arthroplasty  Goal: Anesthesia/Sedation Recovery  Intervention: Optimize Anesthesia Recovery  Recent Flowsheet Documentation  Taken 12/16/2022 1420 by Margarita Grace RN  Safety Promotion/Fall Prevention:   activity supervised   bed alarm on   nonskid shoes/slippers when out of bed   lighting adjusted  Administration (IS): instruction provided, initial  Level Incentive Spirometer (mL): 1400  Incentive Spirometer Predicted Level (mL): 2000  Number of Repetitions (IS): 5  Patient Tolerance (IS): good    Pt A/O x 4, VSS. Pt denies pain at this time. Pt denies numbness and tingling of lower extremities. Pt brought own walker from home and would like it adjusted by PT this afternoon. Pt able to use call light and make needs known. Pt plans to discharge on home to 12/17.

## 2022-12-16 NOTE — ANESTHESIA POSTPROCEDURE EVALUATION
Patient: Katie Kramer    Procedure: Procedure(s):  REVISION OF RIGHT TOTAL KNEE ARTHROPLASTY       Anesthesia Type:  Spinal    Note:  Disposition: Inpatient   Postop Pain Control: Uneventful            Sign Out: Well controlled pain   PONV: No   Neuro/Psych: Uneventful            Sign Out: Acceptable/Baseline neuro status   Airway/Respiratory: Uneventful            Sign Out: Acceptable/Baseline resp. status   CV/Hemodynamics: Uneventful            Sign Out: Acceptable CV status   Other NRE:    DID A NON-ROUTINE EVENT OCCUR?            Last vitals:  Vitals Value Taken Time   /93 12/16/22 1400   Temp 36.4  C (97.6  F) 12/16/22 1300   Pulse 110 12/16/22 1404   Resp 16 12/16/22 1330   SpO2 95 % 12/16/22 1404   Vitals shown include unvalidated device data.    Electronically Signed By: Yon Prakash MD  December 16, 2022  2:53 PM

## 2022-12-16 NOTE — PHARMACY-ADMISSION MEDICATION HISTORY
Pharmacy Note - Admission Medication History    Pertinent Provider Information:  ______________________________________________________________________    Prior To Admission (PTA) med list completed and updated in EMR.       PTA Med List   Medication Sig Last Dose     amLODIPine (NORVASC) 5 MG tablet Take 5 mg by mouth every evening 12/15/2022     ARIPiprazole (ABILIFY) 2 MG tablet Take 2 mg by mouth every evening 12/15/2022     aspirin 81 MG chewable tablet Take 81 mg by mouth daily 12/7/2022     atorvastatin (LIPITOR) 40 MG tablet Take 40 mg by mouth every evening 12/15/2022     buPROPion (WELLBUTRIN SR) 200 MG 12 hr tablet Take 200 mg by mouth daily 12/16/2022     calcium carbonate-vitamin D (CALTRATE) 600-10 MG-MCG per tablet Take 1 tablet by mouth daily 12/13/2022     empagliflozin (JARDIANCE) 10 MG TABS tablet Take 10 mg by mouth daily 12/16/2022     ferrous sulfate (FE TABS) 325 (65 Fe) MG EC tablet Take 325 mg by mouth every evening 12/15/2022     hydrocortisone 2.5 % ointment Apply topically 2 times daily as needed      LORazepam (ATIVAN) 1 MG tablet Take 1 mg by mouth every 6 hours as needed With dental work      losartan (COZAAR) 25 MG tablet Take 25 mg by mouth every evening 12/15/2022     metoprolol succinate ER (TOPROL XL) 100 MG 24 hr tablet Take 100 mg by mouth daily 12/16/2022 at AM     metroNIDAZOLE (METROCREAM) 0.75 % external cream Apply topically 2 times daily 12/15/2022 at Pt brought     Multiple Vitamins-Minerals (PRESERVISION/LUTEIN PO) Take 1 tablet by mouth 2 times daily 12/13/2022     omeprazole-sodium bicarbonate (ZEGERID)  MG per capsule Take 1 capsule by mouth every morning (before breakfast) 12/16/2022     polyethylene glycol (MIRALAX/GLYCOLAX) powder Take 17 g by mouth nightly as needed      sertraline (ZOLOFT) 100 MG tablet Take 200 mg by mouth daily 12/16/2022     spironolactone (ALDACTONE) 25 MG tablet Take 25 mg by mouth every evening 12/14/2022     triamcinolone (KENALOG)  0.1 % cream 2 times daily as needed Do not use on face, axilla or genital area      zolpidem (AMBIEN) 10 MG tablet Take 10 mg by mouth nightly as needed for sleep rare        Information source(s): Patient and CareEverywhere/SureScripts    Method of interview communication: in-person    Patient was asked about OTC/herbal products specifically.  PTA med list reflects this.    Based on the pharmacist's assessment, the PTA med list information appears reliable    Allergies were reviewed, assessed, and updated with the patient.      Medications available for use during hospital stay: metronidazole cream.      Thank you for the opportunity to participate in the care of this patient.      Terri Briseno RPH     12/16/2022     9:15 AM

## 2022-12-16 NOTE — OR NURSING
Revision total knee arthroplasty bone cuts will be disposed of at the end of the procedure in a yellow hazard bag per hospital policy. Surgeon stated the bone did not need to be sent to pathology.    Explanted implants were also disposed of in yellow hazard bag per hospital policy.

## 2022-12-16 NOTE — INTERVAL H&P NOTE
"I have reviewed the surgical (or preoperative) H&P that is linked to this encounter, and examined the patient. There are no significant changes    Clinical Conditions Present on Arrival:  Clinically Significant Risk Factors Present on Admission                    # Obesity: Estimated body mass index is 37.84 kg/m  as calculated from the following:    Height as of this encounter: 1.638 m (5' 4.5\").    Weight as of this encounter: 101.6 kg (223 lb 14.4 oz).       "

## 2022-12-16 NOTE — PROGRESS NOTES
12/16/22 1600   Appointment Info   Signing Clinician's Name / Credentials (PT) Quynh Marroquin, PT, DPT   Living Environment   People in Home spouse   Current Living Arrangements house   Home Accessibility stairs to enter home;stairs within home   Number of Stairs, Main Entrance 3   Stair Railings, Main Entrance railing on left side (ascending)   Number of Stairs, Within Home, Primary greater than 10 stairs   Stair Railings, Within Home, Primary railings safe and in good condition   Transportation Anticipated family or friend will provide   Living Environment Comments Pt lives in a house with her . 3 EST[address]. All needs met on main level. Pt would like to be able to go to the basement by [employer].   Self-Care   Usual Activity Tolerance moderate   Current Activity Tolerance fair   Equipment Currently Used at Home none   Fall history within last six months no   Activity/Exercise/Self-Care Comment Pt is independent with all mobility at baseline. Pt brought a FWW to use that she is borrowing from a friend.   General Information   Onset of Illness/Injury or Date of Surgery 12/16/22   Referring Physician James Roberson MD   Patient/Family Therapy Goals Statement (PT) Return home   Pertinent History of Current Problem (include personal factors and/or comorbidities that impact the POC) s/p R TKA revision   Existing Precautions/Restrictions no pivoting or twisting;weight bearing   Weight-Bearing Status - LLE full weight-bearing   Weight-Bearing Status - RLE weight-bearing as tolerated   Range of Motion (ROM)   Range of Motion ROM deficits secondary to surgical procedure   Strength (Manual Muscle Testing)   Strength (Manual Muscle Testing) No deficits observed during functional mobility   Bed Mobility   Bed Mobility supine-sit   Supine-Sit K[place]y (Bed Mobility) contact guard   Bed Mobility Limitations decreased ability to use legs for bridging/pushing   Impairments Contributing to Impaired Bed Mobility  pain;decreased ROM;decreased strength   Transfers   Transfers sit-stand transfer   Maintains Weight-bearing Status (Transfers) able to maintain   Transfer Safety Concerns Noted losing balance backward   Impairments Contributing to Impaired Transfers impaired balance;pain;decreased ROM;decreased strength   Sit-Stand Transfer   Sit-Stand D[place] (Transfers) contact guard   Assistive Device (Sit-Stand Transfers) walker, front-wheeled   Gait/Stairs (Locomotion)   D[place] Level (Gait) contact guard   Assistive Device (Gait) walker, front-wheeled   Distance in Feet (Gait) 25'   Pattern (Gait) step-to   Deviations/Abnormal Patterns (Gait) rosa maria decreased;gait speed decreased;stride length decreased;weight shifting decreased   Maintains Weight-bearing Status (Gait) able to maintain   Clinical Impression   PT Diagnosis (PT) Impaired functional mobility   Influenced by the following impairments Pain, decreased ROM, decreased strength   Functional limitations due to impairments Bed mobility, transfers, gait, stairs   Clinical Presentation (PT Evaluation Complexity) Stable/Uncomplicated   Clinical Presentation Rationale Pt presents as medically diagnosed.   Clinical Decision Making (Complexity) low complexity   Planned Therapy Interventions (PT) bed mobility training;gait training;home exercise program;patient/family education;ROM (range of motion);stair training;strengthening;stretching;transfer training;home program guidelines   Anticipated Equipment Needs at Discharge (PT) walker, rolling  (FWW)   Risk & Benefits of therapy have been explained evaluation/treatment results reviewed;care plan/treatment goals reviewed;patient;spouse/significant other   PT Total Evaluation Time   PT Isreal Low Complexity Minutes (47772) 10   Plan of Care Review   Plan of Care Reviewed With patient;spouse   Physical Therapy Goals   PT Frequency Daily   PT Predicted Duration/Target Date for Goal Attainment 12/17/22   PT Goals Bed  Mobility;Transfers;Gait;Stairs   PT: Bed Mobility Supervision/stand-by assist;Supine to/from sit   PT: Transfers Supervision/stand-by assist;Sit to/from stand;Assistive device  (FWW)   PT: Gait Supervision/stand-by assist;Assistive device;150 feet  (FWW)   PT: Stairs Minimal assist;3 stairs;Rail on left   Interventions   Interventions Quick Adds Gait Training;Therapeutic Procedure   Therapeutic Procedure/Exercise   Ther. Procedure: strength, endurance, ROM, flexibillity Minutes (48037) 20   Symptoms Noted During/After Treatment fatigue;increased pain   Treatment Detail/Skilled Intervention Pt completed TKA HEP x 10 reps each. Supervision and verbal cues for exercise technique. Addressed pt's questions.   Gait Training   Gait Training Minutes (70318) 10   Symptoms Noted During/After Treatment (Gait Training) fatigue;increased pain   Treatment Detail/Skilled Intervention Pt ambulated ~25' in room with CGA and FWW. Verbal cues for sequencing with FWW. Step-to gait pattern. Slow and unsteady at times.   [place] Level (Gait Training) contact guard   Physical Assistance Level (Gait Training) 1 person assist;verbal cues   Weight Bearing (Gait Training) weight-bearing as tolerated   Assistive Device (Gait Training) rolling walker  (FWW)   Pattern Analysis (Gait Training) other (see comments)  (Step-to gait)   Gait Analysis Deviations decreased rosa maria;increased time in double stance;decreased step length;decreased toe-to-floor clearance;decreased weight-shifting ability   Impairments (Gait Analysis/Training) balance impaired;pain;ROM decreased;strength decreased   PT Discharge Planning   PT Plan Bed mobility, transfers, gait with FWW, stairs, review HEP if needed/if time   PT Discharge Recommendation (DC Rec) (S)  home with assist;home with outpatient physical therapy   PT Rationale for DC Rec CGA for all mobility, has FWW to use at home. Home with assist from .   PT Brief overview of current status CGA for all  mobility.   Total Session Time   Timed Code Treatment Minutes 30   Total Session Time (sum of timed and untimed services) 40

## 2022-12-16 NOTE — INTERVAL H&P NOTE
"I have reviewed the surgical (or preoperative) H&P that is linked to this encounter, and examined the patient. There are no significant changes    Clinical Conditions Present on Arrival:  Clinically Significant Risk Factors Present on Admission                    # Obesity: Estimated body mass index is 38.88 kg/m  as calculated from the following:    Height as of this encounter: 1.626 m (5' 4\").    Weight as of this encounter: 102.7 kg (226 lb 8 oz).       "

## 2022-12-16 NOTE — PLAN OF CARE
Goal Outcome Evaluation:Patient vital signs are at baseline: Yes  Patient able to ambulate as they were prior to admission or with assist devices provided by therapies during their stay:  Yes  Patient MUST void prior to discharge:  Yes  Patient able to tolerate oral intake:  Yes  Pain has adequate pain control using Oral analgesics:  Yes  Does patient have an identified :  Yes  Has goal D/C date and time been discussed with patient:  Yes     Pt ambulated in halls twice this shift. Voiding without difficulty. PRN oxycodone x 1 this shift.

## 2022-12-16 NOTE — ANESTHESIA PROCEDURE NOTES
Adductor canal Procedure Note    Pre-Procedure   Staff -        Anesthesiologist:  Yon Prakash MD       Performed By: anesthesiologist       Location: pre-op       Procedure Start/Stop Times: 12/16/2022 9:35 AM and 12/16/2022 9:40 AM       Pre-Anesthestic Checklist: patient identified, IV checked, site marked, risks and benefits discussed, informed consent, monitors and equipment checked, pre-op evaluation, at physician/surgeon's request and post-op pain management  Timeout:       Correct Patient: Yes        Correct Procedure: Yes        Correct Site: Yes        Correct Position: Yes        Correct Laterality: Yes        Site Marked: Yes  Procedure Documentation  Procedure: Adductor canal       Laterality: right       Patient Position: supine       Patient Prep/Sterile Barriers: sterile gloves, mask       Skin prep: Chloraprep       Needle Type: other (Stimuplex)       Needle Gauge: 20.        Needle Length (Inches): 4        Ultrasound guided       1. Ultrasound was used to identify targeted nerve, plexus, vascular marker, or fascial plane and place a needle adjacent to it in real-time.       2. Ultrasound was used to visualize the spread of anesthetic in close proximity to the above referenced structure.       3. A permanent image is entered into the patient's record.       4. The visualized anatomic structures appeared normal.       5. There were no apparent abnormal pathologic findings.    Assessment/Narrative         The placement was negative for: blood aspirated and painful injection       Paresthesias: No.       Bolus given via needle..        Secured via.        Complications: none       Injection made incrementally with aspirations every 5 mL.    Medication(s) Administered   Bupivacaine 0.5% PF (Infiltration) - Infiltration   15 mL - 12/16/2022 9:40:00 AM  Medication Administration Time: 12/16/2022 9:35 AM      FOR [facility] ([facility]) ONLY:   Pain Team Contact information: please page the Pain  "Team Via [facility]. Search \"Pain\". During daytime hours, please page the attending first. At night please page the resident first.    "

## 2022-12-16 NOTE — ANESTHESIA PREPROCEDURE EVALUATION
Anesthesia Pre-Procedure Evaluation    Patient: Katie Kramer   MRN: [id number] : 1950        Procedure : Procedure(s):  REVISION OF RIGHT TOTAL KNEE ARTHROPLASTY          Past Medical History:   Diagnosis Date     Anxiety      Anxiety      Arthritis      Arthritis      Asthma      Chronic fatigue      Chronic fatigue      Congestive heart failure (H)      Depression      Eosinophilic esophagitis      Esophageal dysmotility      GERD (gastroesophageal reflux disease)      GERD (gastroesophageal reflux disease)      History of anesthesia complications      Hypercalcemia     Treated via parathyroidectomy x1     Hypertension      Hypertension      Irregular heart beat      Macular degeneration      Macular degeneration      OCD (obsessive compulsive disorder)      Perspiration excessive      PONV (postoperative nausea and vomiting)      Rosacea      Sleep apnea      Sleep apnea      Uncomplicated asthma       Past Surgical History:   Procedure Laterality Date     ARTHROSCOPY KNEE Right      BLEPHAROPLASTY Bilateral      BUNIONECTOMY Bilateral      CARPAL TUNNEL RELEASE RT/LT      knee replacement     CV CORONARY ANGIOGRAM N/A 2021    Procedure: Coronary Angiogram;  Surgeon: Oly Woody MD;  Location: [facility] Cardiac Cath Lab;  Service: Cardiology     CV LEFT HEART CATHETERIZATION WITHOUT LEFT VENTRICULOGRAM Left 2021    Procedure: Left Heart Catheterization Without Left Ventriculogram;  Surgeon: Oly Woody MD;  Location: [facility] Cardiac Cath Lab;  Service: Cardiology     DILATION AND CURETTAGE       GYN SURGERY      hysterectomy     HYSTERECTOMY      Right ovary remains     JOINT REPLACEMENT       OTHER SURGICAL HISTORY      [place]     PARATHYROIDECTOMY       PARATHYROIDECTOMY      Partial x1     RELEASE CARPAL TUNNEL Bilateral      ZZC TOTAL KNEE ARTHROPLASTY Left 2018    Procedure: LEFT TOTAL KNEE ARTHROPLASTY;  Surgeon: Osito Harrell MD;  Location: [facility];   Service: Orthopedics     [facility] TOTAL KNEE ARTHROPLASTY Right 11/27/2018    Procedure: RIGHT TOTAL KNEE ARTHROPLASTY;  Surgeon: Osito Harrell MD;  Location: [facility] Main OR;  Service: Orthopedics      Allergies   Allergen Reactions     Adhesive Tape      Animal Dander      Morphine      Seasonal Allergies       Social History     Tobacco Use     Smoking status: Never     Smokeless tobacco: Never   Substance Use Topics     Alcohol use: No      Wt Readings from Last 1 Encounters:   12/14/22 101.6 kg (223 lb 14.4 oz)        Anesthesia Evaluation   Pt has had prior anesthetic. Type: General.    History of anesthetic complications  - PONV.      ROS/MED HX  ENT/Pulmonary: Comment: Recent UPPP surgery at [place] in 8/2022    (+) sleep apnea, asthma Treatment: Inhaler prn,      Neurologic:    (-) no seizures and no CVA   Cardiovascular:     (+) hypertension--CAD (mild noted on [facility] in 2021) ---CHF etiology: diastolic Last EF: 65% date: 11/23/2022 dysrhythmias (SVT), Previous cardiac testing   Echo: Date: 11/23/2022 Results:  Final Impressions   1. This study was performed per research protocol IRB # [id number].   2. Normal left ventricular chamber size. Calculated ejection fraction 65%.   3. No regional wall motion abnormalities.   4. Normal right ventricular chamber size and systolic function.   5. Estimated right ventricular systolic pressure 25 mmHg (right atrial pressure of 5 mmHg).     Stress Test: Date: Results:    ECG Reviewed: Date: 11/23/2022 Results:  Sinus rhythm   Normal ECG     Cath: Date: Results:      METS/Exercise Tolerance: >4 METS Comment: Recently went on cruise. States she was up to [address]903 steps in a day.    Hematologic:       Musculoskeletal:       GI/Hepatic:     (+) GERD, esophageal disease (Achalasia),     Renal/Genitourinary:  - neg Renal ROS     Endo:  - neg endo ROS     Psychiatric/Substance Use:     (+) psychiatric history anxiety and depression     Infectious Disease:       Malignancy:       Other:             Physical Exam    Airway        Mallampati: III   TM distance: > 3 FB   Neck ROM: full   Mouth opening: > 3 cm    Respiratory Devices and Support         Dental  no notable dental history         Cardiovascular          Rhythm and rate: regular and normal     Pulmonary           breath sounds clear to auscultation           OUTSIDE LABS:  CBC:   Lab Results   Component Value Date    WBC 7.3 06/09/2021    WBC 8.0 06/08/2021    HGB 12.2 06/09/2021    HGB 11.3 (L) 06/08/2021    HCT 38.1 06/09/2021    HCT 34.8 (L) 06/08/2021     06/09/2021     06/08/2021     BMP:   Lab Results   Component Value Date     06/09/2021     06/08/2021    POTASSIUM 4.1 06/09/2021    POTASSIUM 3.6 06/08/2021    CHLORIDE 108 (H) 06/09/2021    CHLORIDE 105 06/08/2021    CO2 25 06/09/2021    CO2 27 06/08/2021    BUN 13 06/09/2021    BUN 15 06/08/2021    CR 0.73 06/09/2021    CR 0.77 06/08/2021    GLC 96 06/09/2021     06/08/2021     COAGS:   Lab Results   Component Value Date    PTT 30 11/14/2019    INR 0.93 11/14/2019     POC: No results found for: BGM, HCG, HCGS  HEPATIC:   Lab Results   Component Value Date    ALBUMIN 3.5 06/08/2021    PROTTOTAL 5.7 (L) 06/08/2021    ALT 16 06/08/2021    AST 14 06/08/2021    ALKPHOS 95 06/08/2021    BILITOTAL 0.4 06/08/2021     OTHER:   Lab Results   Component Value Date    CLEM 8.8 06/09/2021    MAG 2.2 06/09/2021       Anesthesia Plan    ASA Status:  3   NPO Status:  NPO Appropriate    Anesthesia Type: Spinal.      Maintenance: TIVA.        Consents    Anesthesia Plan(s) and associated risks, benefits, and realistic alternatives discussed. Questions answered and patient/representative(s) expressed understanding.     - Discussed: Risks, Benefits and Alternatives for BOTH SEDATION and the PROCEDURE were discussed     - Discussed with:  Patient, Spouse      - Extended Intubation/Ventilatory Support Discussed: No.      - Patient is DNR/DNI Status: No    Use of blood  products discussed: No .     Postoperative Care    Pain management: Peripheral nerve block (Single Shot).   PONV prophylaxis: Ondansetron (or other 5HT-3), Dexamethasone or Solumedrol, Background Propofol Infusion     Comments:    Other Comments: Diastolic HF, will be judicious with fluids. Spinal + adductor canal PNB for POP per surgeon request.   12/16/2022 labs hgb 13.1, Platelet 371, K 4.5            Yon Prakash MD

## 2022-12-16 NOTE — ANESTHESIA PROCEDURE NOTES
"Intrathecal injection Procedure Note    Pre-Procedure   Staff -        Anesthesiologist:  Yon Prakash MD       Performed By: anesthesiologist       Location: OR       Procedure Start/Stop Times: 12/16/2022 9:57 AM and 12/16/2022 10:05 AM       Pre-Anesthestic Checklist: patient identified, IV checked, risks and benefits discussed, informed consent, monitors and equipment checked, pre-op evaluation, at physician/surgeon's request and post-op pain management  Timeout:       Correct Patient: Yes        Correct Procedure: Yes        Correct Site: Yes        Correct Position: Yes   Procedure Documentation  Procedure: intrathecal injection       Patient Position: sitting       Patient Prep/Sterile Barriers: sterile gloves, mask, patient draped       Skin prep: Chloraprep       Insertion Site: L3-4. (midline approach).       Needle Gauge: 25.        Needle Length (Inches): 4        Spinal Needle Type: Pencan       Introducer used       Introducer: 20 G       # of attempts: 3 and  # of redirects:     Assessment/Narrative         Paresthesias: No.       CSF fluid: clear.       Opening pressure was cmH2O while  Sitting.      Medication(s) Administered   0.5% Bupivacaine PF (Intrathecal) - Intrathecal   2.7 mL - 12/16/2022 10:05:00 AM  Medication Administration Time: 12/16/2022 9:57 AM     Comments:  Hands washed prior to procedure. The lower back was prepped and draped using sterile technique with sterile gloves, mask, hat, and sterile drape. Sterile prep was allowed to dry for 3 minutes prior to placing spinal.       FOR [facility] ([facility]) ONLY:   Pain Team Contact information: please page the Pain Team Via [employer]. Search \"Pain\". During daytime hours, please page the attending first. At night please page the resident first.    "

## 2022-12-16 NOTE — ANESTHESIA CARE TRANSFER NOTE
Patient: Katie Kramer    Procedure: Procedure(s):  REVISION OF RIGHT TOTAL KNEE ARTHROPLASTY       Diagnosis: Failure of total knee arthroplasty (H) [T84.018A, Z96.659]  Painful total knee replacement, right (H) [T84.84XA, Z96.651]  Diagnosis Additional Information: No value filed.    Anesthesia Type:   Spinal     Note:    Oropharynx: oropharynx clear of all foreign objects  Level of Consciousness: awake  Oxygen Supplementation: face mask    Independent Airway: airway patency satisfactory and stable  Dentition: dentition unchanged  Vital Signs Stable: post-procedure vital signs reviewed and stable  Report to RN Given: handoff report given  Patient transferred to: PACU  Comments: Handoff written by MD for CRNA  Handoff Report: Identifed the Patient, Identified the Reponsible Provider, Reviewed the pertinent medical history, Discussed the surgical course, Reviewed Intra-OP anesthesia mangement and issues during anesthesia, Set expectations for post-procedure period and Allowed opportunity for questions and acknowledgement of understanding      Vitals:  Vitals Value Taken Time   /93 12/16/22 1400   Temp 36.4  C (97.6  F) 12/16/22 1300   Pulse 110 12/16/22 1404   Resp 16 12/16/22 1330   SpO2 95 % 12/16/22 1404   Vitals shown include unvalidated device data.    Electronically Signed By: Yon Prakash MD  December 16, 2022  2:54 PM

## 2022-12-16 NOTE — OP NOTE
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    PROCEDURE PERFORMED   Right revision total knee arthroplasty, tibial and femoral components    IMPLANTS  Implant Name Type Inv. Item Serial No.  Lot No. LRB No. Used Action   BONE CEMENT SIMPLEX FULL DOSE 6191-1-001 - [id number] Cement, Bone BONE CEMENT SIMPLEX FULL DOSE 6191-1-001  [employer] [id number] Right 1 Implanted   Attune Total Knee Femoral component      Right 1 Explanted   Depuy Attune Tibial component      Right 1 Explanted   Depuy Attune Tibial Insert      Right 1 Explanted   ATTUNE CRS FEMORAL RT SZ 5 MADELAINE - [id number] Total Joint Component/Insert ATTUNE CRS FEMORAL RT SZ 5 MADELAINE  [employer]- [id number] Right 1 Implanted   ATUN FEM SLV M/L 40MM FULL POR - [id number] Total Joint Component/Insert ATUN FEM SLV M/L 40MM FULL POR  [employer]- [id number] Right 1 Implanted   ATUN TIB SLV M/L 37MM FULL POR - [id number] Total Joint Component/Insert ATUN TIB SLV M/L 37MM FULL POR  [employer]- [id number] Right 1 Implanted   BASEPLATE TIBIAL SZ-5 CRS ROTATING PLATFORM INSERT AOX 6MM - [id number] Total Joint Component/Insert BASEPLATE TIBIAL SZ-5 CRS ROTATING PLATFORM INSERT AOX 6MM  [employer]- [id number] Right 1 Implanted     SURGEON  James Roberson MD     ASSISTANT   Laura Trevino PA-C; assistant was required for patient positioning, surgical assistance, wound closure and monitoring patient's safety throughout the case.    ANESTHESIA  Choice      FINDINGS:  1.  Loosening of tibial baseplate.  2.  No evidence of infection.    SPECIMENS:  Joint fluid sent for culture.     ESTIMATED BLOOD LOSS:  50  cc    COMPLICATIONS   None.        INDICATION FOR PROCEDURE  Katie Kramer is a 72 year old female who I evaluated in my clinic for severe RIGHT knee pain.  She is status post total knee arthroplasty.  Unfortunately, she has gone on to develop pain.  Bone scan is suspected for tibial component loosening.  Infectious work-up was negative.  Given the degree of disability she was having, we discussed revision surgery with her for aseptic loosening of the tibial baseplate.    PREOPERATIVE EXAMINATION:   Well-healed right knee incision.    INFORMED CONSENT  Katie Kramer was identified in the preoperative holding area and was identified using medical record number, name, and date of birth, all of which were confirmed. The operative extremity was marked using an indelible marker. Once again, the risks, benefits and expected outcomes of the procedure were discussed in full.  This discussion included, but was not limited to: Bleeding, nerve/vascular injury, fracture, instability, implant complications, continued pain, stiffness, scarring/incision numbness, infection, anesthetic/medical complications, death.  After this discussion patient elected to proceed with procedure, and did sign informed consent.    DESCRIPTION OF PROCEDURE   Katie Kramer received a regional block in the preoperative holding area.  They were then brought back to the operating room and placed on the operating table in the supine position.  All bony prominences were well-padded.  A well-padded tourniquet was placed high on the operative thigh. The operative leg was then sterilely prepped and draped in the usual fashion with ChloraPrep.     A timeout was performed prior to the start of the procedure.  This confirmed the patient's name, correct site and side of surgery, and the procedure being performed.  Allergies were also confirmed.  All necessary implants were confirmed and available.  Administration of IV antibiotics and TXA were confirmed.  Three independent staff members agreed with the information discussed in the timeout.     The leg was exsanguinated and the tourniquet was inflated.  A midline approach to the knee was utilized.  Sharp dissection was performed down to the level of the capsule. Medial and lateral skin flaps were raised.  Medial parapatellar arthrotomy was utilized.  Normal-appearing synovial fluid was encountered, sample was taken and sent for culture.    The knee was brought down into full extension medial release was performed.  I exposed the bone implant interface of the femur circumferentially.  The knee was flexed up, the polyethylene was removed.  I removed the posterior patellar tendon soft tissue to assist with exposure.  At this point, attention was turned to her femoral component removal, as I did not feel that it would be possible to safely get the tibial component out without removing the femur.    ACL saw was utilized followed by flexible osteotomes and the femoral component was removed with minimal bone loss.  The bone behind the femoral component was intact however it is very osteoporotic likely from stress shielding from her components.  As such, I did feel that we would need sleeve fixation on the femur.  Attention was then turned to the tibia.    Use an ACL saw followed by flexible osteotome to gently loosen the remaining bone implant interface between the tibia and this was easily removed with minimal bone loss.  There was some metaphyseal bony resorption and loss and as such we definitely need a sleeve fixation on this side as well.    Attention was then turned to reconstruction.  I reamed for both the tibia and the femur sequentially.  I reamed up to a 17 mm on both sides to the appropriate depth, for press-fit 16mm stems.  I then began tibial preparation with broaching.  I broached up to an appropriate fit fill and rotational stability of the broach.  I made a freshen up cut off of the broach.  Appropriately  sized the tibia.  A trial implant was then assembled, impacted down to the appropriate rotation rotation was locked.  I punched for the keel, and then attention was then turned to the femoral reconstruction.    Given the ostial de jesus behind her component in spite of the fact that did not have much bone loss, I did need a femoral sleeve.  I broached up to good fit and rotational stability.  Freshen up cuts were made and the femur was appropriately sized.  I set the rotation based on gap balancing technique off of the tibial cut.  The 4-in-1 jig was placed and again freshen up cuts were made.  The anterior jig was placed, the box was cut.  I did not feel that I would need any augments.  A trial femoral component was then placed and attached to the sleeve which remained in the bone, trial polyethylene was placed the knee was brought down into full extension.    With a 6 mm poly-, I had appropriate lateral tension in full extension, but did have a couple millimeters of medial laxity with a firm endpoint in the MCL in full extension.  In flexion, I had good medial and lateral balance and a good anterior drawer and as such I was very happy but for the slight medial laxity as noted above.  I felt that I would need a constrained polyethylene to make up for some of this medial laxity given that her lateral structures were appropriately tensioned in full extension.    Final components were opened, assembled.  Bony surfaces were cleaned and dried following trial component removal.  Local injection was utilized throughout.    Cement was then mixed on the back table.  I finger pressurized cement in the proximal tibia, impacted the tibial component down into place, removed all excess cement and attention was turned the femoral component.  I digitally pressurized cement in the distal femur, the component was impacted down into place, the excess cement was removed.  A trial 6 mm PS polyethylene was placed the knee was brought into  full extension and held in axial traction to allow the cement to fully cure.    With the cement fully cured, the stability exam was the same as noted above and as such I inserted a millimeter constrained polyethylene which corrected for the slight medial laxity again as noted above.    The knee was thoroughly irrigated.  Serial Irrisept soaks were performed.  1 g of vancomycin powder was placed into the knee.  The arthrotomy was closed with #5 Ethibond followed by running #1 strata fix.    Subcutaneous tissues were closed with 0 Vicryl followed by 2-0 Vicryl followed by running 3-0 Monocryl for the skin, followed by skin glue.  Sterile dressing was applied.     The patient was then transferred to the postoperative bed, awoken from anesthesia and taken to recovery in stable condition.  There were no complications.  The patient tolerated the procedure well.    POSTOPERATIVE EXAM:  Palpable dorsalis pedis pulse at the end of the case.    POSTOPERATIVE PLAN:  -Pain control  -PT/OT, WBAT  -24 hours antibiotics.   2 weeks of oral cefadroxil on discharge  -ASA for DVT ppx  -X-rays to be completed in PACU    James Roberson MD  [facility]

## 2022-12-17 ENCOUNTER — APPOINTMENT (OUTPATIENT)
Dept: PHYSICAL THERAPY | Facility: CLINIC | Age: 72
DRG: 467 | End: 2022-12-17
Attending: ORTHOPAEDIC SURGERY
Payer: MEDICARE

## 2022-12-17 ENCOUNTER — APPOINTMENT (OUTPATIENT)
Dept: OCCUPATIONAL THERAPY | Facility: CLINIC | Age: 72
DRG: 467 | End: 2022-12-17
Attending: ORTHOPAEDIC SURGERY
Payer: MEDICARE

## 2022-12-17 VITALS
WEIGHT: 229.1 LBS | BODY MASS INDEX: 39.11 KG/M2 | RESPIRATION RATE: 18 BRPM | TEMPERATURE: 97.9 F | HEART RATE: 68 BPM | SYSTOLIC BLOOD PRESSURE: 108 MMHG | DIASTOLIC BLOOD PRESSURE: 63 MMHG | HEIGHT: 64 IN | OXYGEN SATURATION: 95 %

## 2022-12-17 LAB
GLUCOSE BLD-MCNC: 106 MG/DL (ref 70–125)
HGB BLD-MCNC: 11.5 G/DL (ref 11.7–15.7)

## 2022-12-17 PROCEDURE — 97165 OT EVAL LOW COMPLEX 30 MIN: CPT | Mod: GO

## 2022-12-17 PROCEDURE — 250N000013 HC RX MED GY IP 250 OP 250 PS 637: Performed by: FAMILY MEDICINE

## 2022-12-17 PROCEDURE — 97535 SELF CARE MNGMENT TRAINING: CPT | Mod: GO

## 2022-12-17 PROCEDURE — 85018 HEMOGLOBIN: CPT | Performed by: ORTHOPAEDIC SURGERY

## 2022-12-17 PROCEDURE — 97116 GAIT TRAINING THERAPY: CPT | Mod: GP

## 2022-12-17 PROCEDURE — 82947 ASSAY GLUCOSE BLOOD QUANT: CPT | Performed by: ORTHOPAEDIC SURGERY

## 2022-12-17 PROCEDURE — 250N000013 HC RX MED GY IP 250 OP 250 PS 637: Performed by: ORTHOPAEDIC SURGERY

## 2022-12-17 PROCEDURE — 250N000011 HC RX IP 250 OP 636: Performed by: ORTHOPAEDIC SURGERY

## 2022-12-17 PROCEDURE — 36415 COLL VENOUS BLD VENIPUNCTURE: CPT | Performed by: ORTHOPAEDIC SURGERY

## 2022-12-17 PROCEDURE — 97530 THERAPEUTIC ACTIVITIES: CPT | Mod: GP

## 2022-12-17 PROCEDURE — 99232 SBSQ HOSP IP/OBS MODERATE 35: CPT | Performed by: FAMILY MEDICINE

## 2022-12-17 RX ADMIN — BUPROPION HYDROCHLORIDE 200 MG: 100 TABLET, FILM COATED, EXTENDED RELEASE ORAL at 09:15

## 2022-12-17 RX ADMIN — SERTRALINE HYDROCHLORIDE 200 MG: 100 TABLET, FILM COATED ORAL at 09:09

## 2022-12-17 RX ADMIN — KETOROLAC TROMETHAMINE 15 MG: 30 INJECTION, SOLUTION INTRAMUSCULAR; INTRAVENOUS at 03:30

## 2022-12-17 RX ADMIN — CEFAZOLIN SODIUM 2 G: 2 INJECTION, SOLUTION INTRAVENOUS at 03:29

## 2022-12-17 RX ADMIN — METOPROLOL SUCCINATE 100 MG: 100 TABLET, EXTENDED RELEASE ORAL at 09:09

## 2022-12-17 RX ADMIN — OXYCODONE HYDROCHLORIDE 5 MG: 5 TABLET ORAL at 06:59

## 2022-12-17 RX ADMIN — KETOROLAC TROMETHAMINE 15 MG: 30 INJECTION, SOLUTION INTRAMUSCULAR; INTRAVENOUS at 09:10

## 2022-12-17 RX ADMIN — SENNOSIDES AND DOCUSATE SODIUM 1 TABLET: 50; 8.6 TABLET ORAL at 09:09

## 2022-12-17 RX ADMIN — PANTOPRAZOLE SODIUM 40 MG: 20 TABLET, DELAYED RELEASE ORAL at 09:09

## 2022-12-17 RX ADMIN — EMPAGLIFLOZIN 10 MG: 10 TABLET, FILM COATED ORAL at 09:15

## 2022-12-17 RX ADMIN — ACETAMINOPHEN 975 MG: 325 TABLET ORAL at 06:57

## 2022-12-17 RX ADMIN — ASPIRIN 81 MG: 81 TABLET, COATED ORAL at 09:09

## 2022-12-17 ASSESSMENT — ACTIVITIES OF DAILY LIVING (ADL)
ADLS_ACUITY_SCORE: 21

## 2022-12-17 NOTE — PROGRESS NOTES
12/17/22 0741   Appointment Info   Signing Clinician's Name / Credentials (OT) Devora Pérez OT OTD   Living Environment   People in Home spouse   Current Living Arrangements house   Home Accessibility stairs to enter home;stairs within home   Number of Stairs, Main Entrance 3   Stair Railings, Main Entrance railing on left side (ascending)   Number of Stairs, Within Home, Primary greater than 10 stairs   Living Environment Comments walk in shower with grab bar, grab bar by toilet with comfort height toilet   Self-Care   Equipment Currently Used at Home none   Fall history within last six months no   Activity/Exercise/Self-Care Comment Pt ind with all ADLs and IADLs, still driving   General Information   Onset of Illness/Injury or Date of Surgery 12/16/22   Referring Physician James Roberson MD   Patient/Family Therapy Goal Statement (OT) To heal safely   Additional Occupational Profile Info/Pertinent History of Current Problem Katie Kramer is a 72 year old female with a PMH of HTN, HFpEF, hyperlipidemia, KERLINE, palpitations, anxiety, depression, OCD, GERD with esophagitis and chronic fatigue   Existing Precautions/Restrictions no known precautions/restrictions   Right Lower Extremity (Weight-bearing Status) weight-bearing as tolerated (WBAT)   Cognitive Status Examination   Orientation Status orientation to person, place and time   Visual Perception   Visual Impairment/Limitations WFL;corrective lenses full-time   Posture   Posture not impaired   Range of Motion Comprehensive   General Range of Motion no range of motion deficits identified   Strength Comprehensive (MMT)   General Manual Muscle Testing (MMT) Assessment no strength deficits identified   Coordination   Upper Extremity Coordination No deficits were identified   Bed Mobility   Bed Mobility sit-supine   Sit-Supine [place] (Bed Mobility) contact guard   Comment (Bed Mobility) Bed flat, no bed rails   Transfers   Transfers sit-stand transfer    Sit-Stand Transfer   Sit-Stand [place] (Transfers) contact guard   Assistive Device (Sit-Stand Transfers) walker, standard   Activities of Daily Living   BADL Assessment/Intervention lower body dressing   Lower Body Dressing Assessment/Training   [place] Level (Lower Body Dressing) minimum assist (75% patient effort)   Clinical Impression   Criteria for Skilled Therapeutic Interventions Met (OT) Yes, treatment indicated   OT Diagnosis Decreased ind with ADLs   Influenced by the following impairments S/p TKA revision   OT Problem List-Impairments impacting ADL pain;post-surgical precautions   Assessment of Occupational Performance 1-3 Performance Deficits   Identified Performance Deficits dressing, toileting, bed mobility   Planned Therapy Interventions (OT) ADL retraining;bed mobility training   Clinical Decision Making Complexity (OT) moderate complexity   Risk & Benefits of therapy have been explained evaluation/treatment results reviewed;care plan/treatment goals reviewed;patient   OT Total Evaluation Time   OT Eval, Low Complexity Minutes (13384) 8   OT Goals   Therapy Frequency (OT) One time eval and treatment   OT Predicted Duration/Target Date for Goal Attainment 12/17/22   OT Goals Lower Body Dressing;Bed Mobility;Toilet Transfer/Toileting   OT: Lower Body Dressing Supervision/stand-by assist;within precautions;including set-up/clothing retrieval;Goal Met;Completed   OT: Bed Mobility Modified independent;supine to/from sitting;within precautions;Goal Met;Completed   OT: Toilet Transfer/Toileting Modified independent;toilet transfer;within precautions;Goal Met;Completed   Interventions   Interventions Quick Adds Self-Care/Home Management   Self-Care/Home Management   Self-Care/Home Mgmt/ADL, Compensatory, Meal Prep Minutes (95375) 24   Symptoms Noted During/After Treatment (Meal Preparation/Planning Training) none   Treatment Detail/Skilled Intervention Educated pt on TKA rev precautions related  to ADL tasks, hand out provided. Instructed on LB dressing - threading surgical extremity first, following demo pt donned sweatpants w/ SBA, min cueing for technique. Donned socks and shoes SBA with extended time, bending at trunk. UB dressing ind. Fx mob to/from bathroom SBA w/ FWW, min cueing for walker safety with small bathroom home set up. Instructed on hand placement for controlled descent with toilet transfer, completed mod (I) following instruction. Reviewed safety with shower transfer and timing with pain meds/pain, demos understanding. Instructed on proper bed mobility and sleep/rest positions, completed bed mob mod (I) following instruction. Returned to bedside chair, all questions answered, ice applied to R knee.   OT Discharge Planning   OT Plan D/c OT   OT Discharge Recommendation (DC Rec) home with assist   OT Rationale for DC Rec Pt mobilizing well and met all OT goals, great support from  at home   OT Brief overview of current status SBA mobility, mod I-SBA with all ADLs   Total Session Time   Timed Code Treatment Minutes 24   Total Session Time (sum of timed and untimed services) 32

## 2022-12-17 NOTE — PROGRESS NOTES
Occupational Therapy Discharge Summary    Reason for therapy discharge:    All goals and outcomes met, no further needs identified.    Progress towards therapy goal(s). See goals on Care Plan in Psychiatric electronic health record for goal details.  Goals met    Therapy recommendation(s):    No further therapy is recommended.

## 2022-12-17 NOTE — PROGRESS NOTES
[facility] MEDICINE  PROGRESS NOTE     Code Status: Full Code  Procedure(s):  REVISION OF RIGHT TOTAL KNEE ARTHROPLASTY  1 Day Post-Op  Identification/Summary:   72 year old female with a PMH of HTN, HFpEF, hyperlipidemia, KERLINE, palpitations, anxiety, depression, OCD, GERD with esophagitis and chronic fatigue..  Underwent revision right total knee arthroplasty.  Postoperatively doing well.  Medically cleared for discharge by orthopedics.     Assessment and Plan:  -Status post revision right total knee arthroplasty  Had loosening of her previous right knee components from 4 years ago.  Pain management, physical therapy and discharge disposition per orthopedics.  -Acute blood loss anemia  Preoperative hemoglobin 13.1 now down to 11.5.  Medication for transfusion at this time.  Follow per protocols.  Recommend dietary increase of iron.  No rechecks as an outpatient necessary.  -Essential hypertension  -Heart failure with preserved ejection fraction  11/7/2022 echocardiogram done at [facility] showed preserved EF of 65%.  No wall motion abnormalities.  No change from 7/18/2022.  Continue home Norvasc, metoprolol, spironolactone and losartan with hold parameters.  Patient is on Jardiance under treatment study by [facility] for heart failure management.  A1c 5.7.  Continue home Jardiance.  No need to check sugars.  -Anxiety/depression  -OCD  Continue home Abilify, Wellbutrin, lorazepam, Ambien and Zoloft.  -Rosacea  Continue home metronidazole cream.  -GERD  -Eosinophilic esophagitis  Continue home Xagrid.  -Hyperlipidemia  Continue home Lipitor.     COVID-19 PCR no testing necessary per policy  Noted.  Anticoagulation.    Aspirin 81 mg twice daily ordered by surgery.  Routine postoperative risk.  Fluids: Per orthopedics  Pain meds: Per orthopedics  Therapy: Per orthopedics   Serna:Not present  Current Diet  Orders Placed This Encounter      Advance Diet as Tolerated: Regular Diet Adult       "Discharge Instruction - Regular Diet Adult    Supplements  None    Barriers to Discharge: Medically cleared for discharge by orthopedics    Disposition: Discharge medication reconciliation reviewed and our area of responsibility was addressed.    Clinically Significant Risk Factors                        # Obesity: Estimated body mass index is 39.32 kg/m  as calculated from the following:    Height as of this encounter: 1.626 m (5' 4\").    Weight as of this encounter: 103.9 kg (229 lb 1.6 oz)., PRESENT ON ADMISSION         Interval History/Subjective:  Doing well.  Pain under good control.  No chest pain.  No shortness of breath.  No nausea or vomiting.  No lightheadedness or dizziness.  Ready for discharge home.   present and supportive.  Questions answered to verbalized satisfaction.    Physical Exam/Objective:  Temp:  [97.6  F (36.4  C)-98.9  F (37.2  C)] 97.9  F (36.6  C)  Pulse:  [] 68  Resp:  [14-18] 18  BP: ()/(63-89) 108/63  SpO2:  [91 %-100 %] 95 %  Wt Readings from Last 4 Encounters:   12/17/22 103.9 kg (229 lb 1.6 oz)   07/01/21 110.5 kg (243 lb 9.6 oz)   06/10/21 108 kg (238 lb)   06/09/21 110 kg (242 lb 9.6 oz)     Body mass index is 39.32 kg/m .    Constitutional: awake, alert, cooperative, no apparent distress, and appears stated age  ENT: Normocephalic, without obvious abnormality, atraumatic, external ears without lesions, oral pharynx with moist mucous membranes, tonsils without erythema or exudates, gums normal and good dentition.  Respiratory: No increased work of breathing, good air exchange, clear to auscultation bilaterally, no crackles or wheezing  Cardiovascular: Normal apical impulse, regular rate and rhythm, normal S1 and S2, no S3 or S4, and no murmur noted  GI: No scars, normal bowel sounds, soft, non-distended, non-tender, no masses palpated, no hepatosplenomegally  Skin: normal skin color, texture, turgor, no redness, warmth, or swelling, and no " rashes  Musculoskeletal: There is no redness, warmth, or swelling of the joints.  Motor strength is 5 out of 5 all extremities bilaterally.  Tone is normal. no lower extremity pitting edema present  Neurologic: Cranial nerves II-XII are grossly intact. Sensory:  Sensory intact  Neuropsychiatric: General: normal, calm and normal eye contact Level of consciousness: alert / normal Affect: normal Orientation: oriented to self, place, time and situation Memory and insight: normal, memory for past and recent events intact and thought process normal      Medications:   Personally Reviewed.  Medications     lactated ringers Stopped (12/16/22 1432)       acetaminophen  975 mg Oral Q8H     amLODIPine  5 mg Oral QPM     ARIPiprazole  2 mg Oral QPM     aspirin  81 mg Oral BID     atorvastatin  40 mg Oral QPM     buPROPion  200 mg Oral Daily     empagliflozin  10 mg Oral Daily     losartan  25 mg Oral QPM     metoprolol succinate ER  100 mg Oral Daily     metroNIDAZOLE   Topical BID     pantoprazole  40 mg Oral BID AC     polyethylene glycol  17 g Oral Daily     senna-docusate  1 tablet Oral BID     sertraline  200 mg Oral Daily     sodium chloride (PF)  3 mL Intracatheter Q8H     spironolactone  25 mg Oral QPM       Data reviewed today: I personally reviewed all new medications, labs, imaging/diagnostics reports over the past 24 hours. Pertinent findings include:    Imaging:   Recent Results (from the past 24 hour(s))   XR Knee Port Right 1/2 Views    Narrative    EXAM: XR KNEE PORT RIGHT 1/2 VIEWS  LOCATION: [facility]  DATE/TIME: 12/16/2022 1:31 PM    INDICATION: Postop Total Knee.  COMPARISON: None.      Impression    IMPRESSION: Postoperative changes of right total knee arthroplasty and patellar resurfacing. Postprocedural air within the joint and surrounding soft tissues. Components appear well seated.       Labs:  XR Knee Port Right 1/2 Views   Final Result   IMPRESSION: Postoperative changes of  right total knee arthroplasty and patellar resurfacing. Postprocedural air within the joint and surrounding soft tissues. Components appear well seated.      POC US Guidance Needle Placement   Final Result      POC US Guidance Needle Placement   Final Result        Recent Results (from the past 24 hour(s))   Gram Stain    Collection Time: 12/16/22 10:30 AM    Specimen: Knee, Right; Synovial fluid   Result Value Ref Range    Gram Stain Result No organisms seen     Gram Stain Result 1+ WBC seen    Synovial fluid Aerobic Bacterial Culture Routine    Collection Time: 12/16/22 10:30 AM    Specimen: Knee, Right; Synovial fluid   Result Value Ref Range    Culture No growth, less than 1 day    Hemoglobin    Collection Time: 12/17/22  5:53 AM   Result Value Ref Range    Hemoglobin 11.5 (L) 11.7 - 15.7 g/dL   Glucose    Collection Time: 12/17/22  5:53 AM   Result Value Ref Range    Glucose 106 70 - 125 mg/dL       Pending Labs:  Unresulted Labs Ordered in the Past 30 Days of this Admission     Date and Time Order Name Status Description    12/16/2022 10:30 AM Synovial fluid Aerobic Bacterial Culture Routine Preliminary     12/16/2022 10:30 AM Anaerobic Bacterial Culture Routine In process           Richar Moore MD  [facility]  Phone: #[phone]

## 2022-12-17 NOTE — PROGRESS NOTES
ORTHO PROGRESS NOTE    Procedure: R revision TKA   POD # 1  Surgeon: Dr. Roberson    SUBJECTIVE: Patient in chair comfortable this morning. States pain controlled with oxycodone and ice.     OBJECTIVE:   Vitals:   Vitals:    12/16/22 1720 12/16/22 2120 12/17/22 0005 12/17/22 0652   BP: 135/73 129/67 124/68    BP Location: Right arm Right arm Right arm    Patient Position:       Cuff Size:       Pulse: 80 75 74    Resp: 18 18 17    Temp: 98.1  F (36.7  C) 98.2  F (36.8  C) 98  F (36.7  C)    TempSrc: Oral Oral Oral    SpO2: 97% 95% 95%    Weight:    103.9 kg (229 lb 1.6 oz)   Height:           Intake/Output Summary (Last 24 hours) at 12/17/2022 0804  Last data filed at 12/17/2022 0546  Gross per 24 hour   Intake 1300 ml   Output 1075 ml   Net 225 ml        Exam:  General: Patient is in no acute distress. Alert and oriented x3. Normal respiratory effort. Distal CMS is intact. Calf is soft and non-tender. 2+ DP pulses.   RLE: Mepilex clean, dry and intact. No drainage.     Labs:  No components found for: HEMOGLOBIN    ASSESSMENT AND PLAN:    - Reviewed post operative pain expectations and IS use.  - Pain management: tylenol, vistaril, robaxin and oxycodone PRN   - Bowel Regimen, narcotic induced: MOM and senna  - Hgb monitoring, as expected: No further monitoring of hemoglobin unless patient becomes symptomatic.   - Wound management: mepilex  - Physical deconditioning: PT/OT while inpatient, continue to 50% Weight bearing (flat foot) to RLE. No restrictions on ROM.    - Revision antibiotics: 24 hours IV, then discharge 2 weeks of cefadroxil PO   - VTE prophylaxis: ASA 162mg x 6 weeks    Discharge plan: Home today pending PT/OT.     Yulisa Stover PA-C 8:04 AM 12/17/22

## 2022-12-17 NOTE — PLAN OF CARE
Goal Outcome Evaluation:  Patient vital signs are at baseline: Yes  Patient able to ambulate as they were prior to admission or with assist devices provided by therapies during their stay:  Yes  Patient MUST void prior to discharge:  Yes  Patient able to tolerate oral intake:  Yes  Pain has adequate pain control using Oral analgesics:  Yes  Does patient have an identified :  Yes  Has goal D/C date and time been discussed with patient:  Yes   Discharge pending therapy progression.

## 2022-12-19 ENCOUNTER — PATIENT OUTREACH (OUTPATIENT)
Dept: CARE COORDINATION | Facility: CLINIC | Age: 72
End: 2022-12-19

## 2022-12-19 NOTE — PROGRESS NOTES
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     PROCEDURE PERFORMED   Right revision total knee arthroplasty, tibial and femoral components    Katie Kramer is a 72 year old female who I evaluated in my clinic for severe RIGHT knee pain.  She is status post total knee arthroplasty.  Unfortunately, she has gone on to develop pain.  Bone scan is suspected for tibial component loosening.  Infectious work-up was negative.  Given the degree of disability she was having, we discussed revision surgery with her for aseptic loosening of the tibial baseplate.     ASSESSMENT      Discharge Assessment  How are you doing now that you are home?: \"I'm doing okay, as comfortable as possible. In most cases when I've had surgeries in the past, my third is alwasy the worst and that was last night so I think I've turned a corner and I'm optimistic.\"  How are your symptoms? (Red Flag symptoms escalate to triage hotline per guidelines): Improved  Do you feel your condition is stable enough to be safe at home until your provider visit?: Yes  Does the patient have their discharge instructions? : Yes  Does the patient have questions regarding their discharge instructions? : No  Were you started on any new medications or were there changes to any of your " previous medications? : Yes  Does the patient have all of their medications?: Yes  Do you have questions regarding any of your medications? : No  Do you have all of your needed medical supplies or equipment (DME)?  (i.e. oxygen tank, CPAP, cane, etc.): Yes  Discharge follow-up appointment scheduled within 14 calendar days? : Yes  Discharge Follow Up Appointment Date: 12/29/22  Discharge Follow Up Appointment Scheduled with?: Specialty Care Provider ([facility])    Post-op ([facility] Only)  If the patient had a surgery or procedure, do they have any questions for a nurse?: No      PLAN                                                      Outpatient Plan:      Follow Up Care  Please follow-up with Dr. Roberson/Laura Trevino PA-C in 2 weeks at [facility]. Call our scheduling line at [phone] to make an appointment if you do not already have one scheduled. Call [phone] to reach Dr. Roberson'  team with questions or concerns.    No future appointments.      For any urgent concerns, please contact our 24 hour nurse triage line: [phone] ([phone])       ROLAN Blankenship  [phone]  [facility]

## 2022-12-20 ENCOUNTER — DOCUMENTATION ONLY (OUTPATIENT)
Dept: OTHER | Facility: CLINIC | Age: 72
End: 2022-12-20

## 2022-12-21 LAB — BACTERIA SNV CULT: NO GROWTH

## 2022-12-30 LAB — BACTERIA SNV CULT: NORMAL

## 2023-01-03 NOTE — DISCHARGE SUMMARY
ORTHO PROGRESS NOTE    Procedure: R revision TKA   POD # 1  Surgeon: Dr. Roberson    SUBJECTIVE: Patient in chair comfortable this morning. States pain controlled with oxycodone and ice.     OBJECTIVE:   Vitals:   Vitals:    12/16/22 2120 12/17/22 0005 12/17/22 0652 12/17/22 0807   BP: 129/67 124/68  108/63   BP Location: Right arm Right arm  Right arm   Patient Position:       Cuff Size:       Pulse: 75 74  68   Resp: 18 17 18   Temp: 98.2  F (36.8  C) 98  F (36.7  C)  97.9  F (36.6  C)   TempSrc: Oral Oral  Oral   SpO2: 95% 95%  95%   Weight:   103.9 kg (229 lb 1.6 oz)    Height:           Intake/Output Summary (Last 24 hours) at 12/17/2022 0804  Last data filed at 12/17/2022 0546  Gross per 24 hour   Intake 1300 ml   Output 1075 ml   Net 225 ml        Exam:  General: Patient is in no acute distress. Alert and oriented x3. Normal respiratory effort. Distal CMS is intact. Calf is soft and non-tender. 2+ DP pulses.   RLE: Mepilex clean, dry and intact. No drainage.     Labs:  No components found for: HEMOGLOBIN    ASSESSMENT AND PLAN:    - Reviewed post operative pain expectations and IS use.  - Pain management: tylenol, vistaril, robaxin and oxycodone PRN   - Bowel Regimen, narcotic induced: MOM and senna  - Hgb monitoring, as expected: No further monitoring of hemoglobin unless patient becomes symptomatic.   - Wound management: mepilex  - Physical deconditioning: PT/OT while inpatient, continue to 50% Weight bearing (flat foot) to RLE. No restrictions on ROM.    - Revision antibiotics: 24 hours IV, then discharge 2 weeks of cefadroxil PO   - VTE prophylaxis: ASA 162mg x 6 weeks    Discharge plan: Home today pending PT/OT.     Yulisa Stover PA-C 8:04 AM 12/17/22    Patient admitted 12/16/22  Patient dicharged 12/17/22

## 2023-02-15 RX ORDER — ONDANSETRON 4 MG/1
TABLET, FILM COATED ORAL EVERY 6 HOURS PRN
COMMUNITY
End: 2023-03-24

## 2023-02-15 RX ORDER — TERAZOSIN 2 MG/1
2 CAPSULE ORAL AT BEDTIME
COMMUNITY
End: 2023-03-24

## 2023-03-09 ENCOUNTER — TRANSFERRED RECORDS (OUTPATIENT)
Dept: MULTI SPECIALTY CLINIC | Facility: CLINIC | Age: 73
End: 2023-03-09

## 2023-03-09 LAB
ALT SERPL-CCNC: 17 U/L
CREATININE (EXTERNAL): 0.84 MG/DL (ref 0.55–1.02)
GFR ESTIMATED (EXTERNAL): >60 ML/MIN/1.73M2
GLUCOSE (EXTERNAL): 81 MG/DL (ref 70–100)
HBA1C MFR BLD: 5.5 %
POTASSIUM (EXTERNAL): 4.9 MMOL/L (ref 3.5–5.1)

## 2023-03-15 RX ORDER — VANCOMYCIN HYDROCHLORIDE 1 G/20ML
1 INJECTION, POWDER, LYOPHILIZED, FOR SOLUTION INTRAVENOUS ONCE
Status: CANCELLED | OUTPATIENT
Start: 2023-03-15 | End: 2023-03-15

## 2023-03-16 NOTE — PROGRESS NOTES
Discharge plan according to [facility]:       02/15/23 1321   Discharge Planning   Patient/Family Anticipates Transition to home with family   Living Arrangements   People in Home spouse   Type of Residence Private Residence   Is your private residence a single family home or apartment? Single family home   Number of Stairs, Within Home, Primary ten   Stair Railings, Within Home, Primary railings safe and in good condition   Bathroom Shower/Tub Walk-in shower   Equipment Currently Used at Home raised toilet seat   Support System   Support Systems Spouse/Significant Other   Do you have someone available to stay with you one or two nights once you are home? Yes

## 2023-03-24 RX ORDER — AMOXICILLIN 500 MG/1
2000 CAPSULE ORAL PRN
COMMUNITY

## 2023-03-24 RX ORDER — ATORVASTATIN CALCIUM 40 MG/1
40 TABLET, FILM COATED ORAL AT BEDTIME
COMMUNITY
Start: 2021-07-01

## 2023-03-24 RX ORDER — SCOLOPAMINE TRANSDERMAL SYSTEM 1 MG/1
1 PATCH, EXTENDED RELEASE TRANSDERMAL
COMMUNITY

## 2023-03-24 RX ORDER — ASPIRIN 81 MG/1
81 TABLET, CHEWABLE ORAL DAILY
Status: ON HOLD | COMMUNITY
End: 2023-03-29

## 2023-03-24 RX ORDER — ARIPIPRAZOLE 2 MG/1
1 TABLET ORAL DAILY
COMMUNITY
Start: 2021-07-15 | End: 2023-03-24

## 2023-03-24 RX ORDER — ACETAMINOPHEN 500 MG
500 TABLET ORAL PRN
COMMUNITY
Start: 2022-12-16 | End: 2023-03-24

## 2023-03-24 RX ORDER — AMLODIPINE BESYLATE 5 MG/1
5 TABLET ORAL DAILY
COMMUNITY
Start: 2023-02-20 | End: 2023-03-24

## 2023-03-27 ENCOUNTER — ANESTHESIA EVENT (OUTPATIENT)
Dept: SURGERY | Facility: CLINIC | Age: 73
DRG: 467 | End: 2023-03-27
Payer: MEDICARE

## 2023-03-27 NOTE — TREATMENT PLAN
Orthopedic Surgery Pre-Op Plan: Katie Kramer  pre-op review. This is NOT an H&P   Surgeon: Dr. Roberson   [facility]: [facility]  Name of Surgery: Revision of Left Total Knee Arthroplasty  Date of Surgery: 3/28/23  H&P: Completed on 3/9/23 by Dr. Brenda Chavira at [facility].   History of ASA, NSAIDS, vitamin and/or herbal supplements within 10 days: Yes- ASA 81 mg daily-holding ASA for 7 days before surgery per instructions from PCP.  History of blood thinners: No    Plan:   1) Discharge Plan: Home POD 1 with assist of Spouse. Please see Discharge Planning section near bottom of this note for further details.     2) Heart Failure with Preserved Ejection Fraction (HFpEF): Follows closely with Cardiology at [facility] and is part of study there evaluating use of empagliflozin (Jardiance) in heart failure patients. Has some mild bilateral ankle edema. Denies SOB, PND or orthopnea. Able to perform > 4 METS. No history of any recent heart failure exacerbations. Monitors weight and salt intake.     Most recent Echo 2/22/23:   1. Normal left ventricular chamber size. Calculated ejection fraction 63%.   2. No regional wall motion abnormalities.   3. Normal left ventricular filling pressure at rest.   4. Normal right ventricular chamber size. and systolic function.   5. Estimated right ventricular systolic pressure 25 mmHg (right atrial pressure of 5 mmHg).   6. Compared to the report of 11/07/2022 no significant change has occurred.    Per PCP: Patient's cardiovascular condition is medically managed and stable. Stress testing not indicated... Patient is medically optimized for planned procedure. On empagliflozin, metoprolol, losartan, amlodipine, and spironolactone. I recommend close monitoring of robbin-op fluid status. If weight gain > 3 lbs in 24 hrs or any signs/symptoms of worsening heart failure, nursing to please notify Hospitalist.     3) Supraventricular Tachycardia (SVT): improved on  metoprolol.     4) Hyperlipidemia: On atorvastatin.     5) Hypertension: Well-controlled on amlodipine, losartan, metoprolol, and spironolactone.  Patient instructed to hold losartan and spironolactone on the morning of surgery but to continue taking amlodipine and metoprolol.    6) Obstructive Sleep Apnea: Severe, S/P Lateral Expansion Pharyngoplasty and Uvulopalatal Flap on 9/19/22: Per recent ENT notes 3/6/23 at [facility]: moderate obstructive sleep apnea after soft tissue surgery.  Unfortunately, did not have much success from soft tissue surgery directed at the palate.  Further options were discussed, but patient would need to first lose about 10 pounds to get BMI 35 or under before proceeding with these.  I recommend close monitoring of perioperative respiratory status.  If frequent O2 desats or apneic episodes, nursing to please notify Hospitalist.    7) PONV: Patient reports some issues with nausea and vomiting with anesthesia in the past. Morphine in particular caused significant nausea.  I recommend patient discusses this with preop nursing and anesthesia on the day of surgery.  Would likely benefit from antiemetics or other measures to prevent or minimize nausea/vomiting.    8) Prediabetes: Hemoglobin A1c 5.5 on 3/9/23. A1C 5.7 on 12/16/22. Blood glucose normal at 81 at preop exam on 3/9/23.  We will monitor BG's during hospital stay.  Goal BG <180 to decrease risk for infection and postop wound healing complications.  If BG >180, nursing to please notify Hospitalist.    9) GERD and Eosinophilic Esophagitis: On omeprazole-sodium bicarbonate (Zegerid).     10) Obesity: BMI 38, Wt: 226 lbs. I recommend continued efforts at safe weight loss following recovery from surgery.    11) Obsessive Compulsive Disorder: On Abilify.     12) Anxiety and Depression: On sertraline, bupropion, and lorazepam.    Patient appears medically optimized for upcoming surgery. I would recommend Hospitalist Consult to assist with  medical management. Please call me below with any questions on this patient.       Review of Systems Notable for: Heart failure with preserved ejection fraction-stable, SVT, hyperlipidemia, hypertension, obstructive sleep apnea, PONV, prediabetes, GERD, eosinophilic esophagitis, obesity, obsessive-compulsive disorder, anxiety, depression.    Past Medical History:   Past Medical History:   Diagnosis Date     Anemia      Anxiety      Arthritis      Chronic fatigue      Congestive heart failure (H)      Depression      Eosinophilic esophagitis      Esophageal dysmotility      GERD (gastroesophageal reflux disease)      History of anesthesia complications      Hypercalcemia     Treated via parathyroidectomy x1     Hypertension      Irregular heart beat      Macular degeneration      Obese      OCD (obsessive compulsive disorder)      Perspiration excessive      PONV (postoperative nausea and vomiting)      Rosacea      Sleep apnea     uses mouthguard     Uncomplicated asthma      Past Surgical History:   Procedure Laterality Date     ARTHROPLASTY REVISION KNEE Right 12/16/2022    Procedure: REVISION OF RIGHT TOTAL KNEE ARTHROPLASTY;  Surgeon: James Roberson MD;  Location: [facility] Main OR     ARTHROSCOPY KNEE Right      BLEPHAROPLASTY Bilateral      BUNIONECTOMY Bilateral      CARPAL TUNNEL RELEASE RT/LT      knee replacement     CV CORONARY ANGIOGRAM N/A 06/08/2021    Procedure: Coronary Angiogram;  Surgeon: Oly Woody MD;  Location: [facility] Cardiac Cath Lab;  Service: Cardiology     CV LEFT HEART CATHETERIZATION WITHOUT LEFT VENTRICULOGRAM Left 06/08/2021    Procedure: Left Heart Catheterization Without Left Ventriculogram;  Surgeon: Oly Woody MD;  Location: [facility] Cardiac Cath Lab;  Service: Cardiology     DILATION AND CURETTAGE       GYN SURGERY      hysterectomy     HYSTERECTOMY      Right ovary remains     JOINT REPLACEMENT       OTHER SURGICAL HISTORY      Hammertoe     PARATHYROIDECTOMY        PARATHYROIDECTOMY      Partial x1     RELEASE CARPAL TUNNEL Bilateral      UVULOPALATOPHARYNGOPLASTY       [facility] TOTAL KNEE ARTHROPLASTY Left 09/25/2018    Procedure: LEFT TOTAL KNEE ARTHROPLASTY;  Surgeon: Osito Harrell MD;  Location: [facility];  Service: Orthopedics     [facility] TOTAL KNEE ARTHROPLASTY Right 11/27/2018    Procedure: RIGHT TOTAL KNEE ARTHROPLASTY;  Surgeon: Osito Harrell MD;  Location: [facility];  Service: Orthopedics       Current Medications:  Patient's Medications   New Prescriptions    No medications on file   Previous Medications    ACETAMINOPHEN (TYLENOL) 500 MG TABLET    Take 2 tablets (1,000 mg) by mouth every 8 hours as needed for mild pain    AMLODIPINE (NORVASC) 5 MG TABLET    Take 5 mg by mouth every evening    AMOXICILLIN (AMOXIL) 250 MG CAPSULE    Take 250 mg by mouth 2 times daily PLEASE VERIFY DOSAGE    ARIPIPRAZOLE (ABILIFY) 2 MG TABLET    Take 2 mg by mouth every evening    ASPIRIN (ASA) 81 MG CHEWABLE TABLET    Take 81 mg by mouth daily LD 3/20    ATORVASTATIN (LIPITOR) 40 MG TABLET    Take 1 tablet by mouth At Bedtime    BUPROPION (WELLBUTRIN SR) 200 MG 12 HR TABLET    Take 200 mg by mouth daily    CALCIUM CARBONATE-VITAMIN D (CALTRATE) 600-10 MG-MCG PER TABLET    Take 1 tablet by mouth daily    EMPAGLIFLOZIN (JARDIANCE) 10 MG TABS TABLET    Take 10 mg by mouth daily LD 3/24    FERROUS SULFATE (FE TABS) 325 (65 FE) MG EC TABLET    Take 325 mg by mouth every evening    HYDROCORTISONE 2.5 % OINTMENT    Apply topically 2 times daily as needed    LORAZEPAM (ATIVAN) 1 MG TABLET    Take 1 mg by mouth every 6 hours as needed With dental work    LOSARTAN (COZAAR) 25 MG TABLET    Take 25 mg by mouth every evening    METOPROLOL SUCCINATE ER (TOPROL XL) 100 MG 24 HR TABLET    Take 100 mg by mouth daily    METRONIDAZOLE (METROCREAM) 0.75 % EXTERNAL CREAM    Apply topically 2 times daily    MULTIPLE VITAMINS-MINERALS (PRESERVISION/LUTEIN PO)    Take 1 tablet by mouth 2 times  daily LD 3/20    OMEPRAZOLE-SODIUM BICARBONATE (ZEGERID)  MG PER CAPSULE    Take 1 capsule by mouth every morning (before breakfast)    ORDER FOR DME    Equipment being ordered: Mandibular advancement device for treatment of moderate obstructive sleep apnea (AHI 16, PSG 7/16/2017 at [facility])    POLYETHYLENE GLYCOL (MIRALAX/GLYCOLAX) POWDER    Take 17 g by mouth nightly as needed    POLYETHYLENE GLYCOL 400 (BLINK TEARS OP)        SCOPOLAMINE (TRANSDERM) 1 MG/3DAYS 72 HR PATCH    Place 1 patch onto the skin every 72 hours For travel    SERTRALINE (ZOLOFT) 100 MG TABLET    Take 200 mg by mouth daily    SPIRONOLACTONE (ALDACTONE) 25 MG TABLET    Take 25 mg by mouth every evening    ZOLPIDEM (AMBIEN) 10 MG TABLET    Take 10 mg by mouth nightly as needed for sleep rare   Modified Medications    No medications on file   Discontinued Medications    ACETAMINOPHEN (TYLENOL) 500 MG TABLET    Take 500 mg by mouth as needed    AMLODIPINE (NORVASC) 5 MG TABLET    Take 5 mg by mouth daily    ARIPIPRAZOLE (ABILIFY) 2 MG TABLET    Take 1 tablet by mouth daily    ATORVASTATIN (LIPITOR) 40 MG TABLET    Take 40 mg by mouth every evening    CEFADROXIL (DURICEF) 500 MG CAPSULE    Take 1 capsule (500 mg) by mouth 2 times daily    HYDROXYZINE (ATARAX) 10 MG TABLET    Take 1 tablet (10 mg) by mouth every 6 hours as needed for other (adjuvant pain)    NIRMATRELVIR AND RITONAVIR (PAXLOVID) THERAPY PACK    Take 3 tablets by mouth 2 times daily    ONDANSETRON (ZOFRAN) 4 MG TABLET    Take by mouth every 6 hours as needed for nausea    OXYCODONE (ROXICODONE) 5 MG TABLET    Take 1 tablet (5 mg) by mouth every 4 hours as needed for moderate to severe pain Max 6 tabs per day.    SENNA-DOCUSATE (SENOKOT-S/PERICOLACE) 8.6-50 MG TABLET    Take 1 tablet by mouth 2 times daily    TERAZOSIN (HYTRIN) 2 MG CAPSULE    Take 2 mg by mouth At Bedtime    TRIAMCINOLONE (KENALOG) 0.1 % CREAM    2 times daily as needed Do not use on face, axilla or  genital area       ALLERGIES:  Allergies   Allergen Reactions     Adhesive Tape      Animal Dander      Morphine Nausea     Seasonal Allergies      Latex Rash     sensitivity       Social History  Social History     Tobacco Use     Smoking status: Never     Smokeless tobacco: Never   Substance Use Topics     Alcohol use: No     Drug use: No       Any Abnormal Recent Diagnostics? No    Discharge Planning:   Discharge plan according to [facility]:      Home POD 1 with assist of Spouse    02/15/23 1321   Discharge Planning   Patient/Family Anticipates Transition to home with family   Living Arrangements   People in Home spouse   Type of Residence Private Residence   Is your private residence a single family home or apartment? Single family home   Number of Stairs, Within Home, Primary ten   Stair Railings, Within Home, Primary railings safe and in good condition   Bathroom Shower/Tub Walk-in shower   Equipment Currently Used at Home raised toilet seat   Support System   Support Systems Spouse/Significant Other   Do you have someone available to stay with you one or two nights once you are home? Yes       WINTER Henao, CNP   Advanced Practice Nurse Navigator- Orthopedics  [facility]   Phone: [phone]

## 2023-03-28 ENCOUNTER — ANESTHESIA (OUTPATIENT)
Dept: SURGERY | Facility: CLINIC | Age: 73
DRG: 467 | End: 2023-03-28
Payer: MEDICARE

## 2023-03-28 ENCOUNTER — APPOINTMENT (OUTPATIENT)
Dept: PHYSICAL THERAPY | Facility: CLINIC | Age: 73
DRG: 467 | End: 2023-03-28
Attending: ORTHOPAEDIC SURGERY
Payer: MEDICARE

## 2023-03-28 ENCOUNTER — HOSPITAL ENCOUNTER (INPATIENT)
Facility: CLINIC | Age: 73
LOS: 1 days | Discharge: HOME | DRG: 467 | End: 2023-03-29
Attending: ORTHOPAEDIC SURGERY | Admitting: ORTHOPAEDIC SURGERY
Payer: MEDICARE

## 2023-03-28 ENCOUNTER — APPOINTMENT (OUTPATIENT)
Dept: RADIOLOGY | Facility: CLINIC | Age: 73
DRG: 467 | End: 2023-03-28
Attending: ORTHOPAEDIC SURGERY
Payer: MEDICARE

## 2023-03-28 DIAGNOSIS — I10 ESSENTIAL HYPERTENSION: ICD-10-CM

## 2023-03-28 DIAGNOSIS — Z96.60 STATUS POST JOINT REPLACEMENT: Primary | ICD-10-CM

## 2023-03-28 LAB — GLUCOSE BLDC GLUCOMTR-MCNC: 106 MG/DL (ref 70–99)

## 2023-03-28 PROCEDURE — 272N000001 HC OR GENERAL SUPPLY STERILE: Performed by: ORTHOPAEDIC SURGERY

## 2023-03-28 PROCEDURE — 250N000013 HC RX MED GY IP 250 OP 250 PS 637: Performed by: INTERNAL MEDICINE

## 2023-03-28 PROCEDURE — 250N000011 HC RX IP 250 OP 636: Performed by: PHYSICIAN ASSISTANT

## 2023-03-28 PROCEDURE — 97110 THERAPEUTIC EXERCISES: CPT | Mod: GP

## 2023-03-28 PROCEDURE — 250N000009 HC RX 250: Performed by: PHYSICIAN ASSISTANT

## 2023-03-28 PROCEDURE — 250N000009 HC RX 250: Performed by: NURSE ANESTHETIST, CERTIFIED REGISTERED

## 2023-03-28 PROCEDURE — 99222 1ST HOSP IP/OBS MODERATE 55: CPT | Performed by: INTERNAL MEDICINE

## 2023-03-28 PROCEDURE — 250N000013 HC RX MED GY IP 250 OP 250 PS 637: Performed by: ORTHOPAEDIC SURGERY

## 2023-03-28 PROCEDURE — 250N000011 HC RX IP 250 OP 636: Performed by: ANESTHESIOLOGY

## 2023-03-28 PROCEDURE — 999N000141 HC STATISTIC PRE-PROCEDURE NURSING ASSESSMENT: Performed by: ORTHOPAEDIC SURGERY

## 2023-03-28 PROCEDURE — 258N000003 HC RX IP 258 OP 636: Performed by: ORTHOPAEDIC SURGERY

## 2023-03-28 PROCEDURE — 0SRW0J9 REPLACEMENT OF LEFT KNEE JOINT, TIBIAL SURFACE WITH SYNTHETIC SUBSTITUTE, CEMENTED, OPEN APPROACH: ICD-10-PCS | Performed by: ORTHOPAEDIC SURGERY

## 2023-03-28 PROCEDURE — 250N000009 HC RX 250: Performed by: ANESTHESIOLOGY

## 2023-03-28 PROCEDURE — 258N000003 HC RX IP 258 OP 636: Performed by: ANESTHESIOLOGY

## 2023-03-28 PROCEDURE — C1776 JOINT DEVICE (IMPLANTABLE): HCPCS | Performed by: ORTHOPAEDIC SURGERY

## 2023-03-28 PROCEDURE — C1713 ANCHOR/SCREW BN/BN,TIS/BN: HCPCS | Performed by: ORTHOPAEDIC SURGERY

## 2023-03-28 PROCEDURE — 120N000001 HC R&B MED SURG/OB

## 2023-03-28 PROCEDURE — 0SRU0J9 REPLACEMENT OF LEFT KNEE JOINT, FEMORAL SURFACE WITH SYNTHETIC SUBSTITUTE, CEMENTED, OPEN APPROACH: ICD-10-PCS | Performed by: ORTHOPAEDIC SURGERY

## 2023-03-28 PROCEDURE — 250N000011 HC RX IP 250 OP 636: Performed by: NURSE ANESTHETIST, CERTIFIED REGISTERED

## 2023-03-28 PROCEDURE — 999N000127 HC STATISTIC PERIPHERAL IV START W US GUIDANCE

## 2023-03-28 PROCEDURE — 710N000010 HC RECOVERY PHASE 1, LEVEL 2, PER MIN: Performed by: ORTHOPAEDIC SURGERY

## 2023-03-28 PROCEDURE — 97116 GAIT TRAINING THERAPY: CPT | Mod: GP

## 2023-03-28 PROCEDURE — 370N000017 HC ANESTHESIA TECHNICAL FEE, PER MIN: Performed by: ORTHOPAEDIC SURGERY

## 2023-03-28 PROCEDURE — 97161 PT EVAL LOW COMPLEX 20 MIN: CPT | Mod: GP

## 2023-03-28 PROCEDURE — 258N000001 HC RX 258: Performed by: ORTHOPAEDIC SURGERY

## 2023-03-28 PROCEDURE — 250N000011 HC RX IP 250 OP 636: Performed by: ORTHOPAEDIC SURGERY

## 2023-03-28 PROCEDURE — 0SPW0JZ REMOVAL OF SYNTHETIC SUBSTITUTE FROM LEFT KNEE JOINT, TIBIAL SURFACE, OPEN APPROACH: ICD-10-PCS | Performed by: ORTHOPAEDIC SURGERY

## 2023-03-28 PROCEDURE — 360N000078 HC SURGERY LEVEL 5, PER MIN: Performed by: ORTHOPAEDIC SURGERY

## 2023-03-28 PROCEDURE — 0SPU0JZ REMOVAL OF SYNTHETIC SUBSTITUTE FROM LEFT KNEE JOINT, FEMORAL SURFACE, OPEN APPROACH: ICD-10-PCS | Performed by: ORTHOPAEDIC SURGERY

## 2023-03-28 PROCEDURE — 250N000013 HC RX MED GY IP 250 OP 250 PS 637: Performed by: PHYSICIAN ASSISTANT

## 2023-03-28 PROCEDURE — 999N000065 XR KNEE PORT LEFT 1/2 VIEWS: Mod: LT

## 2023-03-28 PROCEDURE — 258N000003 HC RX IP 258 OP 636: Performed by: NURSE ANESTHETIST, CERTIFIED REGISTERED

## 2023-03-28 PROCEDURE — 250N000009 HC RX 250: Performed by: ORTHOPAEDIC SURGERY

## 2023-03-28 DEVICE — ATTUNE KNEE SYSTEM REVISION CRS ROTATING PLATFORM INSERT AOX 6MM SIZE 5
Type: IMPLANTABLE DEVICE | Site: KNEE | Status: FUNCTIONAL
Brand: ATTUNE

## 2023-03-28 DEVICE — ATTUNE KNEE SYSTEM REVISION FEMORAL SLEEVE POROCOAT FULLY COATED 30MM
Type: IMPLANTABLE DEVICE | Site: KNEE | Status: FUNCTIONAL
Brand: ATTUNE

## 2023-03-28 DEVICE — ATTUNE KNEE SYSTEM REVISION CRS FEMORAL CEMENTED LEFT SIZE 5
Type: IMPLANTABLE DEVICE | Site: KNEE | Status: FUNCTIONAL
Brand: ATTUNE

## 2023-03-28 DEVICE — BONE CEMENT SIMPLEX W/TOBRAMYCIN 6197-9-001: Type: IMPLANTABLE DEVICE | Site: KNEE | Status: FUNCTIONAL

## 2023-03-28 DEVICE — ATTUNE KNEE SYSTEM REVISION ROTATING PLATFORM TIBIAL BASE CEMENTED SIZE 5
Type: IMPLANTABLE DEVICE | Site: KNEE | Status: FUNCTIONAL
Brand: ATTUNE

## 2023-03-28 DEVICE — ATTUNE KNEE SYSTEM REVISION PRESSFIT STEM 16X60MM
Type: IMPLANTABLE DEVICE | Site: KNEE | Status: FUNCTIONAL
Brand: ATTUNE

## 2023-03-28 DEVICE — ATTUNE KNEE SYSTEM REVISION PRESSFIT STEM 14X110MM
Type: IMPLANTABLE DEVICE | Site: KNEE | Status: FUNCTIONAL
Brand: ATTUNE

## 2023-03-28 DEVICE — ATTUNE KNEE SYSTEM REVISION TIBIAL SLEEVE POROCOAT FULLY COATED 37MM
Type: IMPLANTABLE DEVICE | Site: KNEE | Status: FUNCTIONAL
Brand: ATTUNE

## 2023-03-28 RX ORDER — CEFAZOLIN SODIUM/WATER 2 G/20 ML
2 SYRINGE (ML) INTRAVENOUS
Status: COMPLETED | OUTPATIENT
Start: 2023-03-28 | End: 2023-03-28

## 2023-03-28 RX ORDER — AMLODIPINE BESYLATE 5 MG/1
5 TABLET ORAL EVERY EVENING
Status: DISCONTINUED | OUTPATIENT
Start: 2023-03-29 | End: 2023-03-29 | Stop reason: HOSPADM

## 2023-03-28 RX ORDER — BUPIVACAINE HYDROCHLORIDE 7.5 MG/ML
INJECTION, SOLUTION INTRASPINAL
Status: COMPLETED | OUTPATIENT
Start: 2023-03-28 | End: 2023-03-28

## 2023-03-28 RX ORDER — ONDANSETRON 4 MG/1
4 TABLET, ORALLY DISINTEGRATING ORAL EVERY 30 MIN PRN
Status: DISCONTINUED | OUTPATIENT
Start: 2023-03-28 | End: 2023-03-28 | Stop reason: HOSPADM

## 2023-03-28 RX ORDER — ACETAMINOPHEN 325 MG/1
975 TABLET ORAL EVERY 8 HOURS
Status: DISCONTINUED | OUTPATIENT
Start: 2023-03-28 | End: 2023-03-29 | Stop reason: HOSPADM

## 2023-03-28 RX ORDER — HYDROMORPHONE HCL IN WATER/PF 6 MG/30 ML
0.2 PATIENT CONTROLLED ANALGESIA SYRINGE INTRAVENOUS EVERY 5 MIN PRN
Status: DISCONTINUED | OUTPATIENT
Start: 2023-03-28 | End: 2023-03-28 | Stop reason: HOSPADM

## 2023-03-28 RX ORDER — FERROUS SULFATE 325(65) MG
325 TABLET ORAL EVERY EVENING
Status: DISCONTINUED | OUTPATIENT
Start: 2023-03-28 | End: 2023-03-29 | Stop reason: HOSPADM

## 2023-03-28 RX ORDER — SODIUM CHLORIDE, SODIUM LACTATE, POTASSIUM CHLORIDE, CALCIUM CHLORIDE 600; 310; 30; 20 MG/100ML; MG/100ML; MG/100ML; MG/100ML
INJECTION, SOLUTION INTRAVENOUS CONTINUOUS
Status: DISCONTINUED | OUTPATIENT
Start: 2023-03-28 | End: 2023-03-29 | Stop reason: HOSPADM

## 2023-03-28 RX ORDER — CEFADROXIL 500 MG/1
500 CAPSULE ORAL 2 TIMES DAILY
Qty: 28 CAPSULE | Refills: 0 | OUTPATIENT
Start: 2023-03-28 | End: 2024-08-08

## 2023-03-28 RX ORDER — ONDANSETRON 2 MG/ML
4 INJECTION INTRAMUSCULAR; INTRAVENOUS EVERY 6 HOURS PRN
Status: DISCONTINUED | OUTPATIENT
Start: 2023-03-28 | End: 2023-03-29 | Stop reason: HOSPADM

## 2023-03-28 RX ORDER — OXYCODONE HYDROCHLORIDE 5 MG/1
5 TABLET ORAL EVERY 4 HOURS PRN
Status: DISCONTINUED | OUTPATIENT
Start: 2023-03-28 | End: 2023-03-29 | Stop reason: HOSPADM

## 2023-03-28 RX ORDER — NALOXONE HYDROCHLORIDE 0.4 MG/ML
0.2 INJECTION, SOLUTION INTRAMUSCULAR; INTRAVENOUS; SUBCUTANEOUS
Status: DISCONTINUED | OUTPATIENT
Start: 2023-03-28 | End: 2023-03-29 | Stop reason: HOSPADM

## 2023-03-28 RX ORDER — SODIUM CHLORIDE, SODIUM LACTATE, POTASSIUM CHLORIDE, CALCIUM CHLORIDE 600; 310; 30; 20 MG/100ML; MG/100ML; MG/100ML; MG/100ML
INJECTION, SOLUTION INTRAVENOUS CONTINUOUS
Status: DISCONTINUED | OUTPATIENT
Start: 2023-03-28 | End: 2023-03-28 | Stop reason: HOSPADM

## 2023-03-28 RX ORDER — LOSARTAN POTASSIUM 25 MG/1
25 TABLET ORAL EVERY EVENING
Status: DISCONTINUED | OUTPATIENT
Start: 2023-03-29 | End: 2023-03-29 | Stop reason: HOSPADM

## 2023-03-28 RX ORDER — METOPROLOL SUCCINATE 100 MG/1
100 TABLET, EXTENDED RELEASE ORAL DAILY
Status: DISCONTINUED | OUTPATIENT
Start: 2023-03-29 | End: 2023-03-29 | Stop reason: HOSPADM

## 2023-03-28 RX ORDER — NALOXONE HYDROCHLORIDE 0.4 MG/ML
0.4 INJECTION, SOLUTION INTRAMUSCULAR; INTRAVENOUS; SUBCUTANEOUS
Status: DISCONTINUED | OUTPATIENT
Start: 2023-03-28 | End: 2023-03-29 | Stop reason: HOSPADM

## 2023-03-28 RX ORDER — ERGOCALCIFEROL (VITAMIN D2) 10 MCG
800 TABLET ORAL 2 TIMES DAILY
COMMUNITY

## 2023-03-28 RX ORDER — PANTOPRAZOLE SODIUM 40 MG/1
40 TABLET, DELAYED RELEASE ORAL
Status: DISCONTINUED | OUTPATIENT
Start: 2023-03-29 | End: 2023-03-29 | Stop reason: HOSPADM

## 2023-03-28 RX ORDER — ARIPIPRAZOLE 2 MG/1
2 TABLET ORAL EVERY EVENING
Status: DISCONTINUED | OUTPATIENT
Start: 2023-03-28 | End: 2023-03-29 | Stop reason: HOSPADM

## 2023-03-28 RX ORDER — FENTANYL CITRATE 50 UG/ML
100 INJECTION, SOLUTION INTRAMUSCULAR; INTRAVENOUS ONCE
Status: COMPLETED | OUTPATIENT
Start: 2023-03-28 | End: 2023-03-28

## 2023-03-28 RX ORDER — LIDOCAINE 40 MG/G
CREAM TOPICAL
Status: DISCONTINUED | OUTPATIENT
Start: 2023-03-28 | End: 2023-03-29 | Stop reason: HOSPADM

## 2023-03-28 RX ORDER — HYDROMORPHONE HCL IN WATER/PF 6 MG/30 ML
0.2 PATIENT CONTROLLED ANALGESIA SYRINGE INTRAVENOUS
Status: DISCONTINUED | OUTPATIENT
Start: 2023-03-28 | End: 2023-03-29 | Stop reason: HOSPADM

## 2023-03-28 RX ORDER — ONDANSETRON 4 MG/1
4 TABLET, ORALLY DISINTEGRATING ORAL EVERY 6 HOURS PRN
Status: DISCONTINUED | OUTPATIENT
Start: 2023-03-28 | End: 2023-03-29 | Stop reason: HOSPADM

## 2023-03-28 RX ORDER — HYDROMORPHONE HCL IN WATER/PF 6 MG/30 ML
0.4 PATIENT CONTROLLED ANALGESIA SYRINGE INTRAVENOUS
Status: DISCONTINUED | OUTPATIENT
Start: 2023-03-28 | End: 2023-03-29 | Stop reason: HOSPADM

## 2023-03-28 RX ORDER — ATORVASTATIN CALCIUM 40 MG/1
40 TABLET, FILM COATED ORAL AT BEDTIME
Status: DISCONTINUED | OUTPATIENT
Start: 2023-03-28 | End: 2023-03-29 | Stop reason: HOSPADM

## 2023-03-28 RX ORDER — LIDOCAINE 40 MG/G
CREAM TOPICAL
Status: DISCONTINUED | OUTPATIENT
Start: 2023-03-28 | End: 2023-03-28 | Stop reason: HOSPADM

## 2023-03-28 RX ORDER — PROCHLORPERAZINE MALEATE 5 MG
5 TABLET ORAL EVERY 6 HOURS PRN
Status: DISCONTINUED | OUTPATIENT
Start: 2023-03-28 | End: 2023-03-29 | Stop reason: HOSPADM

## 2023-03-28 RX ORDER — AMOXICILLIN 250 MG
1 CAPSULE ORAL 2 TIMES DAILY
Status: DISCONTINUED | OUTPATIENT
Start: 2023-03-28 | End: 2023-03-29 | Stop reason: HOSPADM

## 2023-03-28 RX ORDER — FENTANYL CITRATE 50 UG/ML
50 INJECTION, SOLUTION INTRAMUSCULAR; INTRAVENOUS EVERY 5 MIN PRN
Status: DISCONTINUED | OUTPATIENT
Start: 2023-03-28 | End: 2023-03-28 | Stop reason: HOSPADM

## 2023-03-28 RX ORDER — ONDANSETRON 2 MG/ML
INJECTION INTRAMUSCULAR; INTRAVENOUS PRN
Status: DISCONTINUED | OUTPATIENT
Start: 2023-03-28 | End: 2023-03-28

## 2023-03-28 RX ORDER — TRANEXAMIC ACID 650 MG/1
1950 TABLET ORAL ONCE
Status: COMPLETED | OUTPATIENT
Start: 2023-03-28 | End: 2023-03-28

## 2023-03-28 RX ORDER — VANCOMYCIN HYDROCHLORIDE 1 G/20ML
INJECTION, POWDER, LYOPHILIZED, FOR SOLUTION INTRAVENOUS PRN
Status: DISCONTINUED | OUTPATIENT
Start: 2023-03-28 | End: 2023-03-28 | Stop reason: HOSPADM

## 2023-03-28 RX ORDER — POLYETHYLENE GLYCOL 3350 17 G/17G
17 POWDER, FOR SOLUTION ORAL DAILY
Status: DISCONTINUED | OUTPATIENT
Start: 2023-03-28 | End: 2023-03-29 | Stop reason: HOSPADM

## 2023-03-28 RX ORDER — OXYCODONE HYDROCHLORIDE 5 MG/1
10 TABLET ORAL EVERY 4 HOURS PRN
Status: DISCONTINUED | OUTPATIENT
Start: 2023-03-28 | End: 2023-03-29 | Stop reason: HOSPADM

## 2023-03-28 RX ORDER — SERTRALINE HYDROCHLORIDE 100 MG/1
200 TABLET, FILM COATED ORAL DAILY
Status: DISCONTINUED | OUTPATIENT
Start: 2023-03-29 | End: 2023-03-29 | Stop reason: HOSPADM

## 2023-03-28 RX ORDER — ONDANSETRON 2 MG/ML
4 INJECTION INTRAMUSCULAR; INTRAVENOUS EVERY 30 MIN PRN
Status: DISCONTINUED | OUTPATIENT
Start: 2023-03-28 | End: 2023-03-28 | Stop reason: HOSPADM

## 2023-03-28 RX ORDER — FENTANYL CITRATE 50 UG/ML
25 INJECTION, SOLUTION INTRAMUSCULAR; INTRAVENOUS EVERY 5 MIN PRN
Status: DISCONTINUED | OUTPATIENT
Start: 2023-03-28 | End: 2023-03-28 | Stop reason: HOSPADM

## 2023-03-28 RX ORDER — PHENYLEPHRINE HCL IN 0.9% NACL 50MG/250ML
PLASTIC BAG, INJECTION (ML) INTRAVENOUS CONTINUOUS PRN
Status: DISCONTINUED | OUTPATIENT
Start: 2023-03-28 | End: 2023-03-28

## 2023-03-28 RX ORDER — IBUPROFEN 200 MG
1 CAPSULE ORAL 2 TIMES DAILY
COMMUNITY

## 2023-03-28 RX ORDER — SPIRONOLACTONE 25 MG/1
25 TABLET ORAL EVERY EVENING
Status: DISCONTINUED | OUTPATIENT
Start: 2023-03-29 | End: 2023-03-29 | Stop reason: HOSPADM

## 2023-03-28 RX ORDER — BISACODYL 10 MG
10 SUPPOSITORY, RECTAL RECTAL DAILY PRN
Status: DISCONTINUED | OUTPATIENT
Start: 2023-03-28 | End: 2023-03-29 | Stop reason: HOSPADM

## 2023-03-28 RX ORDER — BUPROPION HYDROCHLORIDE 100 MG/1
200 TABLET, EXTENDED RELEASE ORAL DAILY
Status: DISCONTINUED | OUTPATIENT
Start: 2023-03-28 | End: 2023-03-29 | Stop reason: HOSPADM

## 2023-03-28 RX ORDER — KETOROLAC TROMETHAMINE 15 MG/ML
15 INJECTION, SOLUTION INTRAMUSCULAR; INTRAVENOUS EVERY 6 HOURS
Status: COMPLETED | OUTPATIENT
Start: 2023-03-28 | End: 2023-03-29

## 2023-03-28 RX ORDER — VANCOMYCIN HYDROCHLORIDE 1 G/20ML
INJECTION, POWDER, LYOPHILIZED, FOR SOLUTION INTRAVENOUS
Status: DISCONTINUED
Start: 2023-03-28 | End: 2023-03-28 | Stop reason: HOSPADM

## 2023-03-28 RX ORDER — METHOCARBAMOL 500 MG/1
500 TABLET, FILM COATED ORAL EVERY 6 HOURS PRN
Status: DISCONTINUED | OUTPATIENT
Start: 2023-03-28 | End: 2023-03-29 | Stop reason: HOSPADM

## 2023-03-28 RX ORDER — ACETAMINOPHEN 325 MG/1
650 TABLET ORAL EVERY 4 HOURS PRN
Status: DISCONTINUED | OUTPATIENT
Start: 2023-03-31 | End: 2023-03-29 | Stop reason: HOSPADM

## 2023-03-28 RX ORDER — ASPIRIN 81 MG/1
81 TABLET ORAL 2 TIMES DAILY
Status: DISCONTINUED | OUTPATIENT
Start: 2023-03-28 | End: 2023-03-29 | Stop reason: HOSPADM

## 2023-03-28 RX ORDER — HYDROXYZINE HYDROCHLORIDE 10 MG/1
10 TABLET, FILM COATED ORAL EVERY 6 HOURS PRN
Status: DISCONTINUED | OUTPATIENT
Start: 2023-03-28 | End: 2023-03-29 | Stop reason: HOSPADM

## 2023-03-28 RX ORDER — PROPOFOL 10 MG/ML
INJECTION, EMULSION INTRAVENOUS CONTINUOUS PRN
Status: DISCONTINUED | OUTPATIENT
Start: 2023-03-28 | End: 2023-03-28

## 2023-03-28 RX ORDER — CEFAZOLIN SODIUM/WATER 2 G/20 ML
2 SYRINGE (ML) INTRAVENOUS SEE ADMIN INSTRUCTIONS
Status: DISCONTINUED | OUTPATIENT
Start: 2023-03-28 | End: 2023-03-28 | Stop reason: HOSPADM

## 2023-03-28 RX ORDER — BUPIVACAINE HYDROCHLORIDE 5 MG/ML
INJECTION, SOLUTION EPIDURAL; INTRACAUDAL
Status: COMPLETED | OUTPATIENT
Start: 2023-03-28 | End: 2023-03-28

## 2023-03-28 RX ORDER — POLYETHYLENE GLYCOL 3350 17 G/17G
17 POWDER, FOR SOLUTION ORAL DAILY
Status: DISCONTINUED | OUTPATIENT
Start: 2023-03-29 | End: 2023-03-29 | Stop reason: HOSPADM

## 2023-03-28 RX ORDER — MAGNESIUM HYDROXIDE 1200 MG/15ML
LIQUID ORAL PRN
Status: DISCONTINUED | OUTPATIENT
Start: 2023-03-28 | End: 2023-03-28 | Stop reason: HOSPADM

## 2023-03-28 RX ORDER — CEFAZOLIN SODIUM 2 G/100ML
2 INJECTION, SOLUTION INTRAVENOUS EVERY 8 HOURS
Status: COMPLETED | OUTPATIENT
Start: 2023-03-28 | End: 2023-03-28

## 2023-03-28 RX ORDER — HALOPERIDOL 5 MG/ML
1 INJECTION INTRAMUSCULAR
Status: DISCONTINUED | OUTPATIENT
Start: 2023-03-28 | End: 2023-03-28 | Stop reason: HOSPADM

## 2023-03-28 RX ORDER — HYDROMORPHONE HCL IN WATER/PF 6 MG/30 ML
0.4 PATIENT CONTROLLED ANALGESIA SYRINGE INTRAVENOUS EVERY 5 MIN PRN
Status: DISCONTINUED | OUTPATIENT
Start: 2023-03-28 | End: 2023-03-28 | Stop reason: HOSPADM

## 2023-03-28 RX ADMIN — SENNOSIDES AND DOCUSATE SODIUM 1 TABLET: 50; 8.6 TABLET ORAL at 20:43

## 2023-03-28 RX ADMIN — SODIUM CHLORIDE, POTASSIUM CHLORIDE, SODIUM LACTATE AND CALCIUM CHLORIDE: 600; 310; 30; 20 INJECTION, SOLUTION INTRAVENOUS at 12:32

## 2023-03-28 RX ADMIN — ACETAMINOPHEN 975 MG: 325 TABLET ORAL at 20:42

## 2023-03-28 RX ADMIN — ARIPIPRAZOLE 2 MG: 2 TABLET ORAL at 22:09

## 2023-03-28 RX ADMIN — MIDAZOLAM HYDROCHLORIDE 2 MG: 1 INJECTION, SOLUTION INTRAMUSCULAR; INTRAVENOUS at 06:57

## 2023-03-28 RX ADMIN — OXYCODONE HYDROCHLORIDE 5 MG: 5 TABLET ORAL at 16:25

## 2023-03-28 RX ADMIN — Medication 2 G: at 07:13

## 2023-03-28 RX ADMIN — ASPIRIN 81 MG: 81 TABLET, COATED ORAL at 12:39

## 2023-03-28 RX ADMIN — FERROUS SULFATE TAB 325 MG (65 MG ELEMENTAL FE) 325 MG: 325 (65 FE) TAB at 20:43

## 2023-03-28 RX ADMIN — PROPOFOL 200 MCG/KG/MIN: 10 INJECTION, EMULSION INTRAVENOUS at 07:26

## 2023-03-28 RX ADMIN — POLYETHYLENE GLYCOL AND PROPYLENE GLYCOL 2 DROP: 4; 3 SOLUTION/ DROPS OPHTHALMIC at 14:24

## 2023-03-28 RX ADMIN — CEFAZOLIN SODIUM 2 G: 2 INJECTION, SOLUTION INTRAVENOUS at 14:24

## 2023-03-28 RX ADMIN — HYDROXYZINE HYDROCHLORIDE 10 MG: 10 TABLET ORAL at 11:59

## 2023-03-28 RX ADMIN — PHENYLEPHRINE HYDROCHLORIDE 200 MCG: 10 INJECTION INTRAVENOUS at 07:32

## 2023-03-28 RX ADMIN — OXYCODONE HYDROCHLORIDE 5 MG: 5 TABLET ORAL at 22:09

## 2023-03-28 RX ADMIN — HYDROXYZINE HYDROCHLORIDE 10 MG: 10 TABLET ORAL at 22:55

## 2023-03-28 RX ADMIN — METHOCARBAMOL TABLETS 500 MG: 500 TABLET, COATED ORAL at 12:39

## 2023-03-28 RX ADMIN — BUPIVACAINE HYDROCHLORIDE 20 ML: 5 INJECTION, SOLUTION EPIDURAL; INTRACAUDAL; PERINEURAL at 06:56

## 2023-03-28 RX ADMIN — TRANEXAMIC ACID 1950 MG: 650 TABLET ORAL at 05:54

## 2023-03-28 RX ADMIN — BUPIVACAINE HYDROCHLORIDE IN DEXTROSE 1.6 ML: 7.5 INJECTION, SOLUTION SUBARACHNOID at 07:16

## 2023-03-28 RX ADMIN — Medication 0.2 MCG/KG/MIN: at 07:32

## 2023-03-28 RX ADMIN — SENNOSIDES AND DOCUSATE SODIUM 1 TABLET: 50; 8.6 TABLET ORAL at 12:39

## 2023-03-28 RX ADMIN — ONDANSETRON 4 MG: 2 INJECTION INTRAMUSCULAR; INTRAVENOUS at 09:31

## 2023-03-28 RX ADMIN — POLYETHYLENE GLYCOL 3350 17 G: 17 POWDER, FOR SOLUTION ORAL at 14:23

## 2023-03-28 RX ADMIN — ACETAMINOPHEN 975 MG: 325 TABLET ORAL at 12:39

## 2023-03-28 RX ADMIN — KETOROLAC TROMETHAMINE 15 MG: 15 INJECTION, SOLUTION INTRAMUSCULAR; INTRAVENOUS at 17:58

## 2023-03-28 RX ADMIN — SODIUM CHLORIDE, POTASSIUM CHLORIDE, SODIUM LACTATE AND CALCIUM CHLORIDE: 600; 310; 30; 20 INJECTION, SOLUTION INTRAVENOUS at 09:49

## 2023-03-28 RX ADMIN — HYDROMORPHONE HYDROCHLORIDE 0.4 MG: 0.2 INJECTION, SOLUTION INTRAMUSCULAR; INTRAVENOUS; SUBCUTANEOUS at 12:38

## 2023-03-28 RX ADMIN — ASPIRIN 81 MG: 81 TABLET, COATED ORAL at 20:43

## 2023-03-28 RX ADMIN — SODIUM CHLORIDE, POTASSIUM CHLORIDE, SODIUM LACTATE AND CALCIUM CHLORIDE: 600; 310; 30; 20 INJECTION, SOLUTION INTRAVENOUS at 07:13

## 2023-03-28 RX ADMIN — ATORVASTATIN CALCIUM 40 MG: 40 TABLET, FILM COATED ORAL at 20:43

## 2023-03-28 RX ADMIN — POLYETHYLENE GLYCOL AND PROPYLENE GLYCOL 1 DROP: 4; 3 SOLUTION/ DROPS OPHTHALMIC at 20:42

## 2023-03-28 RX ADMIN — CEFAZOLIN SODIUM 2 G: 2 INJECTION, SOLUTION INTRAVENOUS at 22:10

## 2023-03-28 RX ADMIN — FENTANYL CITRATE 100 MCG: 50 INJECTION INTRAMUSCULAR; INTRAVENOUS at 06:57

## 2023-03-28 RX ADMIN — OXYCODONE HYDROCHLORIDE 5 MG: 5 TABLET ORAL at 11:57

## 2023-03-28 ASSESSMENT — ACTIVITIES OF DAILY LIVING (ADL)
ADLS_ACUITY_SCORE: 20
ADLS_ACUITY_SCORE: 21
ADLS_ACUITY_SCORE: 20
ADLS_ACUITY_SCORE: 20
ADLS_ACUITY_SCORE: 21
ADLS_ACUITY_SCORE: 21

## 2023-03-28 ASSESSMENT — ENCOUNTER SYMPTOMS
DYSRHYTHMIAS: 1
SEIZURES: 0

## 2023-03-28 NOTE — PLAN OF CARE
Problem: Knee Arthroplasty  Goal: Optimal Coping  Outcome: Progressing  Goal: Absence of Bleeding  Outcome: Progressing  Goal: Fluid and Electrolyte Balance  Outcome: Progressing  Goal: Optimal Functional Ability  Outcome: Progressing  Intervention: Promote Optimal Functional Status  Recent Flowsheet Documentation  Taken 3/28/2023 1432 by Brenda Schuster RN  Activity Management: up in chair  Taken 3/28/2023 1310 by Brenda Schuster RN  Assistive Device Utilized:   gait belt   walker  Activity Management: up in chair  Taken 3/28/2023 1307 by Brenda Schuster RN  Assistive Device Utilized:   gait belt   walker  Activity Management:   activity adjusted per tolerance   ambulated to bathroom  Taken 3/28/2023 1215 by Brenda Schuster RN  Activity Management: activity adjusted per tolerance  Goal: Absence of Infection Signs and Symptoms  Outcome: Progressing  Goal: Intact Neurovascular Status  Outcome: Progressing  Goal: Anesthesia/Sedation Recovery  Outcome: Progressing  Intervention: Optimize Anesthesia Recovery  Recent Flowsheet Documentation  Taken 3/28/2023 1215 by Brenda Schuster RN  Safety Promotion/Fall Prevention:   activity supervised   bed alarm on   clutter free environment maintained   fall prevention program maintained   increased rounding and observation   increase visualization of patient   lighting adjusted   mobility aid in reach   nonskid shoes/slippers when out of bed   patient and family education   room door open   room near nurse's station   room organization consistent   safety round/check completed   supervised activity   treat reversible contributory factors   treat underlying cause  Administration (IS):   instruction provided, initial   mouthpiece utilized   proper technique demonstrated   self-administered  Reorientation Measures: clock in view  Level Incentive Spirometer (mL): 2000  Incentive Spirometer Predicted Level (mL): 2100  Number of Repetitions (IS): 3  Patient Tolerance (IS):  good  Goal: Acceptable Pain Control  Outcome: Progressing  Intervention: Prevent or Manage Pain  Recent Flowsheet Documentation  Taken 3/28/2023 1324 by Brenda Schuster RN  Pain Management Interventions: rest  Taken 3/28/2023 1253 by Brenda Schuster RN  Pain Management Interventions: distraction  Taken 3/28/2023 1239 by Brenda Schuster RN  Pain Management Interventions: medication (see MAR)  Taken 3/28/2023 1215 by Brenda Schuster RN  Pain Management Interventions: cold applied  Goal: Nausea and Vomiting Relief  Outcome: Progressing  Goal: Effective Urinary Elimination  Outcome: Progressing  Goal: Effective Oxygenation and Ventilation  Outcome: Progressing  Intervention: Optimize Oxygenation and Ventilation  Recent Flowsheet Documentation  Taken 3/28/2023 1307 by Brenda Schuster RN  Head of Bed (HOB) Positioning: (Eating) HOB at 30-45 degrees  Taken 3/28/2023 1215 by Brenda Schuster RN  Administration (IS):   instruction provided, initial   mouthpiece utilized   proper technique demonstrated   self-administered  Level Incentive Spirometer (mL): 2000  Incentive Spirometer Predicted Level (mL): 2100  Number of Repetitions (IS): 3  Patient Tolerance (IS): good  Head of Bed (HOB) Positioning: HOB at 30-45 degrees     Patient vital signs are at baseline: Yes  Patient able to ambulate as they were prior to admission or with assist devices provided by therapies during their stay:  Yes  Patient MUST void prior to discharge:  Yes  Patient able to tolerate oral intake:  Yes  Pain has adequate pain control using Oral analgesics:  Yes  Does patient have an identified :  No,  Reason:  Utilizing IV Dilaudid  Has goal D/C date and time been discussed with patient:  Yes     Pt is A & O x 4 & endorses mild to moderate pain to the L. Knee during today's shift. Utilizing scheduled APAP, PRN IV Dilaudid & PRN Robaxin  w/ effective results. Ace wrap dressing to surgical incision is CDI. CMS intact x 4. Voiding  spontaneously. Up w/ an assist of 1, walker & gait belt. LR running at 75 ml. Tolerating a regular diet. VSS. Anticipating discharge tomorrow pending completion of clinical milestones.      TRISTAN Allen  Shift: [id number] - [id number]

## 2023-03-28 NOTE — ANESTHESIA PROCEDURE NOTES
"Intrathecal injection Procedure Note    Pre-Procedure   Staff -        Anesthesiologist:  Ilan Pedroza MD       Performed By: anesthesiologist       Location: OR       Procedure Start/Stop Times: 3/28/2023 7:16 AM and 3/28/2023 7:20 AM       Pre-Anesthestic Checklist: patient identified, IV checked, risks and benefits discussed, informed consent, monitors and equipment checked, pre-op evaluation, at physician/surgeon's request and post-op pain management  Timeout:       Correct Patient: Yes        Correct Procedure: Yes        Correct Site: Yes        Correct Position: Yes   Procedure Documentation  Procedure: intrathecal injection       Patient Position: sitting       Skin prep: Betadine       Insertion Site: L2-3. (midline approach).       Needle Gauge: 24.        Needle Length (Inches): 4        Spinal Needle Type: Pencan       Introducer used       Introducer: 20 G       # of attempts: 1 and  # of redirects:  1    Assessment/Narrative         CSF fluid: clear.    Medication(s) Administered   0.75% Hyperbaric Bupivacaine (Intrathecal) - Intrathecal   1.6 mL - 3/28/2023 7:16:00 AM  Medication Administration Time: 3/28/2023 7:16 AM      FOR [facility] ([facility]) ONLY:   Pain Team Contact information: please page the Pain Team Via [employer]. Search \"Pain\". During daytime hours, please page the attending first. At night please page the resident first.    "

## 2023-03-28 NOTE — INTERVAL H&P NOTE
"I have reviewed the surgical (or preoperative) H&P that is linked to this encounter, and examined the patient. There are no significant changes    Clinical Conditions Present on Arrival:  Clinically Significant Risk Factors Present on Admission                    # Obesity: Estimated body mass index is 37.92 kg/m  as calculated from the following:    Height as of this encounter: 1.651 m (5' 5\").    Weight as of this encounter: 103.4 kg (227 lb 14.4 oz).       "

## 2023-03-28 NOTE — OP NOTE
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    PROCEDURE PERFORMED   LEFT total knee arthroplasty revision, femoral and tibial components    IMPLANTS  Implant Name Type Inv. Item Serial No.  Lot No. LRB No. Used Action   BONE CEMENT SIMPLEX W/TOBRAMYCIN 6197-9-001 - [id number] Cement, Bone BONE CEMENT SIMPLEX W/TOBRAMYCIN 6197-9-001  [employer] [id number] Left 2 Implanted   FEMORAL CR L CEMENT MICHELE SZ 5 Total Joint Component/Insert   [employer] [id number] Left 1 Explanted   BASEPLATE TIBIAL SZ-5 CRS ROTATING PLATFORM INSERT AOX 6MM - [id number] Total Joint Component/Insert BASEPLATE TIBIAL SZ-5 CRS ROTATING PLATFORM INSERT AOX 6MM  [employer]- [id number] Right 1 Explanted   INSERT FIX BEARING CR SZ 5 5MM Total Joint Component/Insert   [employer] [id number] Left 1 Explanted   ATUNE REV RP TIB BASE SZ 5 MADELAINE - [id number] Total Joint Component/Insert ATUNE REV RP TIB BASE SZ 5 MADELAINE  [employer]- [id number] Left 1 Implanted   ATTUNE CRS FEMORAL LT SZ 5 MADELAINE - [id number] Total Joint Component/Insert ATTUNE CRS FEMORAL LT SZ 5 MADELAINE  [employer]- [id number] Left 1 Implanted   ATUN TIB SLV M/L 37MM FULL POR - [id number] Total Joint Component/Insert ATUN TIB SLV M/L 37MM FULL POR  [employer]- [id number] Left 1 Implanted   STEM XTN 110MM 14MM ATTUNE KN PRFT REV STRL LF - [id number] Total Joint Component/Insert STEM XTN 110MM 14MM ATTUNE KN PRFT REV STRL LF  [employer]- [id number] Left 1 Implanted   WEDGE FEMORAL SLEEVE POROCOAT FULLY COATED 30MM F/KNEE ARTHR - [id number] Total Joint Component/Insert WEDGE FEMORAL SLEEVE POROCOAT FULLY COATED 30MM F/KNEE ARTHR  [employer]- [id number] Left 1 Implanted   ATUNE PRESSFIT STR [id number] - [id number] Total Joint Component/Insert ATUNE PRESSFIT STR  [id number]  [employer] [facility]- [id number] Left 1 Implanted   BASEPLATE TIBIAL SZ-5 CRS ROTATING PLATFORM INSERT AOX 6MM - [id number] Total Joint Component/Insert BASEPLATE TIBIAL SZ-5 CRS ROTATING PLATFORM INSERT AOX 6MM  [employer] [facility]- [id number] Left 1 Implanted       SURGEON  James Roberson MD     ASSISTANT   Laura Orozco PA-C; assistant was required for patient positioning, surgical assistance, wound closure and monitoring patient's safety throughout the case.    ANESTHESIA  Choice      FINDINGS:  1.  Loose tibial component.  2.  Femoral component well fixed, removed.  3.  Well fixed patellar button, maintained.  4.  No evidence of infection.    SPECIMENS:  none    ESTIMATED BLOOD LOSS:  50 cc    COMPLICATIONS   None.        INDICATION FOR PROCEDURE  Katie Kramer is a 72 year old female who I evaluated in my clinic for severe LEFT knee pain.  She is status post left total knee arthroplasty in 2018.  She has gone on to develop pain and a bone scan concerning for loosening.  Infectious work-up was negative.  She has been indicated for revision.    PREOPERATIVE EXAMINATION:   Well-healed incision.  Minimal effusion.  No significant flexion contracture.  Flexion to about 100 degrees.    INFORMED CONSENT  Katie Kramer was identified in the preoperative holding area and was identified using medical record number, name, and date of birth, all of which were confirmed. The operative extremity was marked using an indelible marker. Once again, the risks, benefits and expected outcomes of the procedure were discussed in full.  This discussion included, but was not limited to: Bleeding, nerve/vascular injury, fracture, instability, implant complications, continued pain, stiffness, scarring/incision numbness, infection, anesthetic/medical complications, death.  After this discussion patient elected to proceed with procedure, and did sign informed consent.    DESCRIPTION OF PROCEDURE   Katie Kramer received a  regional block in the preoperative holding area.  They were then brought back to the operating room and placed on the operating table in the supine position.  All bony prominences were well-padded.  A well-padded tourniquet was placed high on the operative thigh. The operative leg was then sterilely prepped and draped in the usual fashion with ChloraPrep.     A timeout was performed prior to the start of the procedure.  This confirmed the patient's name, correct site and side of surgery, and the procedure being performed.  Allergies were also confirmed.  All necessary implants were confirmed and available.  Administration of IV antibiotics and TXA were confirmed. Three independent staff members agreed with the information discussed in the timeout.     The leg was exsanguinated and the tourniquet was inflated.  A midline approach to the knee was utilized.  Sharp dissection was performed down to the level of the capsule. Medial and lateral skin flaps were raised.  A medial parapatellar arthrotomy was performed.  Medial release was performed.  Implant interface between the femoral component and the bone was exposed circumferentially.  Soft tissue behind the patellar tendon was resected.  The knee was flexed up, the polyethylene was removed utilizing an osteotome.     ACL saw followed by flexible osteotomes utilized circumferentially around the femoral component to gently remove this with minimal bone loss but some stress shielding within the central part of the femur which I felt would necessitate sleeve fixation.  Attention was then turned to the tibia.  I did have to use an ACL saw and a flexible osteotome to loosen up the remaining adhesions between the tibia and the tibial plateau.  The tibial component was then removed with some metaphyseal bone loss.  The remaining cement was removed.  I then turned my attention to reconstruction.    I reamed for both the tibia as well as the femur sequentially.  I elected to use  a longer stem in the tibia given the moderate amount of metaphyseal and posterior bone loss.  Once the reaming was done, attention was turned to tibial reconstruction.  I broached for a sleeve.  I broached to a 37 mm sleeve with good fit and rotational stability I did countersink this about a millimeter or so.  A trial implant was then assembled on the back table and inserted, keel punch, and attention was turned to the femoral reconstruction.    I broached for a sleeve as I felt this would help enhance the fixation given the metaphyseal stress shielding.  I utilized a 30 mm sleeve and had good fit and rotational stability with this.  Femur was appropriately sized, and using gap balancing technique I placed the 4-in-1 guide into appropriate rotation relative to the tibial plateau and pinned this into position.  I then made all of the cuts.  I cut the box.  No augments were needed.  I then applied the femoral trial, inserted a 6 mm polyethylene without significant difficulty and brought the knee down to full extension.    With these implants in place, good medial and lateral balance and full extension as well as 90 degrees of flexion the patella tracked and attained 130 degrees of flexion.  I was very happy with this.  As such, trial implants were removed.  Bony surfaces were thoroughly irrigated, soaked with Irrisept, local injection utilized throughout.  In the meantime, the implants were opened and assembled on the back table appropriately.    Cement was mixed.  Cement was applied to the undersurface of the tibial implant.  Cement was applied to the tibial plateau.  I gently then impacted the tibial component into position, paying special attention the rotation, gently impacted it, removed all of the circumferential excess cement.  I then applied cement to the undersurface of the femoral component as well as the distal femur and impacted the femoral component, paying special attention the rotation and fit, and  remove the excess cement.  Final 6 mm polyethylene was then inserted and the knee was brought down to full extension and the cement was allowed to fully cure.  While curing, we again thoroughly irrigated the knee.    Balance exam was then unchanged and as such I inserted the final 6 mm polyethylene without difficulty and again balance exam unchanged.  Serial Irrisept soaks then performed, knee irrigated, 1 g of vancomycin powder placed into the knee and the arthrotomy was closed with a #5 Ethibond followed by running #1 strata fix.  2-0 vicryl, and 3-0 monocryl, followed by skin glue. The aquacel dressing was applied, followed by a full leg ACE wrap.      The patient was then transferred to the postoperative bed, awoken from anesthesia and taken to recovery in stable condition.  There were no complications.  The patient tolerated the procedure well.    POSTOPERATIVE PLAN:  -Pain control  -PT/OT, WBAT  -24 hours antibiotics, 2 weeks oral cefadroxil on discharge  -ASA for DVT ppx  -X-rays to be completed in PACU    James Roberson MD  [facility]

## 2023-03-28 NOTE — ANESTHESIA POSTPROCEDURE EVALUATION
Patient: Katie Kramer    Procedure: Procedure(s):  REVISION OF LEFT TOTAL KNEE ARTHROPLASTY       Anesthesia Type:  Spinal    Note:  Disposition: Inpatient   Postop Pain Control: Uneventful            Sign Out: Well controlled pain   PONV: No   Neuro/Psych: Uneventful            Sign Out: Acceptable/Baseline neuro status   Airway/Respiratory: Uneventful            Sign Out: Acceptable/Baseline resp. status   CV/Hemodynamics: Uneventful            Sign Out: Acceptable CV status; No obvious hypovolemia; No obvious fluid overload   Other NRE: NONE   DID A NON-ROUTINE EVENT OCCUR? No           Last vitals:  Vitals Value Taken Time   /65 03/28/23 1020   Temp 36.7  C (98  F) 03/28/23 1010   Pulse 73 03/28/23 1023   Resp 19 03/28/23 1023   SpO2 98 % 03/28/23 1023   Vitals shown include unvalidated device data.    Electronically Signed By: Ilan Pedroza MD  March 28, 2023  10:25 AM

## 2023-03-28 NOTE — PROGRESS NOTES
03/28/23 1500   Appointment Info   Signing Clinician's Name / Credentials (PT) Karla Hawk, PT, DPT   Living Environment   People in Home spouse   Current Living Arrangements house   Home Accessibility stairs to enter home   Number of Stairs, Main Entrance 2   Stair Railings, Main Entrance railing on left side (ascending)   Living Environment Comments Able to stay on main level   Self-Care   Equipment Currently Used at Home none   Activity/Exercise/Self-Care Comment Has FWW at home   General Information   Onset of Illness/Injury or Date of Surgery 03/28/23   Referring Physician Dr. James Roberson   Patient/Family Therapy Goals Statement (PT) Walk without pain   Pertinent History of Current Problem (include personal factors and/or comorbidities that impact the POC) S/P L TKA   Weight-Bearing Status - LLE weight-bearing as tolerated   Weight-Bearing Status - RLE full weight-bearing   Transfers   Transfers sit-stand transfer   Maintains Weight-bearing Status (Transfers) able to maintain   Sit-Stand Transfer   Sit-Stand Sharon (Transfers) contact guard;verbal cues   Assistive Device (Sit-Stand Transfers) walker, front-wheeled   Gait/Stairs (Locomotion)   Sharon Level (Gait) contact guard;verbal cues   Assistive Device (Gait) walker, front-wheeled   Distance in Feet 10   Distance in Feet (Gait) verbal cueing for posture, safety, LE advancement   Pattern (Gait) step-to   Deviations/Abnormal Patterns (Gait) festinating/shuffling;rosa maria decreased   Maintains Weight-bearing Status (Gait) able to maintain   Clinical Impression   Criteria for Skilled Therapeutic Intervention Yes, treatment indicated   PT Diagnosis (PT) Impaired functional mobility   Influenced by the following impairments weakness, pain   Functional limitations due to impairments transfers, gait, stairs   Clinical Presentation (PT Evaluation Complexity) Stable/Uncomplicated   Clinical Presentation Rationale Pt presents as medically  diagnosed   Clinical Decision Making (Complexity) low complexity   Planned Therapy Interventions (PT) gait training;home exercise program;patient/family education;transfer training;stair training   Anticipated Equipment Needs at Discharge (PT) walker, rolling   Risk & Benefits of therapy have been explained evaluation/treatment results reviewed;care plan/treatment goals reviewed;patient   PT Total Evaluation Time   PT Eval, Low Complexity Minutes (18867) 10   Physical Therapy Goals   PT Frequency Daily   PT Predicted Duration/Target Date for Goal Attainment 03/29/23   PT Goals Gait;Stairs;PT Goal 1   PT: Gait Modified independent;Rolling walker;150 feet   PT: Stairs Supervision/stand-by assist;2 stairs;Rail on left   PT: Goal 1 Independent with TK HEP   Interventions   Interventions Quick Adds Gait Training;Therapeutic Activity;Therapeutic Procedure   Therapeutic Procedure/Exercise   Ther. Procedure: strength, endurance, ROM, flexibillity Minutes (77380) 15   Symptoms Noted During/After Treatment fatigue;increased pain   Treatment Detail/Skilled Intervention TK exercises x 10 reps with L LE, SBA verbal cueing for exercise technique   Therapeutic Activity   Treatment Detail/Skilled Intervention Sit<>stand with FWW, CGA, verbal cueing for safety   Gait Training   Gait Training Minutes (94860) 10   Symptoms Noted During/After Treatment (Gait Training) fatigue;increased pain   Treatment Detail/Skilled Intervention 250   [place] Level (Gait Training) contact guard   Physical Assistance Level (Gait Training) verbal cues   Weight Bearing (Gait Training) weight-bearing as tolerated   Assistive Device (Gait Training) rolling walker   Pattern Analysis (Gait Training) swing-through gait   Gait Analysis Deviations decreased rosa maria;decreased step length  (flexed posture)   Impairments (Gait Analysis/Training) pain;strength decreased   PT Discharge Planning   PT Plan Gait, stairs, TK exercises   PT Discharge Recommendation  (DC Rec) (S)  home with outpatient physical therapy   PT Rationale for DC Rec Patient ambulating safely with FWW, spouse for support at home   PT Brief overview of current status Amb 250ft with FWW, CGA   Total Session Time   Timed Code Treatment Minutes 25   Total Session Time (sum of timed and untimed services) 35

## 2023-03-28 NOTE — PHARMACY-ADMISSION MEDICATION HISTORY
Pharmacy Note - Admission Medication History    Pertinent Provider Information: Utilized pt's home med list with written dates of last times taken.   ______________________________________________________________________    Prior To Admission (PTA) med list completed and updated in EMR.       PTA Med List   Medication Sig Last Dose     amLODIPine (NORVASC) 5 MG tablet Take 5 mg by mouth every evening 3/27/2023 at pm     amoxicillin (AMOXIL) 500 MG capsule Take 2,000 mg by mouth as needed (dental procedures) Unknown at dental     ARIPiprazole (ABILIFY) 2 MG tablet Take 2 mg by mouth every evening 3/27/2023 at pm     aspirin (ASA) 81 MG chewable tablet Take 81 mg by mouth daily LD 3/20 3/20/2023 at pm     atorvastatin (LIPITOR) 40 MG tablet Take 1 tablet by mouth At Bedtime 3/27/2023 at pm     buPROPion (WELLBUTRIN SR) 200 MG 12 hr tablet Take 200 mg by mouth daily 3/28/2023 at am     calcium carbonate 500 mg, elemental, (OSCAL 500) 1250 (500 Ca) MG TABS tablet Take 1 tablet by mouth 2 times daily 3/20/2023     empagliflozin (JARDIANCE) 10 MG TABS tablet Take 10 mg by mouth daily 3/24/2023 at am     hydrocortisone 2.5 % ointment Apply topically 2 times daily as needed Unknown at prn     LORazepam (ATIVAN) 1 MG tablet Take 1 mg by mouth every 6 hours as needed With dental work Unknown     losartan (COZAAR) 25 MG tablet Take 25 mg by mouth every evening 3/27/2023 at am     metoprolol succinate ER (TOPROL XL) 100 MG 24 hr tablet Take 100 mg by mouth daily 3/28/2023 at am     metroNIDAZOLE (METROCREAM) 0.75 % external cream Apply topically 2 times daily 3/27/2023     Multiple Vitamins-Minerals (PRESERVISION/LUTEIN PO) Take 1 tablet by mouth 2 times daily 3/20/2023     omeprazole-sodium bicarbonate (ZEGERID)  MG per capsule Take 1 capsule by mouth every morning (before breakfast) 3/28/2023     polyethylene glycol (MIRALAX/GLYCOLAX) powder Take 17 g by mouth nightly as needed Unknown     Polyethylene Glycol 400  (BLINK TEARS OP) Apply 1 drop to eye 3 times daily 3/20/2023     scopolamine (TRANSDERM) 1 MG/3DAYS 72 hr patch Place 1 patch onto the skin every 72 hours For travel Unknown     sertraline (ZOLOFT) 100 MG tablet Take 200 mg by mouth daily 3/28/2023 at am     spironolactone (ALDACTONE) 25 MG tablet Take 25 mg by mouth every evening 3/27/2023 at am     Vitamin D, Cholecalciferol, 10 MCG (400 UNIT) TABS Take 800 Units by mouth 2 times daily 3/20/2023     zolpidem (AMBIEN) 10 MG tablet Take 10 mg by mouth nightly as needed for sleep rare Unknown       Information source(s): Clinic records provided by pt    Method of interview communication: N/A    Patient was asked about OTC/herbal products specifically.  PTA med list reflects this.    Based on the pharmacist's assessment, the PTA med list information appears reliable    Allergies were reviewed, assessed, and updated with the patient.      Patient does not anticipate needing any multi-use medications during admission.     Thank you for the opportunity to participate in the care of this patient.      Jocy Walker, PharmD 3/28/2023     6:48 AM

## 2023-03-28 NOTE — CONSULTS
INTERNAL MEDICINE CONSULT    Physician requesting consult: Dr. Roberson  Reason for consult: Hypertension sleep apnea    Assessment and Plan:  Hypertension:  Blood pressure is stable  Continue metoprolol, amlodipine  Hold Aldactone, losartan until tomorrow    Dyslipidemia  On statin    History of CHF with preserved EF  H/O SVT   Most recent EF 63%  Well compensated   On Aldactone, metoprolol  On Jardiance    Anxiety depression  History of obsessive-compulsive disorder  Continue Abilify, bupropion, Zoloft    History of insomnia  On Ambien as needed    History of iron deficiency anemia  Continue home iron    History of GERD  Continue PPI    History of prediabetes  Follow-up as outpatient    Obstructive sleep apnea status post oral surgery    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  Continue PT OT, pain control, DVT prophylaxis per orthopedic surgery.  Encourage incentive spirometry, monitor hemoglobin    HPI:     Katie Kramer is a 72 year old old female with past history significant for hypertension, CHF, SVT, obstructive sleep apnea, anxiety, depression prediabetes  dyslipidemia admitted postoperatively after she underwent a total knee arthroplasty revision.  Tolerated procedure well.  Estimated blood loss 50 mL. Currently denies any complaints such as nausea vomiting shortness of breath chest pain abdominal pain. Denies any previous history of blood clots.  Preop history and physical reviewed.     Medical History    Past Medical History:   Diagnosis Date     Anemia      Anxiety      Arthritis      Chronic fatigue      Congestive heart failure (H)      Depression      Eosinophilic esophagitis      Esophageal dysmotility      GERD (gastroesophageal reflux disease)      History of anesthesia complications      Hypercalcemia     Treated via parathyroidectomy x1     Hypertension      Irregular heart beat      Macular degeneration      Motion sickness      Obese      OCD (obsessive compulsive  disorder)      Perspiration excessive      PONV (postoperative nausea and vomiting)      Rosacea      Sleep apnea     uses mouthguard     Uncomplicated asthma       Patient Active Problem List    Diagnosis Date Noted     Status post joint replacement 12/16/2022     Priority: Medium     Essential hypertension 12/16/2022     Priority: Medium     Obstructive sleep apnea syndrome in adult 04/22/2019     Priority: Medium     Formatting of this note might be different from the original.  Added automatically from request for surgery [id number]       Anxiety and depression 04/22/2019     Priority: Medium     Esophageal reflux 02/06/2007     Priority: Medium        Surgical History  She  has a past surgical history that includes Parathyroidectomy; GYN surgery; carpal tunnel release rt/lt; Hysterectomy; Dilation and curettage; Bunionectomy (Bilateral); Arthroscopy knee (Right); TOTAL KNEE ARTHROPLASTY (Left, 09/25/2018); joint replacement; other surgical history; Parathyroidectomy; Release carpal tunnel (Bilateral); Blepharoplasty (Bilateral); TOTAL KNEE ARTHROPLASTY (Right, 11/27/2018); Cv Coronary Angiogram (N/A, 06/08/2021); Cv Left Heart Catheterization Without Left Ventriculogram (Left, 06/08/2021); Uvulopalatopharyngoplasty; and Arthroplasty revision knee (Right, 12/16/2022).     Past Surgical History:   Procedure Laterality Date     ARTHROPLASTY REVISION KNEE Right 12/16/2022    Procedure: REVISION OF RIGHT TOTAL KNEE ARTHROPLASTY;  Surgeon: James Roberson MD;  Location: [facility] OR     ARTHROSCOPY KNEE Right      BLEPHAROPLASTY Bilateral      BUNIONECTOMY Bilateral      CARPAL TUNNEL RELEASE RT/LT      knee replacement     CV CORONARY ANGIOGRAM N/A 06/08/2021    Procedure: Coronary Angiogram;  Surgeon: Oly Woody MD;  Location: [facility] Cardiac Cath Lab;  Service: Cardiology     CV LEFT HEART CATHETERIZATION WITHOUT LEFT VENTRICULOGRAM Left 06/08/2021    Procedure: Left Heart Catheterization Without  Left Ventriculogram;  Surgeon: Oly Woody MD;  Location: [facility] Cardiac Cath Lab;  Service: Cardiology     DILATION AND CURETTAGE       GYN SURGERY      hysterectomy     HYSTERECTOMY      Right ovary remains     JOINT REPLACEMENT       OTHER SURGICAL HISTORY      Hammertoe     PARATHYROIDECTOMY       PARATHYROIDECTOMY      Partial x1     RELEASE CARPAL TUNNEL Bilateral      UVULOPALATOPHARYNGOPLASTY       [facility] TOTAL KNEE ARTHROPLASTY Left 09/25/2018    Procedure: LEFT TOTAL KNEE ARTHROPLASTY;  Surgeon: Osito Harrell MD;  Location: [facility];  Service: Orthopedics     [facility] TOTAL KNEE ARTHROPLASTY Right 11/27/2018    Procedure: RIGHT TOTAL KNEE ARTHROPLASTY;  Surgeon: Osito Harrell MD;  Location: [facility];  Service: Orthopedics       Allergies  Allergies   Allergen Reactions     Adhesive Tape      Animal Dander      Morphine Nausea     Seasonal Allergies      Latex Rash     sensitivity       Prior to Admission Medications   Medications Prior to Admission   Medication Sig Dispense Refill Last Dose     amLODIPine (NORVASC) 5 MG tablet Take 5 mg by mouth every evening   3/27/2023 at pm     amoxicillin (AMOXIL) 500 MG capsule Take 2,000 mg by mouth as needed (dental procedures)   Unknown at dental     ARIPiprazole (ABILIFY) 2 MG tablet Take 2 mg by mouth every evening   3/27/2023 at pm     aspirin (ASA) 81 MG chewable tablet Take 81 mg by mouth daily LD 3/20   3/20/2023 at pm     atorvastatin (LIPITOR) 40 MG tablet Take 1 tablet by mouth At Bedtime   3/27/2023 at pm     buPROPion (WELLBUTRIN SR) 200 MG 12 hr tablet Take 200 mg by mouth daily   3/28/2023 at am     calcium carbonate 500 mg, elemental, (OSCAL 500) 1250 (500 Ca) MG TABS tablet Take 1 tablet by mouth 2 times daily   3/20/2023     empagliflozin (JARDIANCE) 10 MG TABS tablet Take 10 mg by mouth daily   3/24/2023 at am     hydrocortisone 2.5 % ointment Apply topically 2 times daily as needed   Unknown at prn     LORazepam (ATIVAN) 1 MG  tablet Take 1 mg by mouth every 6 hours as needed With dental work   Unknown     losartan (COZAAR) 25 MG tablet Take 25 mg by mouth every evening   3/27/2023 at am     metoprolol succinate ER (TOPROL XL) 100 MG 24 hr tablet Take 100 mg by mouth daily   3/28/2023 at am     metroNIDAZOLE (METROCREAM) 0.75 % external cream Apply topically 2 times daily   3/27/2023     Multiple Vitamins-Minerals (PRESERVISION/LUTEIN PO) Take 1 tablet by mouth 2 times daily   3/20/2023     omeprazole-sodium bicarbonate (ZEGERID)  MG per capsule Take 1 capsule by mouth every morning (before breakfast)   3/28/2023     polyethylene glycol (MIRALAX/GLYCOLAX) powder Take 17 g by mouth nightly as needed   Unknown     Polyethylene Glycol 400 (BLINK TEARS OP) Apply 1 drop to eye 3 times daily   3/20/2023     scopolamine (TRANSDERM) 1 MG/3DAYS 72 hr patch Place 1 patch onto the skin every 72 hours For travel   Unknown     sertraline (ZOLOFT) 100 MG tablet Take 200 mg by mouth daily   3/28/2023 at am     spironolactone (ALDACTONE) 25 MG tablet Take 25 mg by mouth every evening   3/27/2023 at am     Vitamin D, Cholecalciferol, 10 MCG (400 UNIT) TABS Take 800 Units by mouth 2 times daily   3/20/2023     zolpidem (AMBIEN) 10 MG tablet Take 10 mg by mouth nightly as needed for sleep rare   Unknown     ferrous sulfate (FE TABS) 325 (65 Fe) MG EC tablet Take 325 mg by mouth every evening   3/20/2023     order for DME Equipment being ordered: Mandibular advancement device for treatment of moderate obstructive sleep apnea (AHI 16, PSG 7/16/2017 at [facility]) 1 Units 0        Social History  Reviewed, and she  reports that she has never smoked. She has never used smokeless tobacco. She reports that she does not drink alcohol and does not use drugs.  Social History     Tobacco Use     Smoking status: Never     Smokeless tobacco: Never   Substance Use Topics     Alcohol use: No       Family History  Reviewed, and father with h/o mesothelioma.  "    Review Of Systems  As per admission HPI, all others reviewed and negative.     Physical Exam:  BP (!) 147/78 (BP Location: Right arm)   Pulse 64   Temp 98.3  F (36.8  C) (Oral)   Resp 18   Ht 1.651 m (5' 5\")   Wt 103.4 kg (227 lb 14.4 oz)   SpO2 98%   BMI 37.92 kg/m    General Appearance:  Awake Alert, orientedx3, not in any apparent distress   Head:  Normocephalic, without obvious abnormality   Eyes:  PERRL, conjunctiva/corneas clear   Throat: Oral mucosa moist   Neck: Supple,  no JVD   Lungs:   Clear to auscultation bilaterally, respirations unlabored   Chest Wall:  No tenderness   Heart:  Regular rate and rhythm, S1, S2 normal,no murmur   Abdomen:   Soft, non-tender, bowel sounds present,  no masses, no organomegaly   Extremities: S/p left TKA revision    Skin: Skin color, texture, turgor normal, no rashes or lesions   Neurologic: Alert and oriented X 3, exam on operated extremity defer to surgery     Results:  Results for orders placed or performed during the hospital encounter of 03/28/23   POC US Guidance Needle Placement     Status: None    Narrative    Ultrasound was performed as guidance to an anesthesia procedure.  Click   \"PACS images\" hyperlink below to view any stored images.  For specific   procedure details, view procedure note authored by anesthesia.   XR Knee Port Left 1/2 Views     Status: None    Narrative    EXAM: XR KNEE PORT LEFT 1/2 VIEWS  LOCATION: [facility]  DATE/TIME: 3/28/2023 10:37 AM    INDICATION: Post op total knee.  COMPARISON: None.      Impression    IMPRESSION: Postop changes of a recent left total knee arthroplasty with patellar resurfacing with longstem femoral and tibial components. No fracture.   Glucose by meter     Status: Abnormal   Result Value Ref Range    GLUCOSE BY METER POCT 106 (H) 70 - 99 mg/dL       Imaging:   POC US Guidance Needle Placement    Result Date: 3/28/2023  Ultrasound was performed as guidance to an anesthesia " "procedure.  Click \"PACS images\" hyperlink below to view any stored images.  For specific procedure details, view procedure note authored by anesthesia.    XR Knee Port Left 1/2 Views    Result Date: 3/28/2023  EXAM: XR KNEE PORT LEFT 1/2 VIEWS LOCATION: [facility] DATE/TIME: 3/28/2023 10:37 AM INDICATION: Post op total knee. COMPARISON: None.     IMPRESSION: Postop changes of a recent left total knee arthroplasty with patellar resurfacing with longstem femoral and tibial components. No fracture.        Zulma Oliva M.D  [facility]  Internal Medicine    3/28/2023  1:18 PM        "

## 2023-03-28 NOTE — ANESTHESIA PROCEDURE NOTES
"Adductor canal Procedure Note    Pre-Procedure   Staff -        Anesthesiologist:  Ilan Pedroza MD       Performed By: anesthesiologist       Location: pre-op       Procedure Start/Stop Times: 3/28/2023 6:56 AM and 3/28/2023 7:00 AM       Pre-Anesthestic Checklist: patient identified, IV checked, site marked, risks and benefits discussed, informed consent, monitors and equipment checked, pre-op evaluation, at physician/surgeon's request and post-op pain management  Timeout:       Correct Patient: Yes        Correct Procedure: Yes        Correct Site: Yes        Correct Position: Yes        Correct Laterality: Yes        Site Marked: Yes  Procedure Documentation  Procedure: Adductor canal       Laterality: left       Patient Position: sitting       Skin prep: Chloraprep       Needle Type: insulated       Needle Gauge: 20.        Needle Length (Inches): 4        Ultrasound guided       1. Ultrasound was used to identify targeted nerve, plexus, vascular marker, or fascial plane and place a needle adjacent to it in real-time.       2. Ultrasound was used to visualize the spread of anesthetic in close proximity to the above referenced structure.       3. A permanent image is entered into the patient's record.       4. The visualized anatomic structures appeared normal.       5. There were no apparent abnormal pathologic findings.    Assessment/Narrative         Bolus given via needle..        Secured via.        Insertion/Infusion Method: Single Shot       Complications: none    Medication(s) Administered   Bupivacaine 0.5% PF (Infiltration) - Infiltration   20 mL - 3/28/2023 6:56:00 AM  Medication Administration Time: 3/28/2023 6:56 AM      FOR [facility] ([facility]) ONLY:   Pain Team Contact information: please page the Pain Team Via [employer]. Search \"Pain\". During daytime hours, please page the attending first. At night please page the resident first.    "

## 2023-03-28 NOTE — ANESTHESIA PREPROCEDURE EVALUATION
Anesthesia Pre-Procedure Evaluation    Patient: Katie Kramer   MRN: [id number] : 1950        Procedure : Procedure(s):  REVISION OF LEFTTOTAL KNEE ARTHROPLASTY          Past Medical History:   Diagnosis Date     Anemia      Anxiety      Arthritis      Chronic fatigue      Congestive heart failure (H)      Depression      Eosinophilic esophagitis      Esophageal dysmotility      GERD (gastroesophageal reflux disease)      History of anesthesia complications      Hypercalcemia     Treated via parathyroidectomy x1     Hypertension      Irregular heart beat      Macular degeneration      Motion sickness      Obese      OCD (obsessive compulsive disorder)      Perspiration excessive      PONV (postoperative nausea and vomiting)      Rosacea      Sleep apnea     uses mouthguard     Uncomplicated asthma       Past Surgical History:   Procedure Laterality Date     ARTHROPLASTY REVISION KNEE Right 2022    Procedure: REVISION OF RIGHT TOTAL KNEE ARTHROPLASTY;  Surgeon: James Roberson MD;  Location: [facility] OR     ARTHROSCOPY KNEE Right      BLEPHAROPLASTY Bilateral      BUNIONECTOMY Bilateral      CARPAL TUNNEL RELEASE RT/LT      knee replacement     CV CORONARY ANGIOGRAM N/A 2021    Procedure: Coronary Angiogram;  Surgeon: Oly Woody MD;  Location: [facility] Cardiac Cath Lab;  Service: Cardiology     CV LEFT HEART CATHETERIZATION WITHOUT LEFT VENTRICULOGRAM Left 2021    Procedure: Left Heart Catheterization Without Left Ventriculogram;  Surgeon: Oly Woody MD;  Location: [facility] Cardiac Cath Lab;  Service: Cardiology     DILATION AND CURETTAGE       GYN SURGERY      hysterectomy     HYSTERECTOMY      Right ovary remains     JOINT REPLACEMENT       OTHER SURGICAL HISTORY      Hammertoe     PARATHYROIDECTOMY       PARATHYROIDECTOMY      Partial x1     RELEASE CARPAL TUNNEL Bilateral      UVULOPALATOPHARYNGOPLASTY       [facility] TOTAL KNEE ARTHROPLASTY Left 2018     Procedure: LEFT TOTAL KNEE ARTHROPLASTY;  Surgeon: Osito Harrell MD;  Location: [facility] OR;  Service: Orthopedics     Z TOTAL KNEE ARTHROPLASTY Right 11/27/2018    Procedure: RIGHT TOTAL KNEE ARTHROPLASTY;  Surgeon: Osito Harrell MD;  Location: [facility] OR;  Service: Orthopedics      Allergies   Allergen Reactions     Adhesive Tape      Animal Dander      Morphine Nausea     Seasonal Allergies      Latex Rash     sensitivity      Social History     Tobacco Use     Smoking status: Never     Smokeless tobacco: Never   Substance Use Topics     Alcohol use: No      Wt Readings from Last 1 Encounters:   03/28/23 103.4 kg (227 lb 14.4 oz)        Anesthesia Evaluation   Pt has had prior anesthetic. Type: General and Regional.    History of anesthetic complications  - PONV.      ROS/MED HX  ENT/Pulmonary: Comment: Recent UPPP surgery at [facility] in 8/2022    (+) sleep apnea, asthma Treatment: Inhaler prn,      Neurologic:    (-) no seizures and no CVA   Cardiovascular:     (+) hypertension--CAD (mild noted on [facility] in 2021) ---CHF etiology: diastolic Last EF: 65% date: 11/23/2022 dysrhythmias (SVT), Previous cardiac testing   Echo: Date: 11/23/2022 Results:  Final Impressions   1. This study was performed per research protocol IRB # [id number].   2. Normal left ventricular chamber size. Calculated ejection fraction 65%.   3. No regional wall motion abnormalities.   4. Normal right ventricular chamber size and systolic function.   5. Estimated right ventricular systolic pressure 25 mmHg (right atrial pressure of 5 mmHg).     Stress Test: Date: Results:    ECG Reviewed: Date: 11/23/2022 Results:  Sinus rhythm   Normal ECG     Cath: Date: Results:      METS/Exercise Tolerance: >4 METS Comment: Recently went on cruise. States she was up to 59900 steps in a day.    Hematologic:       Musculoskeletal:       GI/Hepatic:     (+) GERD, esophageal disease (Achalasia),     Renal/Genitourinary:  - neg Renal ROS     Endo:      (+) Obesity,     Psychiatric/Substance Use:     (+) psychiatric history anxiety and depression     Infectious Disease:       Malignancy:       Other:            Physical Exam    Airway        Mallampati: III   TM distance: > 3 FB   Neck ROM: full   Mouth opening: > 3 cm    Respiratory Devices and Support         Dental  no notable dental history         Cardiovascular          Rhythm and rate: regular and normal     Pulmonary           breath sounds clear to auscultation           OUTSIDE LABS:  CBC:   Lab Results   Component Value Date    WBC 7.9 12/16/2022    WBC 7.3 06/09/2021    HGB 11.5 (L) 12/17/2022    HGB 13.1 12/16/2022    HCT 41.0 12/16/2022    HCT 38.1 06/09/2021     12/16/2022     06/09/2021     BMP:   Lab Results   Component Value Date     12/16/2022     06/09/2021    POTASSIUM 4.5 12/16/2022    POTASSIUM 4.1 06/09/2021    CHLORIDE 105 12/16/2022    CHLORIDE 108 (H) 06/09/2021    CO2 25 12/16/2022    CO2 25 06/09/2021    BUN 15 12/16/2022    BUN 13 06/09/2021    CR 0.82 12/16/2022    CR 0.73 06/09/2021     (H) 03/28/2023     12/17/2022     COAGS:   Lab Results   Component Value Date    PTT 30 11/14/2019    INR 0.93 11/14/2019     POC: No results found for: BGM, HCG, HCGS  HEPATIC:   Lab Results   Component Value Date    ALBUMIN 3.5 06/08/2021    PROTTOTAL 5.7 (L) 06/08/2021    ALT 16 06/08/2021    AST 14 06/08/2021    ALKPHOS 95 06/08/2021    BILITOTAL 0.4 06/08/2021     OTHER:   Lab Results   Component Value Date    A1C 5.7 (H) 12/16/2022    CLEM 10.0 12/16/2022    MAG 2.2 06/09/2021       Anesthesia Plan    ASA Status:  3   NPO Status:  NPO Appropriate    Anesthesia Type: Spinal.      Maintenance: TIVA.        Consents    Anesthesia Plan(s) and associated risks, benefits, and realistic alternatives discussed. Questions answered and patient/representative(s) expressed understanding.     - Discussed: Risks, Benefits and Alternatives for BOTH SEDATION and the  PROCEDURE were discussed     - Discussed with:  Patient, Spouse      - Extended Intubation/Ventilatory Support Discussed: No.      - Patient is DNR/DNI Status: No    Use of blood products discussed: No .     Postoperative Care    Pain management: Peripheral nerve block (Single Shot).   PONV prophylaxis: Ondansetron (or other 5HT-3), Dexamethasone or Solumedrol, Background Propofol Infusion     Comments:    Other Comments: Diastolic HF, will be judicious with fluids. Spinal + adductor canal PNB for POP per surgeon request.   12/16/2022 labs hgb 13.1, Platelet 371, K 4.5                Ilan Pedroza MD

## 2023-03-28 NOTE — PROGRESS NOTES
Pt is not appropriate for skilled OT services at this time due to this being her 4th TKA surgery.  Pt is aware and comfortable to ADLs while following her TKA precautions per PT report.  Will defer to PT for discharge recommendations.  Plan was discussed with PT.  Pt agrees.  Will D/C current OT orders. Thank you.    3/28/2023 by Chaitanya Hubbard, OTR, OTR/L

## 2023-03-28 NOTE — ANESTHESIA CARE TRANSFER NOTE
Patient: Katie Kramer    Procedure: Procedure(s):  REVISION OF LEFT TOTAL KNEE ARTHROPLASTY       Diagnosis: Failure of total knee arthroplasty (H) [T84.018A, Z96.659]  Diagnosis Additional Information: No value filed.    Anesthesia Type:   Spinal     Note:      Level of Consciousness: awake  Oxygen Supplementation: room air    Independent Airway: airway patency satisfactory and stable  Dentition: dentition unchanged  Vital Signs Stable: post-procedure vital signs reviewed and stable  Report to RN Given: handoff report given  Patient transferred to: PACU    Handoff Report: Identifed the Patient, Identified the Reponsible Provider, Reviewed the pertinent medical history, Discussed the surgical course, Reviewed Intra-OP anesthesia mangement and issues during anesthesia, Set expectations for post-procedure period and Allowed opportunity for questions and acknowledgement of understanding      Vitals:  Vitals Value Taken Time   /64 03/28/23 1010   Temp 36.7  C (98  F) 03/28/23 1010   Pulse 76 03/28/23 1010   Resp 16 03/28/23 1010   SpO2 97 % 03/28/23 1010       Electronically Signed By: WINTER Garcia CRNA  March 28, 2023  10:12 AM

## 2023-03-29 ENCOUNTER — APPOINTMENT (OUTPATIENT)
Dept: PHYSICAL THERAPY | Facility: CLINIC | Age: 73
DRG: 467 | End: 2023-03-29
Attending: ORTHOPAEDIC SURGERY
Payer: MEDICARE

## 2023-03-29 VITALS
DIASTOLIC BLOOD PRESSURE: 64 MMHG | RESPIRATION RATE: 18 BRPM | TEMPERATURE: 99.1 F | BODY MASS INDEX: 37.97 KG/M2 | HEART RATE: 76 BPM | WEIGHT: 227.9 LBS | HEIGHT: 65 IN | OXYGEN SATURATION: 98 % | SYSTOLIC BLOOD PRESSURE: 109 MMHG

## 2023-03-29 LAB
GLUCOSE BLD-MCNC: 100 MG/DL (ref 70–125)
HGB BLD-MCNC: 11.7 G/DL (ref 11.7–15.7)

## 2023-03-29 PROCEDURE — 250N000013 HC RX MED GY IP 250 OP 250 PS 637: Performed by: ORTHOPAEDIC SURGERY

## 2023-03-29 PROCEDURE — 250N000011 HC RX IP 250 OP 636: Performed by: ORTHOPAEDIC SURGERY

## 2023-03-29 PROCEDURE — 36415 COLL VENOUS BLD VENIPUNCTURE: CPT | Performed by: ORTHOPAEDIC SURGERY

## 2023-03-29 PROCEDURE — 97116 GAIT TRAINING THERAPY: CPT | Mod: GP

## 2023-03-29 PROCEDURE — 99232 SBSQ HOSP IP/OBS MODERATE 35: CPT | Performed by: INTERNAL MEDICINE

## 2023-03-29 PROCEDURE — 85018 HEMOGLOBIN: CPT | Performed by: ORTHOPAEDIC SURGERY

## 2023-03-29 PROCEDURE — 250N000013 HC RX MED GY IP 250 OP 250 PS 637: Performed by: INTERNAL MEDICINE

## 2023-03-29 PROCEDURE — 82947 ASSAY GLUCOSE BLOOD QUANT: CPT | Performed by: ORTHOPAEDIC SURGERY

## 2023-03-29 RX ORDER — OXYCODONE HYDROCHLORIDE 5 MG/1
5 TABLET ORAL EVERY 4 HOURS PRN
Qty: 22 TABLET | Refills: 0 | Status: SHIPPED | OUTPATIENT
Start: 2023-03-29 | End: 2024-09-11

## 2023-03-29 RX ORDER — LOSARTAN POTASSIUM 25 MG/1
25 TABLET ORAL EVERY EVENING
Start: 2023-03-29

## 2023-03-29 RX ORDER — HYDROXYZINE HYDROCHLORIDE 10 MG/1
10 TABLET, FILM COATED ORAL EVERY 6 HOURS PRN
Qty: 20 TABLET | Refills: 0 | Status: SHIPPED | OUTPATIENT
Start: 2023-03-29 | End: 2024-09-11

## 2023-03-29 RX ORDER — CEFADROXIL 500 MG/1
500 CAPSULE ORAL 2 TIMES DAILY
Qty: 28 CAPSULE | Refills: 0 | Status: ON HOLD | OUTPATIENT
Start: 2023-03-29 | End: 2024-09-16

## 2023-03-29 RX ORDER — AMLODIPINE BESYLATE 5 MG/1
5 TABLET ORAL EVERY EVENING
Start: 2023-03-29

## 2023-03-29 RX ORDER — ACETAMINOPHEN 325 MG/1
650 TABLET ORAL EVERY 4 HOURS PRN
Status: ON HOLD
Start: 2023-03-31 | End: 2024-09-16

## 2023-03-29 RX ADMIN — BUPROPION HYDROCHLORIDE 200 MG: 100 TABLET, EXTENDED RELEASE ORAL at 08:00

## 2023-03-29 RX ADMIN — METOPROLOL SUCCINATE 100 MG: 100 TABLET, EXTENDED RELEASE ORAL at 08:00

## 2023-03-29 RX ADMIN — METHOCARBAMOL TABLETS 500 MG: 500 TABLET, COATED ORAL at 09:34

## 2023-03-29 RX ADMIN — KETOROLAC TROMETHAMINE 15 MG: 15 INJECTION, SOLUTION INTRAMUSCULAR; INTRAVENOUS at 00:51

## 2023-03-29 RX ADMIN — POLYETHYLENE GLYCOL AND PROPYLENE GLYCOL 2 DROP: 4; 3 SOLUTION/ DROPS OPHTHALMIC at 08:01

## 2023-03-29 RX ADMIN — KETOROLAC TROMETHAMINE 15 MG: 15 INJECTION, SOLUTION INTRAMUSCULAR; INTRAVENOUS at 06:26

## 2023-03-29 RX ADMIN — OXYCODONE HYDROCHLORIDE 5 MG: 5 TABLET ORAL at 04:11

## 2023-03-29 RX ADMIN — ACETAMINOPHEN 975 MG: 325 TABLET ORAL at 04:11

## 2023-03-29 RX ADMIN — EMPAGLIFLOZIN 10 MG: 10 TABLET, FILM COATED ORAL at 08:00

## 2023-03-29 RX ADMIN — ASPIRIN 81 MG: 81 TABLET, COATED ORAL at 08:00

## 2023-03-29 RX ADMIN — SENNOSIDES AND DOCUSATE SODIUM 1 TABLET: 50; 8.6 TABLET ORAL at 08:00

## 2023-03-29 RX ADMIN — SERTRALINE 200 MG: 100 TABLET, FILM COATED ORAL at 08:00

## 2023-03-29 RX ADMIN — OXYCODONE HYDROCHLORIDE 10 MG: 5 TABLET ORAL at 09:35

## 2023-03-29 RX ADMIN — POLYETHYLENE GLYCOL 3350 17 G: 17 POWDER, FOR SOLUTION ORAL at 08:00

## 2023-03-29 RX ADMIN — PANTOPRAZOLE SODIUM 40 MG: 40 TABLET, DELAYED RELEASE ORAL at 06:30

## 2023-03-29 ASSESSMENT — ACTIVITIES OF DAILY LIVING (ADL)
ADLS_ACUITY_SCORE: 24
ADLS_ACUITY_SCORE: 21

## 2023-03-29 NOTE — PROGRESS NOTES
S Daily Progress Note    Assessment/Plan:  Hypertension:  Blood pressure is borderline low  Continue metoprolol, Aldactone  Holding parameters written for losartan, amlodipine for home     Dyslipidemia  On statin     History of CHF with preserved EF  H/O SVT   Most recent EF 63%  Well compensated   On Aldactone, metoprolol  On Jardiance     Anxiety depression  History of obsessive-compulsive disorder  Continue Abilify, bupropion, Zoloft     History of insomnia  On Ambien as needed     History of iron deficiency anemia  Continue home iron     History of GERD  Continue PPI     History of prediabetes  Follow-up as outpatient     Obstructive sleep apnea status post oral surgery     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  Continue PT OT, pain control, DVT prophylaxis per orthopedic surgery.  Encourage incentive spirometry   hemoglobin at 11.7  Encourage iron rich diet      Principal Problem:    Status post joint replacement     LOS: 1 day     Subjective:  Better.  Denies any complaints.  No  shortness of breath, dizziness, nausea vomiting, chest pain.    acetaminophen  975 mg Oral Q8H     amLODIPine  5 mg Oral QPM     ARIPiprazole  2 mg Oral QPM     aspirin  81 mg Oral BID     atorvastatin  40 mg Oral At Bedtime     buPROPion  200 mg Oral Daily     empagliflozin  10 mg Oral Daily     ferrous sulfate  325 mg Oral QPM     losartan  25 mg Oral QPM     metoprolol succinate ER  100 mg Oral Daily     pantoprazole  40 mg Oral QAM AC     polyethylene glycol  17 g Oral Daily     polyethylene glycol  17 g Oral Daily     polyethylene glycol-propylene glycol PF  1-2 drop Both Eyes TID     senna-docusate  1 tablet Oral BID     sertraline  200 mg Oral Daily     sodium chloride (PF)  3 mL Intracatheter Q8H     spironolactone  25 mg Oral QPM       Objective:  Vital signs in last 24 hours:  Temp:  [97.7  F (36.5  C)-99.1  F (37.3  C)] 99.1  F (37.3  C)  Pulse:  [64-80] 76  Resp:  [13-19] 18  BP: (108-147)/(55-85)  109/64  SpO2:  [94 %-100 %] 98 %  Weight:   [unfilled]    Intake/Output last 3 shifts:  I/O last 3 completed shifts:  In: 1580 [P.O.:480; I.V.:1100]  Out: 1700 [Urine:1700]  Intake/Output this shift:  I/O this shift:  In: 480 [P.O.:480]  Out: -     Review of Systems:   As per subjective, all others negative.    Physical Exam:    General Appearance:  Alert, cooperative, no distress, appears stated age   Head:  Normocephalic, without obvious abnormality, atraumatic   Lungs:   Clear to auscultation bilaterally, respirations unlabored   Chest Wall:  No tenderness or deformity   Heart:  Regular rate and rhythm, S1, S2 normal,no murmur, rub or gallop   Abdomen:   Soft, non tender, non distended, bowel sounds present, no guarding or rigidity   Extremities: LEFT TKA revision   Skin: Skin color, texture, turgor normal, no rashes or lesions   Neurologic: Alert and oriented X 3, Moves all 4 extremities       Lab Results:  Personally Reviewed.   Recent Results (from the past 24 hour(s))   Hemoglobin    Collection Time: 03/29/23  6:58 AM   Result Value Ref Range    Hemoglobin 11.7 11.7 - 15.7 g/dL   Glucose    Collection Time: 03/29/23  6:58 AM   Result Value Ref Range    Glucose 100 70 - 125 mg/dL         Zulma Oliva M.D  [facility]  Internal Medicine

## 2023-03-29 NOTE — PROGRESS NOTES
Physical Therapy Discharge Summary    Reason for therapy discharge:    All goals and outcomes met, no further needs identified.    Progress towards therapy goal(s). See goals on Care Plan in [facility] electronic health record for goal details.  Goals met    Therapy recommendation(s):    Continued therapy is recommended.  Rationale/Recommendations:  OP PT.  Continue home exercise program.

## 2023-03-29 NOTE — PLAN OF CARE
Patient vital signs are at baseline: Yes  Patient able to ambulate as they were prior to admission or with assist devices provided by therapies during their stay:  Yes  Patient MUST void prior to discharge:  Yes  Patient able to tolerate oral intake:  Yes  Pain has adequate pain control using Oral analgesics:  Yes  Does patient have an identified :  Yes  Has goal D/C date and time been discussed with patient:  Yes      Genny Elias RN on 3/28/2023 at 9:21 PM

## 2023-03-29 NOTE — PROGRESS NOTES
"Orthopedic Progress Note      Assessment: 1 Day Post-Op  S/P Procedure(s):  REVISION OF LEFT TOTAL KNEE ARTHROPLASTY     Plan:   - Continue PT/OT  - Anticoagulation: ASA 81 PO BID in addition to SCDs, jossue stockings and early ambulation.  - Antibiotics: 2 weeks oral cefadroxil upon discharge  - Pain Management; continue current regimen  - Diet: regular  - Labs: hgb 11.7, transfuse if <7.0. No indication today  - Weightbearing status: WBAT  - Activity: Up with assist and assistive device until independent.  - Dressing: Keep dry and intact  - Follow-up: Outpatient follow up in 2 weeks  - Disposition: Anticipate discharge home today pending medical clearance      Subjective:  Pain: moderate  Nausea, Vomiting:  No  Lightheadedness, Dizziness:  No  Neuro:  Patient denies new onset numbness or paresthesias  Fever, chills: No  Chest pain: No  SOB: No    Patient reports feeling well today. Patient reports pain is tolerable with current pain regimen. Patient eating and drinking well. Patient voiding and passing gas however no BM. Patient is eager to return home. All questions/concerns answered.      Objective:  /74 (BP Location: Right arm)   Pulse 80   Temp 98.4  F (36.9  C) (Oral)   Resp 18   Ht 1.651 m (5' 5\")   Wt 103.4 kg (227 lb 14.4 oz)   SpO2 98%   BMI 37.92 kg/m      The patient is A&Ox3. Appears comfortable, sitting up at bedside.  Calves without tenderness, neg Ezequiel's  Brisk capillary refill in the toes.   Palpable Left dorsalis pedis pulse. Left foot warm & well-perfused.  Sensation is intact to light touch & equal bilaterally in the femoral, DP, SP & tibial nerve distributions.  ROM: Appropriately flexes & extends all toes bilaterally.   Motor: +5/5 dorsiflexion, plantar flexion & EHL bilaterally.   Leg lengths equal.   Dressing C/D/I without strikethrough, no surrounding erythema.      Pertinent Labs   Lab Results: personally reviewed.   Lab Results   Component Value Date    INR 0.93 11/14/2019    " INR 1.01 09/25/2018     Lab Results   Component Value Date    WBC 7.9 12/16/2022    HGB 11.7 03/29/2023    HCT 41.0 12/16/2022    MCV 93 12/16/2022     12/16/2022     Lab Results   Component Value Date     12/16/2022    CO2 25 12/16/2022         Report completed by:  Aziza Huerta PA-C, MATT  Date: 3/29/2023  Time: 8:36 AM  [facility]

## 2023-03-29 NOTE — PLAN OF CARE
Patient vital signs are at baseline: Yes  Patient able to ambulate as they were prior to admission or with assist devices provided by therapies during their stay:  Yes  Patient MUST void prior to discharge:  Yes  Patient able to tolerate oral intake:  Yes  Pain has adequate pain control using Oral analgesics:  Yes  Does patient have an identified :  Yes  Has goal D/C date and time been discussed with patient:  Yes  Goal Outcome Evaluation:       Pt A/O X 4. CMS intact. VSS. Dressing C/D/I. Ace wraps removed in the morning. PIV saline locked.  Pain controlled with PRN and scheduled pain medications. Potential discharge home today.

## 2023-03-29 NOTE — PLAN OF CARE
Pt cleared for discharge per Ortho & [facility]. Removed PIV, assisted in gathering pt's belongings & reviewed AVS to pt & 's verbalized satisfaction & understanding. Hospital staff escorted pt to main entrance & pt discharged to home. Pt left via private vehicle accompanied by family.     LOY AllenN  Shift: 0700 - [phone]

## 2023-03-29 NOTE — DISCHARGE SUMMARY
ORTHOPEDIC DISCHARGE SUMMARY       Katie Kramer,  1950, MRN [id number]    Admission Date: 3/28/2023  Admission Diagnoses: Failure of total knee arthroplasty (H) [T84.018A, Z96.659]  Status post joint replacement [Z96.60]     Discharge Date:      Post-operative Day:  1 Day Post-Op    Reason for Admission: The patient was admitted for the following:  Procedure(s):  REVISION OF LEFT TOTAL KNEE ARTHROPLASTY      BRIEF HOSPITAL COURSE   This 72 year old female underwent the aforementioned procedure with Dr. Roberson on 3/28/2023. There were no intraoperative complications and the patient was transferred to the recovery room and later the orthopedic unit in stable condition. Once the patient reached the orthopedic floor our orthopedic pain protocol was implemented along with the following:    Anticoagulation Medications: ASA  Therapy: PT and OT  Activity: WBAT  Bracing: None    Consultations during Admission: Hospitalist service for medical management     COMPLICATIONS/SIGNIFICANT FINDINGS    None    DISCHARGE INFORMATION   Condition at discharge: Good  Discharge destination: Home  Patient was seen by myself on the date of discharge.    FOLLOW UP CARE   Follow up with orthopedics in 2 weeks or sooner should the need arise. Ortho will continue to manage pain control, post op anticoagulation and incision care.     Follow up with your PCP for management of chronic medical problems and to evaluate for post op medical complications including constipation, nausea/vomiting, DVT/PE, anemia, changes in blood pressure, fevers/chills, urinary retention and atelectasis/pneumonia.     Subjective   Pain: moderate  Nausea, Vomiting:  No  Lightheadedness, Dizziness:  No  Neuro:  Patient denies new onset numbness or paresthesias  Fever, chills: No  Chest pain: No  SOB: No     Patient reports feeling well today. Patient reports pain is tolerable with current pain regimen. Patient eating and drinking well. Patient voiding and  "passing gas however no BM. Patient is eager to return home. All questions/concerns answered.    Physical Exam   The patient is A&Ox3. Appears comfortable, sitting up at bedside.  Calves without tenderness, neg Ezequiel's  Brisk capillary refill in the toes.   Palpable Left dorsalis pedis pulse. Left foot warm & well-perfused.  Sensation is intact to light touch & equal bilaterally in the femoral, DP, SP & tibial nerve distributions.  ROM: Appropriately flexes & extends all toes bilaterally.   Motor: +5/5 dorsiflexion, plantar flexion & EHL bilaterally.   Leg lengths equal.   Dressing C/D/I without strikethrough, no surrounding erythema.    /64 (BP Location: Right arm)   Pulse 76   Temp 99.1  F (37.3  C) (Oral)   Resp 18   Ht 1.651 m (5' 5\")   Wt 103.4 kg (227 lb 14.4 oz)   SpO2 98%   BMI 37.92 kg/m      Pertinent Results at Discharge     Hemoglobin   Date/Time Value Ref Range Status   03/29/2023 06:58 AM 11.7 11.7 - 15.7 g/dL Final   12/17/2022 05:53 AM 11.5 (L) 11.7 - 15.7 g/dL Final   12/16/2022 08:55 AM 13.1 11.7 - 15.7 g/dL Final     INR   Date/Time Value Ref Range Status   11/14/2019 12:01 AM 0.93 0.90 - 1.10 Final   09/25/2018 10:32 AM 1.01 0.90 - 1.10 Final     Platelet Count   Date/Time Value Ref Range Status   12/16/2022 08:55  150 - 450 10e3/uL Final   06/09/2021 07:47  140 - 440 thou/uL Final   06/08/2021 04:25  140 - 440 thou/uL Final       Problem List   Principal Problem:    Status post joint replacement        Aziza Huerta PA-C  Date: 3/29/2023  Time: 9:58 AM    "

## 2023-09-17 ENCOUNTER — HEALTH MAINTENANCE LETTER (OUTPATIENT)
Age: 73
End: 2023-09-17

## 2024-08-21 ENCOUNTER — LAB REQUISITION (OUTPATIENT)
Dept: LAB | Facility: CLINIC | Age: 74
End: 2024-08-21
Payer: MEDICARE

## 2024-08-21 DIAGNOSIS — R22.42 LOCALIZED SWELLING, MASS AND LUMP, LEFT LOWER LIMB: ICD-10-CM

## 2024-08-21 PROCEDURE — 88304 TISSUE EXAM BY PATHOLOGIST: CPT | Mod: TC,ORL | Performed by: ORTHOPAEDIC SURGERY

## 2024-08-22 LAB
PATH REPORT.COMMENTS IMP SPEC: NORMAL
PATH REPORT.COMMENTS IMP SPEC: NORMAL
PATH REPORT.FINAL DX SPEC: NORMAL
PATH REPORT.GROSS SPEC: NORMAL
PATH REPORT.MICROSCOPIC SPEC OTHER STN: NORMAL
PATH REPORT.RELEVANT HX SPEC: NORMAL
PHOTO IMAGE: NORMAL

## 2024-08-22 PROCEDURE — 88304 TISSUE EXAM BY PATHOLOGIST: CPT | Mod: 26 | Performed by: PATHOLOGY

## 2024-08-29 ENCOUNTER — TRANSFERRED RECORDS (OUTPATIENT)
Dept: MULTI SPECIALTY CLINIC | Facility: CLINIC | Age: 74
End: 2024-08-29
Payer: MEDICARE

## 2024-08-29 LAB
CREATININE (EXTERNAL): 0.88 MG/DL (ref 0.55–1.02)
GFR ESTIMATED (EXTERNAL): >60 ML/MIN/1.73M2
GLUCOSE (EXTERNAL): 96 MG/DL (ref 70–100)
HBA1C MFR BLD: 5.7 %
POTASSIUM (EXTERNAL): 4.6 MMOL/L (ref 3.5–5.1)

## 2024-09-11 RX ORDER — DAPAGLIFLOZIN 10 MG/1
10 TABLET, FILM COATED ORAL DAILY
COMMUNITY
Start: 2024-01-05 | End: 2025-01-04

## 2024-09-15 ENCOUNTER — ANESTHESIA EVENT (OUTPATIENT)
Dept: SURGERY | Facility: CLINIC | Age: 74
End: 2024-09-15
Payer: MEDICARE

## 2024-09-16 ENCOUNTER — APPOINTMENT (OUTPATIENT)
Dept: RADIOLOGY | Facility: CLINIC | Age: 74
End: 2024-09-16
Attending: PHYSICIAN ASSISTANT
Payer: MEDICARE

## 2024-09-16 ENCOUNTER — ANESTHESIA (OUTPATIENT)
Dept: SURGERY | Facility: CLINIC | Age: 74
End: 2024-09-16
Payer: MEDICARE

## 2024-09-16 ENCOUNTER — HOSPITAL ENCOUNTER (OUTPATIENT)
Facility: CLINIC | Age: 74
Discharge: HOME | End: 2024-09-17
Attending: ORTHOPAEDIC SURGERY | Admitting: ORTHOPAEDIC SURGERY
Payer: MEDICARE

## 2024-09-16 DIAGNOSIS — Z98.890 STATUS POST SHOULDER SURGERY: ICD-10-CM

## 2024-09-16 DIAGNOSIS — Z96.612 S/P REVERSE TOTAL SHOULDER ARTHROPLASTY, LEFT: Primary | ICD-10-CM

## 2024-09-16 LAB
ABO/RH(D): NORMAL
ANTIBODY SCREEN: NEGATIVE
APTT PPP: 30 SECONDS (ref 22–38)
CREAT SERPL-MCNC: 0.74 MG/DL (ref 0.51–0.95)
EGFRCR SERPLBLD CKD-EPI 2021: 85 ML/MIN/1.73M2
ERYTHROCYTE [DISTWIDTH] IN BLOOD BY AUTOMATED COUNT: 12.6 % (ref 10–15)
HCT VFR BLD AUTO: 39.9 % (ref 35–47)
HGB BLD-MCNC: 13.3 G/DL (ref 11.7–15.7)
INR PPP: 0.97 (ref 0.85–1.15)
MCH RBC QN AUTO: 29.5 PG (ref 26.5–33)
MCHC RBC AUTO-ENTMCNC: 33.3 G/DL (ref 31.5–36.5)
MCV RBC AUTO: 89 FL (ref 78–100)
PLATELET # BLD AUTO: 383 10E3/UL (ref 150–450)
POTASSIUM SERPL-SCNC: 4.2 MMOL/L (ref 3.4–5.3)
RBC # BLD AUTO: 4.51 10E6/UL (ref 3.8–5.2)
SPECIMEN EXPIRATION DATE: NORMAL
WBC # BLD AUTO: 7 10E3/UL (ref 4–11)

## 2024-09-16 PROCEDURE — 250N000009 HC RX 250: Performed by: NURSE ANESTHETIST, CERTIFIED REGISTERED

## 2024-09-16 PROCEDURE — C1713 ANCHOR/SCREW BN/BN,TIS/BN: HCPCS | Performed by: ORTHOPAEDIC SURGERY

## 2024-09-16 PROCEDURE — C1776 JOINT DEVICE (IMPLANTABLE): HCPCS | Performed by: ORTHOPAEDIC SURGERY

## 2024-09-16 PROCEDURE — 36415 COLL VENOUS BLD VENIPUNCTURE: CPT | Performed by: PHYSICIAN ASSISTANT

## 2024-09-16 PROCEDURE — C9290 INJ, BUPIVACAINE LIPOSOME: HCPCS | Performed by: ANESTHESIOLOGY

## 2024-09-16 PROCEDURE — 250N000011 HC RX IP 250 OP 636: Performed by: STUDENT IN AN ORGANIZED HEALTH CARE EDUCATION/TRAINING PROGRAM

## 2024-09-16 PROCEDURE — 710N000010 HC RECOVERY PHASE 1, LEVEL 2, PER MIN: Performed by: ORTHOPAEDIC SURGERY

## 2024-09-16 PROCEDURE — 250N000013 HC RX MED GY IP 250 OP 250 PS 637: Performed by: PHYSICIAN ASSISTANT

## 2024-09-16 PROCEDURE — 258N000003 HC RX IP 258 OP 636: Performed by: NURSE ANESTHETIST, CERTIFIED REGISTERED

## 2024-09-16 PROCEDURE — 86900 BLOOD TYPING SEROLOGIC ABO: CPT | Performed by: PHYSICIAN ASSISTANT

## 2024-09-16 PROCEDURE — 250N000013 HC RX MED GY IP 250 OP 250 PS 637: Performed by: INTERNAL MEDICINE

## 2024-09-16 PROCEDURE — 250N000011 HC RX IP 250 OP 636: Performed by: PHYSICIAN ASSISTANT

## 2024-09-16 PROCEDURE — 999N000065 XR SHOULDER LEFT PORT G/E 2 VIEWS: Mod: LT

## 2024-09-16 PROCEDURE — 250N000009 HC RX 250: Performed by: ORTHOPAEDIC SURGERY

## 2024-09-16 PROCEDURE — 370N000017 HC ANESTHESIA TECHNICAL FEE, PER MIN: Performed by: ORTHOPAEDIC SURGERY

## 2024-09-16 PROCEDURE — 82565 ASSAY OF CREATININE: CPT | Performed by: PHYSICIAN ASSISTANT

## 2024-09-16 PROCEDURE — 250N000011 HC RX IP 250 OP 636: Performed by: ANESTHESIOLOGY

## 2024-09-16 PROCEDURE — 360N000077 HC SURGERY LEVEL 4, PER MIN: Performed by: ORTHOPAEDIC SURGERY

## 2024-09-16 PROCEDURE — 272N000001 HC OR GENERAL SUPPLY STERILE: Performed by: ORTHOPAEDIC SURGERY

## 2024-09-16 PROCEDURE — 99214 OFFICE O/P EST MOD 30 MIN: CPT | Performed by: INTERNAL MEDICINE

## 2024-09-16 PROCEDURE — 85730 THROMBOPLASTIN TIME PARTIAL: CPT | Performed by: PHYSICIAN ASSISTANT

## 2024-09-16 PROCEDURE — 999N000141 HC STATISTIC PRE-PROCEDURE NURSING ASSESSMENT: Performed by: ORTHOPAEDIC SURGERY

## 2024-09-16 PROCEDURE — 85027 COMPLETE CBC AUTOMATED: CPT | Performed by: PHYSICIAN ASSISTANT

## 2024-09-16 PROCEDURE — 250N000011 HC RX IP 250 OP 636: Performed by: ORTHOPAEDIC SURGERY

## 2024-09-16 PROCEDURE — 258N000003 HC RX IP 258 OP 636: Performed by: STUDENT IN AN ORGANIZED HEALTH CARE EDUCATION/TRAINING PROGRAM

## 2024-09-16 PROCEDURE — 250N000011 HC RX IP 250 OP 636: Performed by: NURSE ANESTHETIST, CERTIFIED REGISTERED

## 2024-09-16 PROCEDURE — 250N000013 HC RX MED GY IP 250 OP 250 PS 637: Performed by: STUDENT IN AN ORGANIZED HEALTH CARE EDUCATION/TRAINING PROGRAM

## 2024-09-16 PROCEDURE — 85610 PROTHROMBIN TIME: CPT | Performed by: PHYSICIAN ASSISTANT

## 2024-09-16 PROCEDURE — 84132 ASSAY OF SERUM POTASSIUM: CPT | Performed by: PHYSICIAN ASSISTANT

## 2024-09-16 PROCEDURE — 258N000003 HC RX IP 258 OP 636: Performed by: PHYSICIAN ASSISTANT

## 2024-09-16 DEVICE — SCREW CENTRAL 6.5X30MM DWJ130: Type: IMPLANTABLE DEVICE | Site: SHOULDER | Status: FUNCTIONAL

## 2024-09-16 DEVICE — IMPLANTABLE DEVICE: Type: IMPLANTABLE DEVICE | Site: SHOULDER | Status: FUNCTIONAL

## 2024-09-16 DEVICE — SCREW PERIPHERAL 14MM DWJ314: Type: IMPLANTABLE DEVICE | Site: SHOULDER | Status: FUNCTIONAL

## 2024-09-16 DEVICE — SCREW PERIPHERAL 18MM DWJ318: Type: IMPLANTABLE DEVICE | Site: SHOULDER | Status: FUNCTIONAL

## 2024-09-16 DEVICE — IMPLANTABLE DEVICE
Type: IMPLANTABLE DEVICE | Site: SHOULDER | Status: FUNCTIONAL
Brand: TORNIER PERFORM® REVERSED AUGMENTED GLENOID

## 2024-09-16 DEVICE — SCREW PERIPHERAL 26MM: Type: IMPLANTABLE DEVICE | Site: SHOULDER | Status: FUNCTIONAL

## 2024-09-16 RX ORDER — ONDANSETRON 2 MG/ML
INJECTION INTRAMUSCULAR; INTRAVENOUS PRN
Status: DISCONTINUED | OUTPATIENT
Start: 2024-09-16 | End: 2024-09-16

## 2024-09-16 RX ORDER — LIDOCAINE HYDROCHLORIDE 10 MG/ML
INJECTION, SOLUTION INFILTRATION; PERINEURAL PRN
Status: DISCONTINUED | OUTPATIENT
Start: 2024-09-16 | End: 2024-09-16

## 2024-09-16 RX ORDER — SODIUM CHLORIDE, SODIUM LACTATE, POTASSIUM CHLORIDE, CALCIUM CHLORIDE 600; 310; 30; 20 MG/100ML; MG/100ML; MG/100ML; MG/100ML
INJECTION, SOLUTION INTRAVENOUS CONTINUOUS
Status: DISCONTINUED | OUTPATIENT
Start: 2024-09-16 | End: 2024-09-16 | Stop reason: HOSPADM

## 2024-09-16 RX ORDER — DEXAMETHASONE SODIUM PHOSPHATE 10 MG/ML
4 INJECTION, SOLUTION INTRAMUSCULAR; INTRAVENOUS
Status: CANCELLED | OUTPATIENT
Start: 2024-09-16

## 2024-09-16 RX ORDER — HYDROMORPHONE HCL IN WATER/PF 6 MG/30 ML
0.2 PATIENT CONTROLLED ANALGESIA SYRINGE INTRAVENOUS EVERY 5 MIN PRN
Status: DISCONTINUED | OUTPATIENT
Start: 2024-09-16 | End: 2024-09-16 | Stop reason: HOSPADM

## 2024-09-16 RX ORDER — ACETAMINOPHEN 325 MG/1
650 TABLET ORAL EVERY 4 HOURS PRN
Status: DISCONTINUED | OUTPATIENT
Start: 2024-09-19 | End: 2024-09-17 | Stop reason: HOSPADM

## 2024-09-16 RX ORDER — ASPIRIN 81 MG/1
81 TABLET ORAL EVERY EVENING
Status: ON HOLD | COMMUNITY
End: 2024-09-16

## 2024-09-16 RX ORDER — ATORVASTATIN CALCIUM 40 MG/1
40 TABLET, FILM COATED ORAL AT BEDTIME
Status: DISCONTINUED | OUTPATIENT
Start: 2024-09-16 | End: 2024-09-17 | Stop reason: HOSPADM

## 2024-09-16 RX ORDER — BUPIVACAINE HYDROCHLORIDE 5 MG/ML
INJECTION, SOLUTION EPIDURAL; INTRACAUDAL
Status: COMPLETED | OUTPATIENT
Start: 2024-09-16 | End: 2024-09-16

## 2024-09-16 RX ORDER — FENTANYL CITRATE 50 UG/ML
50 INJECTION, SOLUTION INTRAMUSCULAR; INTRAVENOUS EVERY 5 MIN PRN
Status: DISCONTINUED | OUTPATIENT
Start: 2024-09-16 | End: 2024-09-16 | Stop reason: HOSPADM

## 2024-09-16 RX ORDER — CEFAZOLIN SODIUM/WATER 2 G/20 ML
2 SYRINGE (ML) INTRAVENOUS
Status: COMPLETED | OUTPATIENT
Start: 2024-09-16 | End: 2024-09-16

## 2024-09-16 RX ORDER — METOPROLOL SUCCINATE 100 MG/1
100 TABLET, EXTENDED RELEASE ORAL DAILY
Status: DISCONTINUED | OUTPATIENT
Start: 2024-09-17 | End: 2024-09-17 | Stop reason: HOSPADM

## 2024-09-16 RX ORDER — NALOXONE HYDROCHLORIDE 0.4 MG/ML
0.1 INJECTION, SOLUTION INTRAMUSCULAR; INTRAVENOUS; SUBCUTANEOUS
Status: CANCELLED | OUTPATIENT
Start: 2024-09-16

## 2024-09-16 RX ORDER — VANCOMYCIN HYDROCHLORIDE 1 G/20ML
INJECTION, POWDER, LYOPHILIZED, FOR SOLUTION INTRAVENOUS
Status: DISCONTINUED
Start: 2024-09-16 | End: 2024-09-16 | Stop reason: HOSPADM

## 2024-09-16 RX ORDER — BUPROPION HYDROCHLORIDE 100 MG/1
200 TABLET, EXTENDED RELEASE ORAL DAILY
Status: DISCONTINUED | OUTPATIENT
Start: 2024-09-17 | End: 2024-09-17 | Stop reason: HOSPADM

## 2024-09-16 RX ORDER — LOSARTAN POTASSIUM 25 MG/1
25 TABLET ORAL EVERY EVENING
Status: DISCONTINUED | OUTPATIENT
Start: 2024-09-16 | End: 2024-09-17 | Stop reason: HOSPADM

## 2024-09-16 RX ORDER — HYDROXYZINE HYDROCHLORIDE 10 MG/1
10-20 TABLET, FILM COATED ORAL EVERY 4 HOURS PRN
Qty: 20 TABLET | Refills: 0 | Status: SHIPPED | OUTPATIENT
Start: 2024-09-16

## 2024-09-16 RX ORDER — NALOXONE HYDROCHLORIDE 0.4 MG/ML
0.2 INJECTION, SOLUTION INTRAMUSCULAR; INTRAVENOUS; SUBCUTANEOUS
Status: DISCONTINUED | OUTPATIENT
Start: 2024-09-16 | End: 2024-09-17 | Stop reason: HOSPADM

## 2024-09-16 RX ORDER — OXYCODONE HYDROCHLORIDE 5 MG/1
5-10 TABLET ORAL EVERY 4 HOURS PRN
Qty: 20 TABLET | Refills: 0 | Status: SHIPPED | OUTPATIENT
Start: 2024-09-16

## 2024-09-16 RX ORDER — PROCHLORPERAZINE MALEATE 5 MG
5 TABLET ORAL EVERY 6 HOURS PRN
Status: DISCONTINUED | OUTPATIENT
Start: 2024-09-16 | End: 2024-09-17 | Stop reason: HOSPADM

## 2024-09-16 RX ORDER — OXYCODONE HYDROCHLORIDE 5 MG/1
10 TABLET ORAL EVERY 4 HOURS PRN
Status: DISCONTINUED | OUTPATIENT
Start: 2024-09-16 | End: 2024-09-17 | Stop reason: HOSPADM

## 2024-09-16 RX ORDER — AMLODIPINE BESYLATE 5 MG/1
5 TABLET ORAL EVERY EVENING
Status: DISCONTINUED | OUTPATIENT
Start: 2024-09-16 | End: 2024-09-17 | Stop reason: HOSPADM

## 2024-09-16 RX ORDER — ARIPIPRAZOLE 2 MG/1
2 TABLET ORAL EVERY EVENING
Status: DISCONTINUED | OUTPATIENT
Start: 2024-09-16 | End: 2024-09-17 | Stop reason: HOSPADM

## 2024-09-16 RX ORDER — ONDANSETRON 4 MG/1
4 TABLET, ORALLY DISINTEGRATING ORAL EVERY 6 HOURS PRN
Status: DISCONTINUED | OUTPATIENT
Start: 2024-09-16 | End: 2024-09-17 | Stop reason: HOSPADM

## 2024-09-16 RX ORDER — DIPHENHYDRAMINE HCL 12.5 MG/5ML
12.5 SOLUTION ORAL EVERY 6 HOURS PRN
Status: DISCONTINUED | OUTPATIENT
Start: 2024-09-16 | End: 2024-09-17 | Stop reason: HOSPADM

## 2024-09-16 RX ORDER — OXYCODONE HYDROCHLORIDE 5 MG/1
5 TABLET ORAL EVERY 4 HOURS PRN
Status: DISCONTINUED | OUTPATIENT
Start: 2024-09-16 | End: 2024-09-17 | Stop reason: HOSPADM

## 2024-09-16 RX ORDER — CEFAZOLIN SODIUM/WATER 2 G/20 ML
2 SYRINGE (ML) INTRAVENOUS SEE ADMIN INSTRUCTIONS
Status: DISCONTINUED | OUTPATIENT
Start: 2024-09-16 | End: 2024-09-16 | Stop reason: HOSPADM

## 2024-09-16 RX ORDER — CEFAZOLIN SODIUM 2 G/100ML
2 INJECTION, SOLUTION INTRAVENOUS EVERY 8 HOURS
Status: COMPLETED | OUTPATIENT
Start: 2024-09-16 | End: 2024-09-16

## 2024-09-16 RX ORDER — PANTOPRAZOLE SODIUM 40 MG/1
40 TABLET, DELAYED RELEASE ORAL
Status: DISCONTINUED | OUTPATIENT
Start: 2024-09-17 | End: 2024-09-17 | Stop reason: HOSPADM

## 2024-09-16 RX ORDER — ONDANSETRON 2 MG/ML
4 INJECTION INTRAMUSCULAR; INTRAVENOUS EVERY 30 MIN PRN
Status: DISCONTINUED | OUTPATIENT
Start: 2024-09-16 | End: 2024-09-16 | Stop reason: HOSPADM

## 2024-09-16 RX ORDER — NALOXONE HYDROCHLORIDE 0.4 MG/ML
0.1 INJECTION, SOLUTION INTRAMUSCULAR; INTRAVENOUS; SUBCUTANEOUS
Status: DISCONTINUED | OUTPATIENT
Start: 2024-09-16 | End: 2024-09-16 | Stop reason: HOSPADM

## 2024-09-16 RX ORDER — OXYCODONE HYDROCHLORIDE 5 MG/1
5 TABLET ORAL
Status: CANCELLED | OUTPATIENT
Start: 2024-09-16

## 2024-09-16 RX ORDER — HYDROMORPHONE HCL IN WATER/PF 6 MG/30 ML
0.4 PATIENT CONTROLLED ANALGESIA SYRINGE INTRAVENOUS EVERY 5 MIN PRN
Status: DISCONTINUED | OUTPATIENT
Start: 2024-09-16 | End: 2024-09-16 | Stop reason: HOSPADM

## 2024-09-16 RX ORDER — POLYETHYLENE GLYCOL 3350 17 G/17G
17 POWDER, FOR SOLUTION ORAL DAILY
Status: DISCONTINUED | OUTPATIENT
Start: 2024-09-17 | End: 2024-09-17 | Stop reason: HOSPADM

## 2024-09-16 RX ORDER — VANCOMYCIN HYDROCHLORIDE 1 G/20ML
INJECTION, POWDER, LYOPHILIZED, FOR SOLUTION INTRAVENOUS PRN
Status: DISCONTINUED | OUTPATIENT
Start: 2024-09-16 | End: 2024-09-16 | Stop reason: HOSPADM

## 2024-09-16 RX ORDER — DEXAMETHASONE SODIUM PHOSPHATE 10 MG/ML
INJECTION, SOLUTION INTRAMUSCULAR; INTRAVENOUS PRN
Status: DISCONTINUED | OUTPATIENT
Start: 2024-09-16 | End: 2024-09-16

## 2024-09-16 RX ORDER — ZOLPIDEM TARTRATE 5 MG/1
5 TABLET ORAL
Status: DISCONTINUED | OUTPATIENT
Start: 2024-09-16 | End: 2024-09-17 | Stop reason: HOSPADM

## 2024-09-16 RX ORDER — ACETAMINOPHEN 325 MG/1
975 TABLET ORAL EVERY 8 HOURS
Status: DISCONTINUED | OUTPATIENT
Start: 2024-09-16 | End: 2024-09-17 | Stop reason: HOSPADM

## 2024-09-16 RX ORDER — AMOXICILLIN 250 MG
1 CAPSULE ORAL 2 TIMES DAILY
Status: DISCONTINUED | OUTPATIENT
Start: 2024-09-16 | End: 2024-09-17 | Stop reason: HOSPADM

## 2024-09-16 RX ORDER — BISACODYL 10 MG
10 SUPPOSITORY, RECTAL RECTAL DAILY PRN
Status: DISCONTINUED | OUTPATIENT
Start: 2024-09-16 | End: 2024-09-17 | Stop reason: HOSPADM

## 2024-09-16 RX ORDER — FENTANYL CITRATE 50 UG/ML
INJECTION, SOLUTION INTRAMUSCULAR; INTRAVENOUS PRN
Status: DISCONTINUED | OUTPATIENT
Start: 2024-09-16 | End: 2024-09-16

## 2024-09-16 RX ORDER — PROPOFOL 10 MG/ML
INJECTION, EMULSION INTRAVENOUS PRN
Status: DISCONTINUED | OUTPATIENT
Start: 2024-09-16 | End: 2024-09-16

## 2024-09-16 RX ORDER — ACETAMINOPHEN 325 MG/1
975 TABLET ORAL ONCE
Status: COMPLETED | OUTPATIENT
Start: 2024-09-16 | End: 2024-09-16

## 2024-09-16 RX ORDER — SPIRONOLACTONE 25 MG/1
25 TABLET ORAL DAILY
Status: DISCONTINUED | OUTPATIENT
Start: 2024-09-17 | End: 2024-09-17 | Stop reason: HOSPADM

## 2024-09-16 RX ORDER — ONDANSETRON 4 MG/1
4 TABLET, ORALLY DISINTEGRATING ORAL EVERY 30 MIN PRN
Status: CANCELLED | OUTPATIENT
Start: 2024-09-16

## 2024-09-16 RX ORDER — FENTANYL CITRATE 50 UG/ML
100 INJECTION, SOLUTION INTRAMUSCULAR; INTRAVENOUS ONCE
Status: COMPLETED | OUTPATIENT
Start: 2024-09-16 | End: 2024-09-16

## 2024-09-16 RX ORDER — OXYCODONE HYDROCHLORIDE 5 MG/1
10 TABLET ORAL
Status: CANCELLED | OUTPATIENT
Start: 2024-09-16

## 2024-09-16 RX ORDER — PROPOFOL 10 MG/ML
INJECTION, EMULSION INTRAVENOUS CONTINUOUS PRN
Status: DISCONTINUED | OUTPATIENT
Start: 2024-09-16 | End: 2024-09-16

## 2024-09-16 RX ORDER — FENTANYL CITRATE 50 UG/ML
25 INJECTION, SOLUTION INTRAMUSCULAR; INTRAVENOUS EVERY 5 MIN PRN
Status: DISCONTINUED | OUTPATIENT
Start: 2024-09-16 | End: 2024-09-16 | Stop reason: HOSPADM

## 2024-09-16 RX ORDER — ONDANSETRON 2 MG/ML
4 INJECTION INTRAMUSCULAR; INTRAVENOUS EVERY 30 MIN PRN
Status: CANCELLED | OUTPATIENT
Start: 2024-09-16

## 2024-09-16 RX ORDER — HYDROXYZINE HYDROCHLORIDE 10 MG/1
10 TABLET, FILM COATED ORAL EVERY 6 HOURS PRN
Status: DISCONTINUED | OUTPATIENT
Start: 2024-09-16 | End: 2024-09-17 | Stop reason: HOSPADM

## 2024-09-16 RX ORDER — SODIUM CHLORIDE, SODIUM LACTATE, POTASSIUM CHLORIDE, CALCIUM CHLORIDE 600; 310; 30; 20 MG/100ML; MG/100ML; MG/100ML; MG/100ML
INJECTION, SOLUTION INTRAVENOUS CONTINUOUS
Status: DISCONTINUED | OUTPATIENT
Start: 2024-09-16 | End: 2024-09-17 | Stop reason: HOSPADM

## 2024-09-16 RX ORDER — HYDROMORPHONE HCL IN WATER/PF 6 MG/30 ML
0.2 PATIENT CONTROLLED ANALGESIA SYRINGE INTRAVENOUS
Status: DISCONTINUED | OUTPATIENT
Start: 2024-09-16 | End: 2024-09-17 | Stop reason: HOSPADM

## 2024-09-16 RX ORDER — LIDOCAINE 40 MG/G
CREAM TOPICAL
Status: DISCONTINUED | OUTPATIENT
Start: 2024-09-16 | End: 2024-09-16 | Stop reason: HOSPADM

## 2024-09-16 RX ORDER — ACETAMINOPHEN 325 MG/1
975 TABLET ORAL ONCE
Status: DISCONTINUED | OUTPATIENT
Start: 2024-09-16 | End: 2024-09-16 | Stop reason: HOSPADM

## 2024-09-16 RX ORDER — NALOXONE HYDROCHLORIDE 0.4 MG/ML
0.4 INJECTION, SOLUTION INTRAMUSCULAR; INTRAVENOUS; SUBCUTANEOUS
Status: DISCONTINUED | OUTPATIENT
Start: 2024-09-16 | End: 2024-09-17 | Stop reason: HOSPADM

## 2024-09-16 RX ORDER — NAPROXEN 250 MG/1
250 TABLET ORAL EVERY 12 HOURS PRN
Status: DISCONTINUED | OUTPATIENT
Start: 2024-09-16 | End: 2024-09-17 | Stop reason: HOSPADM

## 2024-09-16 RX ORDER — DEXAMETHASONE SODIUM PHOSPHATE 4 MG/ML
4 INJECTION, SOLUTION INTRA-ARTICULAR; INTRALESIONAL; INTRAMUSCULAR; INTRAVENOUS; SOFT TISSUE
Status: DISCONTINUED | OUTPATIENT
Start: 2024-09-16 | End: 2024-09-16 | Stop reason: HOSPADM

## 2024-09-16 RX ORDER — TRANEXAMIC ACID 650 MG/1
1950 TABLET ORAL ONCE
Status: COMPLETED | OUTPATIENT
Start: 2024-09-16 | End: 2024-09-16

## 2024-09-16 RX ORDER — MAGNESIUM HYDROXIDE 1200 MG/15ML
LIQUID ORAL PRN
Status: DISCONTINUED | OUTPATIENT
Start: 2024-09-16 | End: 2024-09-16 | Stop reason: HOSPADM

## 2024-09-16 RX ORDER — ASPIRIN 81 MG/1
81 TABLET ORAL 2 TIMES DAILY WITH MEALS
Qty: 60 TABLET | Refills: 0 | Status: SHIPPED | OUTPATIENT
Start: 2024-09-16

## 2024-09-16 RX ORDER — ONDANSETRON 2 MG/ML
4 INJECTION INTRAMUSCULAR; INTRAVENOUS EVERY 6 HOURS PRN
Status: DISCONTINUED | OUTPATIENT
Start: 2024-09-16 | End: 2024-09-17 | Stop reason: HOSPADM

## 2024-09-16 RX ORDER — HYDROMORPHONE HCL IN WATER/PF 6 MG/30 ML
0.4 PATIENT CONTROLLED ANALGESIA SYRINGE INTRAVENOUS
Status: DISCONTINUED | OUTPATIENT
Start: 2024-09-16 | End: 2024-09-17 | Stop reason: HOSPADM

## 2024-09-16 RX ORDER — ONDANSETRON 4 MG/1
4 TABLET, ORALLY DISINTEGRATING ORAL EVERY 30 MIN PRN
Status: DISCONTINUED | OUTPATIENT
Start: 2024-09-16 | End: 2024-09-16 | Stop reason: HOSPADM

## 2024-09-16 RX ORDER — SERTRALINE HYDROCHLORIDE 100 MG/1
200 TABLET, FILM COATED ORAL DAILY
Status: DISCONTINUED | OUTPATIENT
Start: 2024-09-17 | End: 2024-09-17 | Stop reason: HOSPADM

## 2024-09-16 RX ORDER — DAPAGLIFLOZIN 5 MG/1
10 TABLET, FILM COATED ORAL DAILY
Status: DISCONTINUED | OUTPATIENT
Start: 2024-09-17 | End: 2024-09-17 | Stop reason: HOSPADM

## 2024-09-16 RX ORDER — VANCOMYCIN HYDROCHLORIDE 1 G/20ML
1 INJECTION, POWDER, LYOPHILIZED, FOR SOLUTION INTRAVENOUS ONCE
Status: DISCONTINUED | OUTPATIENT
Start: 2024-09-16 | End: 2024-09-16 | Stop reason: HOSPADM

## 2024-09-16 RX ORDER — KETAMINE HYDROCHLORIDE 10 MG/ML
INJECTION INTRAMUSCULAR; INTRAVENOUS PRN
Status: DISCONTINUED | OUTPATIENT
Start: 2024-09-16 | End: 2024-09-16

## 2024-09-16 RX ORDER — LIDOCAINE 40 MG/G
CREAM TOPICAL
Status: DISCONTINUED | OUTPATIENT
Start: 2024-09-16 | End: 2024-09-17 | Stop reason: HOSPADM

## 2024-09-16 RX ORDER — ASPIRIN 81 MG/1
81 TABLET ORAL 2 TIMES DAILY
Status: DISCONTINUED | OUTPATIENT
Start: 2024-09-16 | End: 2024-09-17 | Stop reason: HOSPADM

## 2024-09-16 RX ORDER — MAGNESIUM SULFATE 4 G/50ML
4 INJECTION INTRAVENOUS ONCE
Status: COMPLETED | OUTPATIENT
Start: 2024-09-16 | End: 2024-09-16

## 2024-09-16 RX ADMIN — ONDANSETRON 4 MG: 2 INJECTION INTRAMUSCULAR; INTRAVENOUS at 08:59

## 2024-09-16 RX ADMIN — AMLODIPINE BESYLATE 5 MG: 5 TABLET ORAL at 20:50

## 2024-09-16 RX ADMIN — LIDOCAINE HYDROCHLORIDE 5 ML: 10 INJECTION, SOLUTION INFILTRATION; PERINEURAL at 07:29

## 2024-09-16 RX ADMIN — PROPOFOL 200 MG: 10 INJECTION, EMULSION INTRAVENOUS at 07:29

## 2024-09-16 RX ADMIN — FENTANYL CITRATE 50 MCG: 50 INJECTION, SOLUTION INTRAMUSCULAR; INTRAVENOUS at 06:52

## 2024-09-16 RX ADMIN — SENNOSIDES AND DOCUSATE SODIUM 1 TABLET: 50; 8.6 TABLET ORAL at 13:28

## 2024-09-16 RX ADMIN — PROPOFOL 125 MCG/KG/MIN: 10 INJECTION, EMULSION INTRAVENOUS at 07:30

## 2024-09-16 RX ADMIN — LOSARTAN POTASSIUM 25 MG: 25 TABLET, FILM COATED ORAL at 20:50

## 2024-09-16 RX ADMIN — BUPIVACAINE 10 ML: 13.3 INJECTION, SUSPENSION, LIPOSOMAL INFILTRATION at 06:55

## 2024-09-16 RX ADMIN — CEFAZOLIN SODIUM 2 G: 2 INJECTION, SOLUTION INTRAVENOUS at 13:28

## 2024-09-16 RX ADMIN — ACETAMINOPHEN 975 MG: 325 TABLET ORAL at 13:27

## 2024-09-16 RX ADMIN — TRANEXAMIC ACID 1950 MG: 650 TABLET ORAL at 06:05

## 2024-09-16 RX ADMIN — PHENYLEPHRINE HYDROCHLORIDE 0.3 MCG/KG/MIN: 10 INJECTION INTRAVENOUS at 07:40

## 2024-09-16 RX ADMIN — OXYCODONE HYDROCHLORIDE 5 MG: 5 TABLET ORAL at 10:58

## 2024-09-16 RX ADMIN — MIDAZOLAM HYDROCHLORIDE 1 MG: 1 INJECTION, SOLUTION INTRAMUSCULAR; INTRAVENOUS at 06:52

## 2024-09-16 RX ADMIN — PHENYLEPHRINE HYDROCHLORIDE 200 MCG: 10 INJECTION INTRAVENOUS at 07:40

## 2024-09-16 RX ADMIN — SODIUM CHLORIDE, POTASSIUM CHLORIDE, SODIUM LACTATE AND CALCIUM CHLORIDE: 600; 310; 30; 20 INJECTION, SOLUTION INTRAVENOUS at 06:36

## 2024-09-16 RX ADMIN — ATORVASTATIN CALCIUM 40 MG: 40 TABLET, FILM COATED ORAL at 20:51

## 2024-09-16 RX ADMIN — KETAMINE HYDROCHLORIDE 30 MG: 10 INJECTION INTRAMUSCULAR; INTRAVENOUS at 08:35

## 2024-09-16 RX ADMIN — BUPIVACAINE HYDROCHLORIDE 10 ML: 5 INJECTION, SOLUTION EPIDURAL; INTRACAUDAL; PERINEURAL at 06:55

## 2024-09-16 RX ADMIN — ACETAMINOPHEN 975 MG: 325 TABLET ORAL at 22:23

## 2024-09-16 RX ADMIN — ASPIRIN 81 MG: 81 TABLET, COATED ORAL at 20:51

## 2024-09-16 RX ADMIN — ROCURONIUM BROMIDE 70 MG: 10 INJECTION, SOLUTION INTRAVENOUS at 07:29

## 2024-09-16 RX ADMIN — SENNOSIDES AND DOCUSATE SODIUM 1 TABLET: 50; 8.6 TABLET ORAL at 20:50

## 2024-09-16 RX ADMIN — MAGNESIUM SULFATE HEPTAHYDRATE 4 G: 80 INJECTION, SOLUTION INTRAVENOUS at 06:40

## 2024-09-16 RX ADMIN — SODIUM CHLORIDE, POTASSIUM CHLORIDE, SODIUM LACTATE AND CALCIUM CHLORIDE: 600; 310; 30; 20 INJECTION, SOLUTION INTRAVENOUS at 18:55

## 2024-09-16 RX ADMIN — Medication 2 G: at 07:25

## 2024-09-16 RX ADMIN — HYDROXYZINE HYDROCHLORIDE 10 MG: 10 TABLET, FILM COATED ORAL at 10:58

## 2024-09-16 RX ADMIN — FENTANYL CITRATE 50 MCG: 50 INJECTION INTRAMUSCULAR; INTRAVENOUS at 07:29

## 2024-09-16 RX ADMIN — DEXAMETHASONE SODIUM PHOSPHATE 10 MG: 10 INJECTION, SOLUTION INTRAMUSCULAR; INTRAVENOUS at 07:40

## 2024-09-16 RX ADMIN — ASPIRIN 81 MG: 81 TABLET, COATED ORAL at 13:27

## 2024-09-16 RX ADMIN — ACETAMINOPHEN 975 MG: 325 TABLET ORAL at 06:05

## 2024-09-16 RX ADMIN — SUGAMMADEX 200 MG: 100 INJECTION, SOLUTION INTRAVENOUS at 08:59

## 2024-09-16 RX ADMIN — CEFAZOLIN SODIUM 2 G: 2 INJECTION, SOLUTION INTRAVENOUS at 22:23

## 2024-09-16 ASSESSMENT — ACTIVITIES OF DAILY LIVING (ADL)
ADLS_ACUITY_SCORE: 24
ADLS_ACUITY_SCORE: 23
ADLS_ACUITY_SCORE: 25
ADLS_ACUITY_SCORE: 24
ADLS_ACUITY_SCORE: 24
ADLS_ACUITY_SCORE: 25
ADLS_ACUITY_SCORE: 23
ADLS_ACUITY_SCORE: 24
ADLS_ACUITY_SCORE: 23
ADLS_ACUITY_SCORE: 25
ADLS_ACUITY_SCORE: 23
ADLS_ACUITY_SCORE: 25

## 2024-09-16 ASSESSMENT — ENCOUNTER SYMPTOMS
DYSRHYTHMIAS: 1
SEIZURES: 0

## 2024-09-16 NOTE — INTERVAL H&P NOTE
"I have reviewed the surgical (or preoperative) H&P that is linked to this encounter, and examined the patient. There are no significant changes    Clinical Conditions Present on Arrival:  Clinically Significant Risk Factors Present on Admission                 # Drug Induced Platelet Defect: home medication list includes an antiplatelet medication      # Obesity: Estimated body mass index is 38.51 kg/m  as calculated from the following:    Height as of this encounter: 1.664 m (5' 5.5\").    Weight as of this encounter: 106.6 kg (235 lb).       "

## 2024-09-16 NOTE — ANESTHESIA PREPROCEDURE EVALUATION
Anesthesia Pre-Procedure Evaluation    Patient: Katie Kramer   MRN: [id number] : 1950        Procedure : Procedure(s):  LEFT REVERSE TOTAL SHOULDER ARTHROPLASTY          Past Medical History:   Diagnosis Date    Anemia     Anxiety     Arthritis     Chronic fatigue     Congestive heart failure (H)     Depression     Eosinophilic esophagitis     Esophageal dysmotility     GERD (gastroesophageal reflux disease)     History of anesthesia complications     Hypercalcemia     Treated via parathyroidectomy x1    Hypertension     Irregular heart beat     Macular degeneration     Motion sickness     Obese     OCD (obsessive compulsive disorder)     Perspiration excessive     PONV (postoperative nausea and vomiting)     Rosacea     Sleep apnea     uses mouthguard    Uncomplicated asthma       Past Surgical History:   Procedure Laterality Date    ARTHROPLASTY REVISION KNEE Right 2022    Procedure: REVISION OF RIGHT TOTAL KNEE ARTHROPLASTY;  Surgeon: James Roberson MD;  Location: [facility] Main OR    ARTHROPLASTY REVISION KNEE Left 2023    Procedure: REVISION OF LEFT TOTAL KNEE ARTHROPLASTY;  Surgeon: James Roberson MD;  Location: [facility] Main OR    ARTHROSCOPY KNEE Right     BACK SURGERY      cervical fusion    BLEPHAROPLASTY Bilateral     BUNIONECTOMY Bilateral     CARPAL TUNNEL RELEASE RT/LT      knee replacement    CV CORONARY ANGIOGRAM N/A 2021    Procedure: Coronary Angiogram;  Surgeon: Oly Woody MD;  Location: [facility] Cardiac Cath Lab;  Service: Cardiology    CV LEFT HEART CATHETERIZATION WITHOUT LEFT VENTRICULOGRAM Left 2021    Procedure: Left Heart Catheterization Without Left Ventriculogram;  Surgeon: Oly Woody MD;  Location: [facility] Cardiac Cath Lab;  Service: Cardiology    DILATION AND CURETTAGE      GYN SURGERY      hysterectomy    HYSTERECTOMY      Right ovary remains    JOINT REPLACEMENT      OTHER SURGICAL HISTORY      Hammertoe    PARATHYROIDECTOMY       PARATHYROIDECTOMY      Partial x1    RELEASE CARPAL TUNNEL Bilateral     UVULOPALATOPHARYNGOPLASTY      Z TOTAL KNEE ARTHROPLASTY Left 09/25/2018    Procedure: LEFT TOTAL KNEE ARTHROPLASTY;  Surgeon: Osito Harrell MD;  Location: [facility] OR;  Service: Orthopedics    C TOTAL KNEE ARTHROPLASTY Right 11/27/2018    Procedure: RIGHT TOTAL KNEE ARTHROPLASTY;  Surgeon: Osito Harrell MD;  Location: [facility];  Service: Orthopedics      Allergies   Allergen Reactions    Adhesive Tape     Animal Dander     Morphine Nausea    Seasonal Allergies     Latex Rash     sensitivity      Social History     Tobacco Use    Smoking status: Never    Smokeless tobacco: Never   Substance Use Topics    Alcohol use: No      Wt Readings from Last 1 Encounters:   09/16/24 106.6 kg (235 lb)        Anesthesia Evaluation    Type: General and Regional.    No history of anesthetic complications       ROS/MED HX  ENT/Pulmonary:  - neg pulmonary ROS   (+) sleep apnea,                     asthma  Treatment: Inhaler prn,                 Neurologic:  - neg neurologic ROS  (-) no seizures and no CVA   Cardiovascular:  - neg cardiovascular ROS   (+)  hypertension- -  CAD (mild noted on [facility] in 2021) -  - -      CHF etiology: diastolic Last EF: 65% date: 11/23/2022               dysrhythmias (SVT),  Irregular Heartbeat/Palpitations,       Previous cardiac testing   Echo: Date: 11/23/2022 Results:  Final Impressions   1. This study was performed per research protocol IRB # [id number].   2. Normal left ventricular chamber size. Calculated ejection fraction 65%.   3. No regional wall motion abnormalities.   4. Normal right ventricular chamber size and systolic function.   5. Estimated right ventricular systolic pressure 25 mmHg (right atrial pressure of 5 mmHg).     Stress Test:  Date: Results:    ECG Reviewed:  Date: 11/23/2022 Results:  Sinus rhythm   Normal ECG     Cath:  Date: Results:      METS/Exercise Tolerance: >4 METS   "  Hematologic:  - neg hematologic  ROS     Musculoskeletal:  - neg musculoskeletal ROS     GI/Hepatic:  - neg GI/hepatic ROS   (+) GERD,  esophageal disease (Achalasia),                 Renal/Genitourinary:  - neg Renal ROS     Endo:  - neg endo ROS   (+)               Obesity,       Psychiatric/Substance Use:  - neg psychiatric ROS   (+) psychiatric history anxiety and depression       Infectious Disease:  - neg infectious disease ROS     Malignancy:  - neg malignancy ROS     Other:  - neg other ROS          Physical Exam    Airway        Mallampati: III   TM distance: > 3 FB   Neck ROM: full   Mouth opening: > 3 cm    Respiratory Devices and Support         Dental  no notable dental history         Cardiovascular          Rhythm and rate: regular and normal     Pulmonary           breath sounds clear to auscultation           OUTSIDE LABS:  CBC:   Lab Results   Component Value Date    WBC 7.0 09/16/2024    WBC 7.9 12/16/2022    HGB 13.3 09/16/2024    HGB 11.7 03/29/2023    HCT 39.9 09/16/2024    HCT 41.0 12/16/2022     09/16/2024     12/16/2022     BMP:   Lab Results   Component Value Date     12/16/2022     06/09/2021    POTASSIUM 4.2 09/16/2024    POTASSIUM 4.5 12/16/2022    CHLORIDE 105 12/16/2022    CHLORIDE 108 (H) 06/09/2021    CO2 25 12/16/2022    CO2 25 06/09/2021    BUN 15 12/16/2022    BUN 13 06/09/2021    CR 0.74 09/16/2024    CR 0.82 12/16/2022     03/29/2023     (H) 03/28/2023     COAGS:   Lab Results   Component Value Date    PTT 30 09/16/2024    INR 0.97 09/16/2024     POC: No results found for: \"BGM\", \"HCG\", \"HCGS\"  HEPATIC:   Lab Results   Component Value Date    ALBUMIN 3.5 06/08/2021    PROTTOTAL 5.7 (L) 06/08/2021    ALT 16 06/08/2021    AST 14 06/08/2021    ALKPHOS 95 06/08/2021    BILITOTAL 0.4 06/08/2021     OTHER:   Lab Results   Component Value Date    A1C 5.7 (H) 12/16/2022    CLEM 10.0 12/16/2022    MAG 2.2 06/09/2021       Anesthesia Plan    ASA " Status:  3    NPO Status:  NPO Appropriate    Anesthesia Type: General.     - Airway: ETT   Induction: Intravenous, Propofol.   Maintenance: TIVA.        Consents    Anesthesia Plan(s) and associated risks, benefits, and realistic alternatives discussed. Questions answered and patient/representative(s) expressed understanding.     - Discussed: Risks, Benefits and Alternatives for BOTH SEDATION and the PROCEDURE were discussed     - Discussed with:  Patient, Spouse      - Extended Intubation/Ventilatory Support Discussed: No.      - Patient is DNR/DNI Status: No     Use of blood products discussed: No .     Postoperative Care    Pain management: Peripheral nerve block (Single Shot), IV analgesics, Oral pain medications.   PONV prophylaxis: Ondansetron (or other 5HT-3), Dexamethasone or Solumedrol, Background Propofol Infusion     Comments:    Other Comments: Diastolic HF, will be judicious with fluids. General  + interscalene PNB for POP per surgeon request.             Denys Fritz MD

## 2024-09-16 NOTE — CONSULTS
[facility] Medicine Service Consult Note.     Physician requesting consult: Marv Chatman MD  Thank you, Marv Mandujano MD, for asking the [facility] Medicine Service to see Katie Kramer in consultation.    Assessment/Recommendations   Assessment/Plan: 74-year-old female with left shoulder degenerative joint disease and rotator cuff repair status post left reverse total shoulder arthroplasty and open biceps tenodesis on 9/16/2024.  Hospital medicine was consulted for below medical problems.    Chronic heart failure with preserved ejection fraction  Expectant management  Careful I's and O's Daily weights.  PTA medication: Spironolactone 25 mg daily with hold parameters, dapaglifozen 10 mg daily.  Repeat creatinine and potassium level in AM.    Hypertension, essential  PTA medication: Amlodipine 5 mg daily, losartan 25 mg daily, metoprolol  mg daily  - order creatinine and potassium level for AM  - Hold parameters written, as patient is high risk of post operative hypotension.      Mixed hyperlipidemia  PTA medication: Atorvastatin 40 mg at bedtime    Obstructive sleep apnea  Does not utilize CPAP at home.   Uses soft palate splint.   Continue postoperative oximetry monitoring.     Anxiety and depression  Insomnia  Abilify 2 mg daily, bupropion 400 mg daily, sertraline 200 mg daily, zolpidem 5 mg nightly as needed    Gastroesophageal reflux disease  PTA medication: Zegerid.  Replace with pantoprazole 40 mg daily while inpatient    Mild intermittent asthma  PTA medication: Albuterol inhaler 1 to 2 puffs every 4 hours as needed.    Prediabetes  POCT glucose daily.     History of paroxysmal SVT  Left bundle branch block  Noted on 2/19/2021    Recent UTI treated with Macrobid.    Clinically Significant Risk Factors Present on Admission   # Drug Induced Platelet Defect: home medication list includes an antiplatelet medication   # Hypertension: Noted on problem list     #  "Obesity: Estimated body mass index is 38.51 kg/m  as calculated from the following:    Height as of this encounter: 1.664 m (5' 5.5\").    Weight as of this encounter: 106.6 kg (235 lb).          Code status:Full Code  -Reviewed the patient's preoperative H and P and updated missing elements.  -Home medication reconciliation has been reviewed.      History of Present Illness/Subjective    HPI: Ms. Katie Kramer is a 74 year old female status post Procedure(s):  LEFT REVERSE TOTAL SHOULDER ARTHROPLASTY  Post-operative Day: Day of Surgery, hospital medicine was consulted for Hospitalist Consult: HTN, GERD, KERLINE, CHF, OCD .     HTN. The patient had this problem for >1 years.  Perioperative blood pressures have ranged between 138-149 mmHg systolic.   Associated symptoms as none. They don't occur after anything.   Not taking blood pressure medicine makes it worse. Taking blood pressure medicine makes it better.     Patient denies cerebrovascular accident, myocardial infarction, pulmonary embolism, or deep venous thrombosis.     Estimated Blood Loss:  100 mL    I have reviewed preop physical including past medical hx, family hx, social hx, surgical hx, medication hx.      Physical Examination  Review of Systems   VITALS: BP (!) 142/73 (BP Location: Right arm)   Pulse 77   Temp 98.1  F (36.7  C) (Axillary)   Resp 18   Ht 1.664 m (5' 5.5\")   Wt 106.6 kg (235 lb)   SpO2 95%   BMI 38.51 kg/m    BMI: Body mass index is 38.51 kg/m .  Wt Readings from Last 3 Encounters:   09/16/24 106.6 kg (235 lb)   03/28/23 103.4 kg (227 lb 14.4 oz)   12/17/22 103.9 kg (229 lb 1.6 oz)       Intake/Output Summary (Last 24 hours) at 9/16/2024 1113  Last data filed at 9/16/2024 0921  Gross per 24 hour   Intake 1338.53 ml   Output 100 ml   Net 1238.53 ml     General Appearance:   no acute distress.   ENT/Mouth: membranes moist, no oral lesions or bleeding gums.      EYES:  no scleral icterus, normal conjunctivae   Neck: no carotid bruits " or thyromegaly   Chest/Lungs:   lungs are clear to auscultation, no rales or wheezing, equal chest wall expansion    Cardiovascular:   Regular. Normal S1/S2 heart sounds with no murmurs, rubs, or gallops.   Abdomen:  no organomegaly, masses, bruits, or tenderness; bowel sounds are present   Extremities: no cyanosis or clubbing   Skin: no xanthelasma, warm.    Neurologic: normal  bilateral, no tremors     Psychiatric: alert and oriented x3, calm     A 12 point comprehensive review of systems was negative except as noted above.         Medical History  Surgical History Family History Social History   Patient Active Problem List    Diagnosis Date Noted    S/P reverse total shoulder arthroplasty, left 09/16/2024     Priority: Medium    Status post shoulder surgery 09/16/2024     Priority: Medium    Status post joint replacement 12/16/2022     Priority: Medium    Essential hypertension 12/16/2022     Priority: Medium    Obstructive sleep apnea syndrome in adult 04/22/2019     Priority: Medium     Formatting of this note might be different from the original.  Added automatically from request for surgery [id number]      Anxiety and depression 04/22/2019     Priority: Medium    Esophageal reflux 02/06/2007     Priority: Medium    Past Surgical History:   Procedure Laterality Date    ARTHROPLASTY REVISION KNEE Right 12/16/2022    Procedure: REVISION OF RIGHT TOTAL KNEE ARTHROPLASTY;  Surgeon: James Roberson MD;  Location: [facility] OR    ARTHROPLASTY REVISION KNEE Left 03/28/2023    Procedure: REVISION OF LEFT TOTAL KNEE ARTHROPLASTY;  Surgeon: James Roberson MD;  Location: [facility] OR    ARTHROSCOPY KNEE Right     BACK SURGERY      cervical fusion    BLEPHAROPLASTY Bilateral     BUNIONECTOMY Bilateral     CARPAL TUNNEL RELEASE RT/LT      knee replacement    CV CORONARY ANGIOGRAM N/A 06/08/2021    Procedure: Coronary Angiogram;  Surgeon: Oly Woody MD;  Location: [facility] Cardiac Cath Lab;  Service:  Cardiology    CV LEFT HEART CATHETERIZATION WITHOUT LEFT VENTRICULOGRAM Left 06/08/2021    Procedure: Left Heart Catheterization Without Left Ventriculogram;  Surgeon: Oly Woody MD;  Location: [facility] Cardiac Cath Lab;  Service: Cardiology    DILATION AND CURETTAGE      GYN SURGERY      hysterectomy    HYSTERECTOMY      Right ovary remains    JOINT REPLACEMENT      OTHER SURGICAL HISTORY      Hammertoe    PARATHYROIDECTOMY      PARATHYROIDECTOMY      Partial x1    RELEASE CARPAL TUNNEL Bilateral     UVULOPALATOPHARYNGOPLASTY      [facility] TOTAL KNEE ARTHROPLASTY Left 09/25/2018    Procedure: LEFT TOTAL KNEE ARTHROPLASTY;  Surgeon: Osito Harrell MD;  Location: [facility];  Service: Orthopedics    [facility] TOTAL KNEE ARTHROPLASTY Right 11/27/2018    Procedure: RIGHT TOTAL KNEE ARTHROPLASTY;  Surgeon: Osito Harrell MD;  Location: [facility];  Service: Orthopedics     Reviewed, and family history includes Colon Cancer in her father; Depression in her mother; Hypertension in her father; Lung Cancer in her father; Post Operative Nausea and Vomiting in her mother.     Reviewed, and she  reports that she has never smoked. She has never used smokeless tobacco. She reports that she does not drink alcohol and does not use drugs.  Social History     Tobacco Use    Smoking status: Never    Smokeless tobacco: Never   Substance Use Topics    Alcohol use: No        Medications  Allergies   Medications Prior to Admission   Medication Sig Dispense Refill Last Dose    amLODIPine (NORVASC) 5 MG tablet Take 1 tablet (5 mg) by mouth every evening   9/15/2024 at PM    amoxicillin (AMOXIL) 500 MG capsule Take 2,000 mg by mouth as needed (dental procedures)   Unknown    ARIPiprazole (ABILIFY) 2 MG tablet Take 2 mg by mouth every evening   9/15/2024 at PM    atorvastatin (LIPITOR) 40 MG tablet Take 40 mg by mouth at bedtime.   9/15/2024 at PM    buPROPion (WELLBUTRIN SR) 200 MG 12 hr tablet Take 200 mg by mouth daily    9/16/2024 at AM    calcium carbonate 500 mg, elemental, (OSCAL 500) 1250 (500 Ca) MG TABS tablet Take 1 tablet by mouth 2 times daily   9/8/2024 at PM    dapagliflozin (FARXIGA) 10 MG TABS tablet Take 10 mg by mouth daily.   9/11/2024 at AM    ferrous sulfate (FE TABS) 325 (65 Fe) MG EC tablet Take 325 mg by mouth every evening   9/8/2024 at PM    hydrocortisone 2.5 % ointment Apply topically 2 times daily as needed   Unknown    LORazepam (ATIVAN) 1 MG tablet Take 1 mg by mouth daily as needed (With dental work).   Unknown    losartan (COZAAR) 25 MG tablet Take 1 tablet (25 mg) by mouth every evening   9/15/2024 at PM    metoprolol succinate ER (TOPROL XL) 100 MG 24 hr tablet Take 100 mg by mouth daily   9/16/2024 at AM    metroNIDAZOLE (METROCREAM) 0.75 % external cream Apply topically 2 times daily as needed (rosacea).   Unknown    Multiple Vitamins-Minerals (PRESERVISION/LUTEIN PO) Take 1 tablet by mouth 2 times daily   9/8/2024 at PM    omeprazole-sodium bicarbonate (ZEGERID)  MG per capsule Take 1 capsule by mouth every morning (before breakfast)   9/16/2024 at AM    polyethylene glycol (MIRALAX/GLYCOLAX) powder Take 17 g by mouth nightly as needed   Unknown    Polyethylene Glycol 400 (BLINK TEARS OP) Apply 1 drop to eye 3 times daily as needed (dry eyes).   Unknown    scopolamine (TRANSDERM) 1 MG/3DAYS 72 hr patch Place 1 patch onto the skin every 72 hours as needed (for travel).   Unknown    sertraline (ZOLOFT) 100 MG tablet Take 200 mg by mouth daily   9/16/2024 at AM    spironolactone (ALDACTONE) 25 MG tablet Take 25 mg by mouth daily.   9/15/2024 at AM    Vitamin D, Cholecalciferol, 10 MCG (400 UNIT) TABS Take 800 Units by mouth 2 times daily   9/11/2024 at PM    zolpidem (AMBIEN) 10 MG tablet Take 10 mg by mouth nightly as needed for sleep rare   Unknown    order for DME Equipment being ordered: Mandibular advancement device for treatment of moderate obstructive sleep apnea (AHI 16, PSG 7/16/2017  "at [facility]) 1 Units 0     [DISCONTINUED] aspirin 81 MG EC tablet Take 81 mg by mouth every evening.   9/8/2024 at PM       Allergies   Allergen Reactions    Adhesive Tape     Animal Dander     Morphine Nausea    Seasonal Allergies     Latex Rash     sensitivity         Imaging Reviewed Personally By Myself    Radiology Results:   Recent Results (from the past 24 hour(s))   POC US Guidance Needle Placement    Narrative    Ultrasound was performed as guidance to an anesthesia procedure.  Click   \"PACS images\" hyperlink below to view any stored images.  For specific   procedure details, view procedure note authored by anesthesia.   XR Shoulder Left Port G/E 2 Views    Narrative    EXAM: XR SHOULDER LEFT PORT G/E 2 VIEWS  LOCATION: [facility]  DATE: 9/16/2024    INDICATION: Status post surgery  COMPARISON: None.      Impression    IMPRESSION:     There are immediate postoperative changes from left reverse total shoulder arthroplasty, in standard alignment. No periprosthetic fracture is evident.       Labs Reviewed Personally By Myself     Results for orders placed or performed during the hospital encounter of 09/16/24 (from the past 24 hour(s))   POC US Guidance Needle Placement    Narrative    Ultrasound was performed as guidance to an anesthesia procedure.  Click   \"PACS images\" hyperlink below to view any stored images.  For specific   procedure details, view procedure note authored by anesthesia.   ABO/Rh type and screen    Narrative    The following orders were created for panel order ABO/Rh type and screen.  Procedure                               Abnormality         Status                     ---------                               -----------         ------                     Adult Type and Screen[[id number]]                            Final result                 Please view results for these tests on the individual orders.   CBC with platelets   Result Value Ref Range    WBC " Count 7.0 4.0 - 11.0 10e3/uL    RBC Count 4.51 3.80 - 5.20 10e6/uL    Hemoglobin 13.3 11.7 - 15.7 g/dL    Hematocrit 39.9 35.0 - 47.0 %    MCV 89 78 - 100 fL    MCH 29.5 26.5 - 33.0 pg    MCHC 33.3 31.5 - 36.5 g/dL    RDW 12.6 10.0 - 15.0 %    Platelet Count 383 150 - 450 10e3/uL   Potassium   Result Value Ref Range    Potassium 4.2 3.4 - 5.3 mmol/L   Creatinine   Result Value Ref Range    Creatinine 0.74 0.51 - 0.95 mg/dL    GFR Estimate 85 >60 mL/min/1.73m2   INR   Result Value Ref Range    INR 0.97 0.85 - 1.15   Partial thromboplastin time (PTT)   Result Value Ref Range    aPTT 30 22 - 38 Seconds   Adult Type and Screen   Result Value Ref Range    ABO/RH(D) O POS     Antibody Screen Negative Negative    SPECIMEN EXPIRATION DATE [id number]    XR Shoulder Left Port G/E 2 Views    Narrative    EXAM: XR SHOULDER LEFT PORT G/E 2 VIEWS  LOCATION: [facility]  DATE: 9/16/2024    INDICATION: Status post surgery  COMPARISON: None.      Impression    IMPRESSION:     There are immediate postoperative changes from left reverse total shoulder arthroplasty, in standard alignment. No periprosthetic fracture is evident.       Thank you for this consultation.  Appreciate the opportunity to participate in the care of Katie Kramer, please feel free to contact us for any questions or concerns.    Mark Hurt DO, MS  [facility] Service  Internal Medicine  Phone: #[phone]

## 2024-09-16 NOTE — ANESTHESIA PROCEDURE NOTES
Brachial plexus Procedure Note    Pre-Procedure   Staff -        Anesthesiologist:  Denys Fritz MD       Performed By: anesthesiologist       Location: pre-op       Procedure Start/Stop Times: 9/16/2024 6:55 AM and 9/16/2024 6:59 AM       Pre-Anesthestic Checklist: patient identified, IV checked, site marked, risks and benefits discussed, informed consent, monitors and equipment checked, pre-op evaluation, at physician/surgeon's request and post-op pain management  Timeout:       Correct Patient: Yes        Correct Procedure: Yes        Correct Site: Yes        Correct Position: Yes        Correct Laterality: Yes        Site Marked: Yes  Procedure Documentation  Procedure: Brachial plexus       Laterality: left       Patient Position: supine       Patient Prep/Sterile Barriers: sterile gloves, mask       Skin prep: Chloraprep (interscalene approach).       Needle Gauge: 20.        Needle Length (Inches): 4        Ultrasound guided       1. Ultrasound was used to identify targeted nerve, plexus, vascular marker, or fascial plane and place a needle adjacent to it in real-time.       2. Ultrasound was used to visualize the spread of anesthetic in close proximity to the above referenced structure.       3. A permanent image is entered into the patient's record.       4. The visualized anatomic structures appeared normal.       5. There were no apparent abnormal pathologic findings.    Assessment/Narrative         The placement was negative for: blood aspirated, painful injection and site bleeding       Bolus given via needle..        Secured via.        Insertion/Infusion Method: Single Shot       Complications: none    Medication(s) Administered   Bupivacaine 0.5% PF (Infiltration) - Infiltration   10 mL - 9/16/2024 6:55:00 AM  Bupivacaine liposome (Exparel) 1.3% LA inj susp (Infiltration) - Infiltration   10 mL - 9/16/2024 6:55:00 AM  Medication Administration Time: 9/16/2024 6:55 AM      FOR [facility] ([facility] [place], [place])  "ONLY:   Pain Team Contact information: please page the Pain Team [facility]. Search \"Pain\". During daytime hours, please page the attending first. At night please page the resident first.      "

## 2024-09-16 NOTE — ANESTHESIA PROCEDURE NOTES
Airway       Patient location during procedure: OR       Procedure Start/Stop Times: 9/16/2024 7:32 AM  Staff -        CRNA: Supa Weiss APRN CRNA       Performed By: CRNA  Consent for Airway        Urgency: elective  Indications and Patient Condition       Indications for airway management: robbin-procedural        Final Airway Details       Final airway type: endotracheal airway       Successful airway: ETT - single  Endotracheal Airway Details        ETT size (mm): 7.0       Cuffed: yes       Cuff volume (mL): 8       Successful intubation technique: video laryngoscopy       VL Blade Size: Glidescope 3       Grade View of Cords: 1       Adjucts: stylet       Position: Right       Measured from: gums/teeth       Secured at (cm): 21       Bite block used: None    Post intubation assessment        Placement verified by: capnometry, equal breath sounds and chest rise        Number of attempts at approach: 1       Number of other approaches attempted: 0       Secured with: tape       Ease of procedure: easy       Dentition: Intact    Medication(s) Administered   Medication Administration Time: 9/16/2024 7:32 AM

## 2024-09-16 NOTE — OP NOTE
Operative Note    Name:  Katie Kramer  :  1950  MRN:  [id number]  Procedure Date:  2024    Preoperative Diagnosis:  Left Shoulder DJD and Rotator cuff tear    Postoperative Diagnosis:  Same with partial thickness tear of the supraspinatus    Procedures:  1.Left Reverse Total Shoulder Arthroplasty  2.Open biceps tenodesis    Surgeon(s):   Marv Chatman MD    Asst.   Laura Yoo PA-C PA-C assistance was required due to the complexity of the procedure, for patient positioning, instrumentation assistance, soft tissue retraction and patient safety.      Circulator: Katie Sánchez RN; Babatunde Judge RN  Relief Scrub: Marilyn Modi  Scrub Person: Marco A Bateman    Anesthesia:   General and Interscalene block with Exparel     Estimated Blood Loss:  100 mL    Findings: DJD Left shoulder     Condition on discharge from OR:  stable    Drains: none     Specimens: None    Tissue Removed, Not Sent: Humeral head    Complications: none     Disposition:  Stable and awake to postanesthesia recovery..    INDICATION FOR OPERATION:  Katie Kramer is a 74 year old female with a history of shoulder pain and stiffness and she has failed nonsurgical treatment. XR showed evidence of severe osteoarthritis of the shoulder. Recommended she undergo shoulder arthroplasty. Risks and benefits of the planned procedure as well as details of surgery were discussed with the patient. Consent was obtained.     REPORT OF OPERATION:   The patient was brought to operating room and placed supine on the operating table. An interscalene block was placed by Anesthesia preoperatively and she was given appropriate preoperative antibiotic. After induction of general anesthesia, she was placed in the beach-chair position on the operating room table. All bony prominences were well padded. The shoulder was prepped and draped in normal sterile fashion.    A standard anterior deltopectoral incision was utilized. Deep retractors were  placed below the clavipectoral fascia and the deltoid. The humeral head was inspected. The rotator cuff was noted to be intact with tendonopathy and partial tear of the anterior supraspinatus.   Biceps was tenodesed to the superior aspect of the pectoralis tendon. Subscapularis was released off the lesser tuberosity and tagged.    The humeral head was exposed and noted to have a moderate humeral head arthritis and anterior and inferior humeral osteophytes, which were resected. Humeral head cut was then performed using appropriate instrumentation. The humeral head protection plate was placed.   The glenoid was then exposed. The labrum was excised circumferentially.  Next, the glenoid was drilled and reamed and prepared for glenoid implant. The 25mm +3 glenoid baseplate was then impacted and the peripheral screws were placed. A trial glenosphere was placed.   The humerus was reamed and broache dnand a humeral trial component was placed.  The 36mm +3 glenosphere implant was then placed.  The humerus was again exposed and size 2+ perform humeral implant was impacted into place.  The humeral tray and polyethelene was then again trialed.  Next a standard humeral polyethelene insert was impacted into place.    A final reduction was performed, and the shoulder was copiously irrigated with saline irrigation. All instruments were removed. Subscapularis was repaired back to the lesser tuberosity with six #2 FiberWire sutures..The incison was again copiously irrigated with saline irrigation and 1 gram of vancomycin powder was placed in the shoulder joint and subdeltoid space.  The incision was closed in layers with #1 Ethibond closure of the deltopectoral split, 0 Vicryl and 2-0 Vicryl subcutaneous closure, and skin staples. A sterile dressing was placed on the shoulder.     Postoperatively she was placed in a shoulder SlingShot brace and  transferred in stable, awake condition to Postanesthesia Recovery.  Following surgery she  will be maintained in the sling for 4-6 weeks postoperatively, may discontinue abduction pillow at 2 week postoperative and begin PT at approximately 3-4 weeks postoperatively.        Marv Chatman MD   Date: 9/16/2024  Time: 9:14 AM    Implants:  Implant Name Type Inv. Item Serial No.  Lot No. LRB No. Used Action   BASEPLATE LATERAL RVRS 25MM OFFS +3MM [id number] - [id number] Total Joint Component/Insert BASEPLATE LATERAL RVRS 25MM OFFS +3MM [id number] [facility]  Left 1 Implanted   Tornier Reversed Glenosphere 36mm +3 Total Joint Component/Insert  [id number] [employer] NA Left 1 Implanted   Tornier humeral sysytem 1/2 36mm Total Joint Component/Insert  [id number] [employer] NA Left 1 Implanted   Tornier humeral stem Total Joint Component/Insert  [id number] [employer] NA Left 1 Implanted   SCREW CENTRAL 6.5X30MM [id number] - [id number] Metallic Hardware/[place] SCREW CENTRAL 6.5X30MM [id number]  [employer] NA Left 1 Implanted   SCREW PERIPHERAL 14MM [id number] - [id number] Metallic Hardware/[place] SCREW PERIPHERAL 14MM [id number]  [employer] NA Left 1 Implanted   SCREW PERIPHERAL 26MM - [id number] Metallic Hardware/[place] SCREW PERIPHERAL 26MM  [employer] NA Left 1 Implanted   SCREW PERIPHERAL 18MM [id number] - [id number] Metallic Hardware/[place] SCREW PERIPHERAL 18MM [id number]  [employer] NA Left 1 Implanted

## 2024-09-16 NOTE — PLAN OF CARE
Patient vital signs are at baseline: Yes  Patient able to ambulate as they were prior to admission or with assist devices provided by therapies during their stay:  Yes  Patient MUST void prior to discharge:  Yes  Patient able to tolerate oral intake:  Yes  Pain has adequate pain control using Oral analgesics:  Yes  Does patient have an identified :  Yes  Has goal D/C date and time been discussed with patient:  Yes - Home tomorrow pending progression

## 2024-09-16 NOTE — ANESTHESIA CARE TRANSFER NOTE
Patient: Katie Kramer    Procedure: Procedure(s):  LEFT REVERSE TOTAL SHOULDER ARTHROPLASTY       Diagnosis: Osteoarthritis of left shoulder [M19.012]  Diagnosis Additional Information: No value filed.    Anesthesia Type:   General     Note:    Oropharynx: oropharynx clear of all foreign objects  Level of Consciousness: awake  Oxygen Supplementation: face mask  Level of Supplemental Oxygen (L/min / FiO2): 6  Independent Airway: airway patency satisfactory and stable  Dentition: dentition unchanged  Vital Signs Stable: post-procedure vital signs reviewed and stable  Report to RN Given: handoff report given  Patient transferred to: PACU    Handoff Report: Identifed the Patient, Identified the Reponsible Provider, Reviewed the pertinent medical history, Discussed the surgical course, Reviewed Intra-OP anesthesia mangement and issues during anesthesia, Set expectations for post-procedure period and Allowed opportunity for questions and acknowledgement of understanding      Vitals:  Vitals Value Taken Time   /84 09/16/24 0920   Temp 35.7  C (96.26  F) 09/16/24 0920   Pulse 78 09/16/24 0920   Resp 19 09/16/24 0920   SpO2 100 % 09/16/24 0920   Vitals shown include unfiled device data.    Electronically Signed By: WINTER Chaudhry CRNA  September 16, 2024  9:21 AM

## 2024-09-16 NOTE — ANESTHESIA POSTPROCEDURE EVALUATION
Patient: Katie Kramer    Procedure: Procedure(s):  LEFT REVERSE TOTAL SHOULDER ARTHROPLASTY       Anesthesia Type:  General    Note:     Postop Pain Control: Uneventful            Sign Out: Well controlled pain   PONV: No   Neuro/Psych: Uneventful            Sign Out: Acceptable/Baseline neuro status   Airway/Respiratory: Uneventful            Sign Out: Acceptable/Baseline resp. status   CV/Hemodynamics: Uneventful            Sign Out: Acceptable CV status; No obvious hypovolemia; No obvious fluid overload   Other NRE:    DID A NON-ROUTINE EVENT OCCUR? No           Last vitals:  Vitals Value Taken Time   /74 09/16/24 1000   Temp 44.3  C (111.74  F) 09/16/24 0937   Pulse 78 09/16/24 1024   Resp 16 09/16/24 1000   SpO2 97 % 09/16/24 1024   Vitals shown include unfiled device data.    Electronically Signed By: Denys Fritz MD  September 16, 2024  1:10 PM

## 2024-09-16 NOTE — PHARMACY-ADMISSION MEDICATION HISTORY
Pharmacist SILVIO Medication History    Admission medication history is complete. The information provided in this note is only as accurate as the sources available at the time of the update.    Medication reconciliation/reorder completed by provider prior to medication history? No    Information Source(s): Patient and Clinic records and Care Everywhere via in-person    Pertinent Information: N/A    Allergies reviewed with patient and updates made in EHR: yes    Medications available for use during hospital stay: None.      Medication History Completed By: Karthik Rodriguez [facility] 9/16/2024 6:38 AM    PTA Med List   Medication Sig Last Dose    amLODIPine (NORVASC) 5 MG tablet Take 1 tablet (5 mg) by mouth every evening 9/15/2024 at PM    amoxicillin (AMOXIL) 500 MG capsule Take 2,000 mg by mouth as needed (dental procedures) Unknown    ARIPiprazole (ABILIFY) 2 MG tablet Take 2 mg by mouth every evening 9/15/2024 at PM    aspirin 81 MG EC tablet Take 81 mg by mouth every evening. 9/8/2024 at PM    atorvastatin (LIPITOR) 40 MG tablet Take 40 mg by mouth at bedtime. 9/15/2024 at PM    buPROPion (WELLBUTRIN SR) 200 MG 12 hr tablet Take 200 mg by mouth daily 9/16/2024 at AM    calcium carbonate 500 mg, elemental, (OSCAL 500) 1250 (500 Ca) MG TABS tablet Take 1 tablet by mouth 2 times daily 9/8/2024 at PM    dapagliflozin (FARXIGA) 10 MG TABS tablet Take 10 mg by mouth daily. 9/11/2024 at AM    ferrous sulfate (FE TABS) 325 (65 Fe) MG EC tablet Take 325 mg by mouth every evening 9/8/2024 at PM    hydrocortisone 2.5 % ointment Apply topically 2 times daily as needed Unknown    LORazepam (ATIVAN) 1 MG tablet Take 1 mg by mouth daily as needed (With dental work). Unknown    losartan (COZAAR) 25 MG tablet Take 1 tablet (25 mg) by mouth every evening 9/15/2024 at PM    metoprolol succinate ER (TOPROL XL) 100 MG 24 hr tablet Take 100 mg by mouth daily 9/16/2024 at AM    metroNIDAZOLE (METROCREAM) 0.75 % external cream Apply  topically 2 times daily as needed (rosacea). Unknown    Multiple Vitamins-Minerals (PRESERVISION/LUTEIN PO) Take 1 tablet by mouth 2 times daily 9/8/2024 at PM    omeprazole-sodium bicarbonate (ZEGERID)  MG per capsule Take 1 capsule by mouth every morning (before breakfast) 9/16/2024 at AM    polyethylene glycol (MIRALAX/GLYCOLAX) powder Take 17 g by mouth nightly as needed Unknown    Polyethylene Glycol 400 (BLINK TEARS OP) Apply 1 drop to eye 3 times daily as needed (dry eyes). Unknown    scopolamine (TRANSDERM) 1 MG/3DAYS 72 hr patch Place 1 patch onto the skin every 72 hours as needed (for travel). Unknown    sertraline (ZOLOFT) 100 MG tablet Take 200 mg by mouth daily 9/16/2024 at AM    spironolactone (ALDACTONE) 25 MG tablet Take 25 mg by mouth daily. 9/15/2024 at AM    Vitamin D, Cholecalciferol, 10 MCG (400 UNIT) TABS Take 800 Units by mouth 2 times daily 9/11/2024 at PM    zolpidem (AMBIEN) 10 MG tablet Take 10 mg by mouth nightly as needed for sleep rare Unknown

## 2024-09-17 ENCOUNTER — APPOINTMENT (OUTPATIENT)
Dept: OCCUPATIONAL THERAPY | Facility: CLINIC | Age: 74
End: 2024-09-17
Attending: ORTHOPAEDIC SURGERY
Payer: MEDICARE

## 2024-09-17 VITALS
WEIGHT: 235 LBS | RESPIRATION RATE: 18 BRPM | DIASTOLIC BLOOD PRESSURE: 65 MMHG | OXYGEN SATURATION: 96 % | TEMPERATURE: 98 F | BODY MASS INDEX: 37.77 KG/M2 | HEIGHT: 66 IN | HEART RATE: 77 BPM | SYSTOLIC BLOOD PRESSURE: 115 MMHG

## 2024-09-17 LAB
CREAT SERPL-MCNC: 0.72 MG/DL (ref 0.51–0.95)
EGFRCR SERPLBLD CKD-EPI 2021: 88 ML/MIN/1.73M2
FASTING STATUS PATIENT QL REPORTED: ABNORMAL
GLUCOSE SERPL-MCNC: 116 MG/DL (ref 70–99)
HGB BLD-MCNC: 12.2 G/DL (ref 11.7–15.7)
POTASSIUM SERPL-SCNC: 4.3 MMOL/L (ref 3.4–5.3)

## 2024-09-17 PROCEDURE — 85018 HEMOGLOBIN: CPT | Performed by: PHYSICIAN ASSISTANT

## 2024-09-17 PROCEDURE — 250N000013 HC RX MED GY IP 250 OP 250 PS 637: Performed by: INTERNAL MEDICINE

## 2024-09-17 PROCEDURE — 99214 OFFICE O/P EST MOD 30 MIN: CPT | Performed by: INTERNAL MEDICINE

## 2024-09-17 PROCEDURE — 82565 ASSAY OF CREATININE: CPT | Performed by: INTERNAL MEDICINE

## 2024-09-17 PROCEDURE — 97166 OT EVAL MOD COMPLEX 45 MIN: CPT | Mod: GO

## 2024-09-17 PROCEDURE — 84132 ASSAY OF SERUM POTASSIUM: CPT | Performed by: INTERNAL MEDICINE

## 2024-09-17 PROCEDURE — 36415 COLL VENOUS BLD VENIPUNCTURE: CPT | Performed by: PHYSICIAN ASSISTANT

## 2024-09-17 PROCEDURE — 97535 SELF CARE MNGMENT TRAINING: CPT | Mod: GO

## 2024-09-17 PROCEDURE — 82947 ASSAY GLUCOSE BLOOD QUANT: CPT | Performed by: ORTHOPAEDIC SURGERY

## 2024-09-17 PROCEDURE — 97110 THERAPEUTIC EXERCISES: CPT | Mod: GO

## 2024-09-17 PROCEDURE — 250N000013 HC RX MED GY IP 250 OP 250 PS 637: Performed by: PHYSICIAN ASSISTANT

## 2024-09-17 RX ORDER — AMOXICILLIN 250 MG
1 CAPSULE ORAL 2 TIMES DAILY
Qty: 30 TABLET | Refills: 0 | Status: SHIPPED | OUTPATIENT
Start: 2024-09-17

## 2024-09-17 RX ORDER — ACETAMINOPHEN 325 MG/1
650 TABLET ORAL EVERY 4 HOURS PRN
Qty: 60 TABLET | Refills: 0 | Status: SHIPPED | OUTPATIENT
Start: 2024-09-19

## 2024-09-17 RX ADMIN — POLYETHYLENE GLYCOL 3350 17 G: 17 POWDER, FOR SOLUTION ORAL at 09:36

## 2024-09-17 RX ADMIN — DAPAGLIFLOZIN 10 MG: 5 TABLET, FILM COATED ORAL at 09:36

## 2024-09-17 RX ADMIN — ACETAMINOPHEN 975 MG: 325 TABLET ORAL at 06:29

## 2024-09-17 RX ADMIN — SERTRALINE 200 MG: 100 TABLET, FILM COATED ORAL at 09:35

## 2024-09-17 RX ADMIN — SPIRONOLACTONE 25 MG: 25 TABLET ORAL at 09:35

## 2024-09-17 RX ADMIN — METOPROLOL SUCCINATE 100 MG: 100 TABLET, EXTENDED RELEASE ORAL at 09:35

## 2024-09-17 RX ADMIN — OXYCODONE HYDROCHLORIDE 5 MG: 5 TABLET ORAL at 06:29

## 2024-09-17 RX ADMIN — BUPROPION HYDROCHLORIDE 200 MG: 100 TABLET, EXTENDED RELEASE ORAL at 09:35

## 2024-09-17 RX ADMIN — ASPIRIN 81 MG: 81 TABLET, COATED ORAL at 09:35

## 2024-09-17 RX ADMIN — PANTOPRAZOLE SODIUM 40 MG: 40 TABLET, DELAYED RELEASE ORAL at 06:29

## 2024-09-17 ASSESSMENT — ACTIVITIES OF DAILY LIVING (ADL)
ADLS_ACUITY_SCORE: 25
ADLS_ACUITY_SCORE: 25
ADLS_ACUITY_SCORE: 24
ADLS_ACUITY_SCORE: 25
IADL_COMMENTS: FAMILY WILL DO UNTIL PT IS RECOVERED
ADLS_ACUITY_SCORE: 25

## 2024-09-17 NOTE — PROGRESS NOTES

## 2024-09-17 NOTE — PROGRESS NOTES
Occupational Therapy Discharge Summary    Reason for therapy discharge:    All goals and outcomes met, no further needs identified.    Progress towards therapy goal(s). See goals on Care Plan in [facility] electronic health record for goal details.  Goals met    Therapy recommendation(s):    No further therapy is recommended.

## 2024-09-17 NOTE — PROGRESS NOTES
Discharge AVS reviewed with patient.  Questions answered and teachings acknowledged.  Belongings and paperwork + 3 paper Rx's sent with via  transport. Orthopedic [employer] acknowledged (YES)

## 2024-09-17 NOTE — PROGRESS NOTES
09/17/24 0830   Appointment Info   Signing Clinician's Name / Credentials (OT) Paola Trujillo, OTR/L   Quick Adds   Quick Adds Certification   Living Environment   People in Home spouse   Current Living Arrangements other (see comments)  (town house)   Living Environment Comments has walk in shower, rts. can get a shower chair   Self-Care   Usual Activity Tolerance good   Current Activity Tolerance moderate   Activity/Exercise/Self-Care Comment Pt was independent with ADLS and IADLS prior to admit   Instrumental Activities of Daily Living (IADL)   IADL Comments Family will do until pt is recovered   General Information   Onset of Illness/Injury or Date of Surgery 09/16/24   Referring Physician Marv Chatman   Patient/Family Therapy Goal Statement (OT) heal well-travel to [place] next Friday with my surgeon's blessing   Additional Occupational Profile Info/Pertinent History of Current Problem Pt here for elective Reverse TSA   Existing Precautions/Restrictions shoulder   Left Upper Extremity (Weight-bearing Status) non weight-bearing (NWB)   Cognitive Status Examination   Affect/Mental Status (Cognitive) WNL   Range of Motion Comprehensive   Comment, General Range of Motion left arm immobilized, right arm WNLs   Bed Mobility   Comment (Bed Mobility) pt will sleep in bed wiht a large wedge.  Cannot sleep in a recliner well   Transfers   Transfer Comments sba   Activities of Daily Living   BADL Assessment/Intervention upper body dressing;lower body dressing;toileting   Upper Body Dressing Assessment/Training   [place] Level (Upper Body Dressing) moderate assist (50% patient effort)   Lower Body Dressing Assessment/Training   [place] Level (Lower Body Dressing) minimum assist (75% patient effort)   Toileting   [place] Level (Toileting) minimum assist (75% patient effort)   Clinical Impression   Criteria for Skilled Therapeutic Interventions Met (OT) Yes, treatment indicated   OT Diagnosis Decreased  independence with adls   OT Problem List-Impairments impacting ADL post-surgical precautions   Assessment of Occupational Performance 3-5 Performance Deficits   Identified Performance Deficits total body dressing,, transfers, toileting   Planned Therapy Interventions (OT) ADL retraining;transfer training;home program guidelines;progressive activity/exercise   Clinical Decision Making Complexity (OT) detailed assessment/moderate complexity   Risk & Benefits of therapy have been explained evaluation/treatment results reviewed;care plan/treatment goals reviewed;risks/benefits reviewed;current/potential barriers reviewed;participants voiced agreement with care plan;participants included;patient   OT Total Evaluation Time   OT Eval, Moderate Complexity Minutes (79018) 15   Therapy Certification   Medical Diagnosis Left reverse TSA   Start of Care Date 09/17/24   Certification date from 09/17/24   Certification date to 10/01/24   OT Goals   Therapy Frequency (OT) One time eval and treatment   OT Goals Upper Body Dressing;Lower Body Dressing;Toilet Transfer/Toileting;OT Goal 1   OT: Upper Body Dressing Modified independent;within precautions;Goal Met;Completed   OT: Lower Body Dressing Modified independent;within precautions;Goal Met;Completed   OT: Toilet Transfer/Toileting Modified independent;toilet transfer;cleaning and garment management;within precautions;Goal Met;Completed   OT: Goal 1 Pt will complete home ex program:   Codman s, elbow, wrist and hand with mod I, met, completed   Interventions   Interventions Quick Adds Self-Care/Home Management;Therapeutic Procedures/Exercise   Self-Care/Home Management   Self-Care/Home Mgmt/ADL, Compensatory, Meal Prep Minutes (54073) 30   Symptoms Noted During/After Treatment (Meal Preparation/Planning Training) none   Treatment Detail/Skilled Intervention Taught pt. how to doff and don immobilizer, dress upper and lower body, complete hygiene tasks, transfer to toilet, bed,  chair, and car all while using renee techniques.   Pt is mod I with all after OT intervention.  Pt will have assist at home as needed from family. Answered questions from pt and spouse. Handouts given. Spouse present for session   Therapeutic Procedures/Exercise   Therapeutic Procedure: strength, endurance, ROM, flexibillity minutes (46222) 15   Symptoms Noted During/After Treatment none   Treatment Detail/Skilled Intervention Taught pt. how to perform Codmans, elbow, wrist and hand AROM ex s.  Gave handout.  Answered questions to pts satisfaction.   OT Discharge Planning   OT Plan Dc OT   OT Discharge Recommendation (DC Rec) other (see comments)  (defer to ortho)   OT Rationale for DC Rec Pt has good support at home   OT Brief overview of current status Pt is mod I with basic adls and functional mobility after OT intervention.   Total Session Time   Timed Code Treatment Minutes 45   Total Session Time (sum of timed and untimed services) 60      [facility]  OUTPATIENT OCCUPATIONAL THERAPY  EVALUATION  PLAN OF TREATMENT FOR OUTPATIENT REHABILITATION  (COMPLETE FOR INITIAL CLAIMS ONLY)  Patient's Last Name, First Name, M.I.  YOB: 1950  Katie Kramer                          Provider's Name  [facility] Medical Record No.  [id number]                             Onset Date:  09/16/24   Start of Care Date:  09/17/24   Type:     ___PT   _X_OT   ___SLP Medical Diagnosis:  Left reverse TSA                    OT Diagnosis:  Decreased independence with adls Visits from SOC:  1     See note for plan of treatment, functional goals and certification details    I CERTIFY THE NEED FOR THESE SERVICES FURNISHED UNDER        THIS PLAN OF TREATMENT AND WHILE UNDER MY CARE     (Physician co-signature of this document indicates review and certification of the therapy plan).

## 2024-09-17 NOTE — PROGRESS NOTES
[facility]    Medicine Progress Note - Hospitalist Service    Date of Admission:  9/16/2024    Assessment & Plan      74-year-old female with left shoulder degenerative joint disease and rotator cuff repair status post left reverse total shoulder arthroplasty and open biceps tenodesis on 9/16/2024.  Hospital medicine was consulted for below medical problems.     Chronic heart failure with preserved ejection fraction  Expectant management  Careful I's and O's Daily weights.  PTA medication: Spironolactone 25 mg daily with hold parameters, dapaglifozen 10 mg daily.  Repeat creatinine and potassium level in AM.     Hypertension, essential  PTA medication: Amlodipine 5 mg daily, losartan 25 mg daily, metoprolol  mg daily  - order creatinine and potassium level for AM  - Hold parameters written, as patient is high risk of post operative hypotension.       Mixed hyperlipidemia  PTA medication: Atorvastatin 40 mg at bedtime     Obstructive sleep apnea  Does not utilize CPAP at home.   Uses soft palate splint.   Continue postoperative oximetry monitoring.      Anxiety and depression  Insomnia  Abilify 2 mg daily, bupropion 400 mg daily, sertraline 200 mg daily, zolpidem 5 mg nightly as needed     Gastroesophageal reflux disease  PTA medication: Zegerid.  Replace with pantoprazole 40 mg daily while inpatient     Mild intermittent asthma  PTA medication: Albuterol inhaler 1 to 2 puffs every 4 hours as needed.     Prediabetes  POCT glucose daily.      History of paroxysmal SVT  Left bundle branch block  Noted on 2/19/2021     Recent UTI treated with Macrobid.           Diet: Advance Diet as Tolerated: Regular Diet Adult  Discharge Instruction - Regular Diet Adult    DVT Prophylaxis: Defer to primary service  Serna Catheter: Not present  Lines: None     Cardiac Monitoring: None  Code Status: Full Code      Clinically Significant Risk Factors Present on Admission                # Drug Induced  "Platelet Defect: home medication list includes an antiplatelet medication   # Hypertension: Noted on problem list         # Obesity: Estimated body mass index is 38.51 kg/m  as calculated from the following:    Height as of this encounter: 1.664 m (5' 5.5\").    Weight as of this encounter: 106.6 kg (235 lb).                    Disposition Plan     Medically Ready for Discharge: Anticipated Today             Mark Hurt DO  Hospitalist Service  [facility]  Securely message with [employer] (more info)  Text page via [employer] Paging/Directory   ______________________________________________________________________    Interval History   Patient was discharged prior to my evaluation.    Physical Exam   Vital Signs: Temp: 98  F (36.7  C) Temp src: Oral BP: 115/65 Pulse: 77   Resp: 18 SpO2: 96 % O2 Device: None (Room air) Oxygen Delivery: 2 LPM  Weight: 235 lbs 0 oz  Medical Decision Making         Data     I have personally reviewed the following data over the past 24 hrs:    N/A  \   12.2   / N/A     N/A N/A N/A /  116 (H)   4.3 N/A 0.72 \       Imaging results reviewed over the past 24 hrs:   Recent Results (from the past 24 hour(s))   XR Shoulder Left Port G/E 2 Views    Narrative    EXAM: XR SHOULDER LEFT PORT G/E 2 VIEWS  LOCATION: [facility]  DATE: 9/16/2024    INDICATION: Status post surgery  COMPARISON: None.      Impression    IMPRESSION:     There are immediate postoperative changes from left reverse total shoulder arthroplasty, in standard alignment. No periprosthetic fracture is evident.     "

## 2024-09-17 NOTE — PLAN OF CARE
Goal Outcome Evaluation:      Plan of Care Reviewed With: patient    Overall Patient Progress: improvingOverall Patient Progress: improving       Patient is A&O x4. Is pleasant and cooperative. VSS. Complains of pain in left shoulder 1/10, scheduled tylenol given and effective. CMS unchanged. Complains of numbness and tingling in LUE from surgical block. Denies nausea, chest pain or shortness of breath. Assist x 1 with gait belt and walker. Adequate intake and output.     Will continue plan of care.     Cristiane Carrasco RN, BSN

## 2024-09-17 NOTE — DISCHARGE SUMMARY
"ORTHOPEDIC DISCHARGE SUMMARY       Katie Kramer,  1950, MRN [id number]    Admission Date: 2024      Admission Diagnoses: Osteoarthritis of left shoulder [M19.012]     Discharge Date: 2024     Post-operative Day:  1 Day Post-Op    Reason for Admission: The patient was admitted for the following: Procedure(s):  LEFT REVERSE TOTAL SHOULDER ARTHROPLASTY    BRIEF HOSPITAL COURSE   Katie Kramer is a pleasant 74 year old female who underwent the aforementioned procedure with Dr. Chatman on 2024. There were no intraoperative complications and the patient was transferred to the recovery room and later the orthopedic unit in stable condition. Once the patient reached the orthopedic floor our orthopedic pain protocol was implemented along with the following:    Anticoagulation Medications: ASA  Therapy: OT  Activity: NWB  Bracing: Slingshot Brace    Consultations during Admission: Hospitalist service for medical management     COMPLICATIONS/SIGNIFICANT FINDINGS    none    DISCHARGE INFORMATION   Condition at discharge: Good  Discharge destination: Home  Patient was seen by myself on the date of discharge.    FOLLOW UP CARE   Follow up with orthopedics in 2 weeks or sooner should the need arise. Ortho will continue to manage pain control, post op anticoagulation and incision care.     Follow up with your PCP for management of chronic medical problems and to evaluate for post op medical complications including constipation, nausea/vomiting, DVT/PE, anemia, changes in blood pressure, fevers/chills, urinary retention and atelectasis/pneumonia.     Subjective   Patient is doing well on POD #1. Tolerating oral intake, pain well controlled on oral medications, voiding without difficulty & ambulating. No BM but passing gas. Ready for discharge. Hgb 12.2.     Physical Exam   /65 (BP Location: Right arm)   Pulse 77   Temp 98  F (36.7  C) (Oral)   Resp 18   Ht 1.664 m (5' 5.5\")   Wt 106.6 kg (235 " lb)   SpO2 96%   BMI 38.51 kg/m    The patient is A&Ox3. Appears comfortable, sitting up at bedside & dressed. Wearing sling appropriately.  Sensation is intact to light touch in LEFT upper extremity, hand & fingers.  LEFT upper extremity motor strength 5/5 in ulnar, median and radial nerve distributions. Fires axillary. Flexes elbow. Flexes & extends wrist. Full composite fist. Opposes thumb.  Palpable 2+ radial pulse. Hand warm with brisk cap refill in fingers.  Calves are soft and non-tender.   Mild edema & ecchymosis of LEFT shoulder. The incision is covered. Dressing C/D/I without strikethrough or surrounding erythema.     Pertinent Results at Discharge     Hemoglobin   Date/Time Value Ref Range Status   09/17/2024 04:25 AM 12.2 11.7 - 15.7 g/dL Final   09/16/2024 06:39 AM 13.3 11.7 - 15.7 g/dL Final   03/29/2023 06:58 AM 11.7 11.7 - 15.7 g/dL Final     INR   Date/Time Value Ref Range Status   09/16/2024 06:39 AM 0.97 0.85 - 1.15 Final   11/14/2019 12:01 AM 0.93 0.90 - 1.10 Final   09/25/2018 10:32 AM 1.01 0.90 - 1.10 Final     Platelet Count   Date/Time Value Ref Range Status   09/16/2024 06:39  150 - 450 10e3/uL Final   12/16/2022 08:55  150 - 450 10e3/uL Final   06/09/2021 07:47  140 - 440 thou/uL Final       Problem List   Active Problems:    S/P reverse total shoulder arthroplasty, left    Status post shoulder surgery      Agustin Brand PA-C/Dr. Chatman  [facility]  [phone]  Date: 9/17/2024  Time: 10:29 AM

## 2024-09-17 NOTE — PROGRESS NOTES
"Orthopedic Progress Note      Assessment: 1 Day Post-Op  S/P Procedure(s):  LEFT REVERSE TOTAL SHOULDER ARTHROPLASTY     Plan:   - Continue OT.  - Weightbearing status: NWB LEFT Upper Extremity  - Continue sling & abduction pillow  - Anticoagulation: Aspirin 81 mg bid x30 days, in addition to SCDs, jossue stockings and early ambulation.  - Discharge planning: Discharge to home. Cleared by OT.    Subjective:  Pain: Left shoulder pain well controlled on Tylenol and aspirin.  Nausea, Vomiting:  No  Chest pain: No  Lightheadedness, Dizziness:  No  Neuro: No new numbness or paresthesias of the LUE.    Patient is doing well on POD #1. Tolerating oral intake, pain well controlled on oral medications, voiding without difficulty & ambulating. No BM but passing gas. Ready for discharge. Hgb 12.2.    Objective:  /65 (BP Location: Right arm)   Pulse 77   Temp 98  F (36.7  C) (Oral)   Resp 18   Ht 1.664 m (5' 5.5\")   Wt 106.6 kg (235 lb)   SpO2 96%   BMI 38.51 kg/m    The patient is A&Ox3. Appears comfortable, sitting up at bedside & dressed. Wearing sling appropriately.  Sensation is intact to light touch in LEFT upper extremity, hand & fingers.  LEFT upper extremity motor strength 5/5 in ulnar, median and radial nerve distributions. Fires axillary. Flexes elbow. Flexes & extends wrist. Full composite fist. Opposes thumb.  Palpable 2+ radial pulse. Hand warm with brisk cap refill in fingers.  Calves are soft and non-tender.   Mild edema & ecchymosis of LEFT shoulder. The incision is covered. Dressing C/D/I without strikethrough or surrounding erythema.        Pertinent Labs   Lab Results: personally reviewed.   Lab Results   Component Value Date    INR 0.97 09/16/2024    INR 0.93 11/14/2019    INR 1.01 09/25/2018     Lab Results   Component Value Date    WBC 7.0 09/16/2024    HGB 12.2 09/17/2024    HCT 39.9 09/16/2024    MCV 89 09/16/2024     09/16/2024     Lab Results   Component Value Date     " 12/16/2022    CO2 25 12/16/2022         Report completed by:  Agustin Brand PA-C/Dr. Chatman  [facility]    Date: 9/17/2024  Time: 10:25 AM

## 2024-11-10 ENCOUNTER — HEALTH MAINTENANCE LETTER (OUTPATIENT)
Age: 74
End: 2024-11-10

## (undated) DEVICE — SUCTION MANIFOLD NEPTUNE 2 SYS 1 PORT 702-025-000

## (undated) DEVICE — SUTURE VICRYL+ 0 27IN CT-1 UND VCP260H

## (undated) DEVICE — SUCTION MANIFOLD NEPTUNE 2 SYS 4 PORT 0702-020-000

## (undated) DEVICE — HOLDER LIMB VELCRO OR 0814-1533

## (undated) DEVICE — GLOVE UNDER INDICATOR PI SZ 7.0 LF 41670

## (undated) DEVICE — CAST PADDING 6" STERILE 9046S

## (undated) DEVICE — PLATE GROUNDING ADULT W/CORD 9165L

## (undated) DEVICE — SU ETHIBOND 5 V-37 4X30" MB66G

## (undated) DEVICE — ELECTRODE PATIENT RETURN ADULT L10 FT 2 PLATE CORD 0855C

## (undated) DEVICE — CLEANSER JET LAVAGE IRRISEPT 0.05% CHG IRRISEPT45USA

## (undated) DEVICE — SU FIBERWIRE 2 38" T-8 NDL  AR-7206

## (undated) DEVICE — BONE CEMENT MIXEVAC HI VAC W/CARTRIDGE 0306-563-000

## (undated) DEVICE — NEEDLE SUTURE ANCHOR 1824-4DC

## (undated) DEVICE — CUSTOM PACK OPEN SHOULDER SOP5BOSHEB

## (undated) DEVICE — Device

## (undated) DEVICE — GLOVE UNDER INDICATOR PI SZ 8.5 LF 41685

## (undated) DEVICE — DRESSING MEPILEX AG SILVER 4X12 395990

## (undated) DEVICE — DECANTER VIAL 2006S

## (undated) DEVICE — BANDAGE ELASTIC 6X550 LF DBL 593-96LF

## (undated) DEVICE — GLOVE BIOGEL PI ULTRATOUCH G SZ 7.5 42175

## (undated) DEVICE — GLOVE BIOGEL PI INDICATOR 8.0 LF 41680

## (undated) DEVICE — GUIDE PIN STERILE 3X75MM

## (undated) DEVICE — SOL NACL 0.9% IRRIG 1000ML BOTTLE 2F7124

## (undated) DEVICE — PREP CHLORAPREP W/ORANGE TINT 10.5ML 930715

## (undated) DEVICE — STPL SKIN PROXIMATE 35 WIDE PMW35

## (undated) DEVICE — GLOVE BIOGEL INDICATOR 7.5 LF 41675

## (undated) DEVICE — SU STRATAFIX PDS PLUS 1 CT-1 18" SXPP1A404

## (undated) DEVICE — BLADE SAGITTAL WIDE (SO-618) 2108-118

## (undated) DEVICE — CUSTOM PACK TOTAL KNEE ACCESSORY SOP5BTAHEA

## (undated) DEVICE — BLADE SAW SAGITTAL STRK XSHORT 25X9.5X0.6MM 2108-145-000

## (undated) DEVICE — GLOVE BIOGEL PI ULTRATOUCH G SZ 6.5 42165

## (undated) DEVICE — BLADE SAW SAGITTAL STRK WIDE 25.4X85X1.2MM 2108-151-000

## (undated) DEVICE — BLADE 5CMX10MM FLT OSTM NS DISPSBLDFLAT-3

## (undated) DEVICE — GLOVE BIOGEL PI ULTRATOUCH G SZ 7.0 42170

## (undated) DEVICE — CUSTOM PACK TOTAL KNEE SOP5BTKHEC

## (undated) DEVICE — A3 SUPPLIES- SEE NURSING INFO PAGE

## (undated) DEVICE — BLADE SAW SAGITTAL STRK SHORT 35.5X9X0.64MM 2108-148-000

## (undated) DEVICE — SU ETHIBOND 2 V-37 4X30" MX69G

## (undated) DEVICE — SOL WATER IRRIG 1000ML BOTTLE 2F7114

## (undated) DEVICE — BLADE 5CMX12MM FLT OSTM NS DISPSBLDFLAT-5

## (undated) DEVICE — GLOVE BIOGEL PI ORTHOPRO SZ 7.5 47675

## (undated) DEVICE — GLOVE BIOGEL PI ULTRATOUCH G SZ 8.5 42185

## (undated) DEVICE — DRILL BIT PERIPHERAL SCREW 3.2MM MWJ126

## (undated) DEVICE — SUTURE VICRYL+ 2-0 27IN CT-1 UND VCP259H

## (undated) DEVICE — SOLUTION IRRIG 2B7127 .9NS 3000ML BAG

## (undated) DEVICE — POSITIONER HEAD ADULT FP-HEADCR

## (undated) DEVICE — GUIDEWIRE TORNIER AEQUALIS PERFORM +  2.5X220MM DWD017

## (undated) RX ORDER — FENTANYL CITRATE 50 UG/ML
INJECTION, SOLUTION INTRAMUSCULAR; INTRAVENOUS
Status: DISPENSED
Start: 2024-09-16

## (undated) RX ORDER — ONDANSETRON 2 MG/ML
INJECTION INTRAMUSCULAR; INTRAVENOUS
Status: DISPENSED
Start: 2022-12-16

## (undated) RX ORDER — PHENYLEPHRINE HYDROCHLORIDE 10 MG/ML
INJECTION INTRAVENOUS
Status: DISPENSED
Start: 2023-03-28

## (undated) RX ORDER — LIDOCAINE HYDROCHLORIDE 10 MG/ML
INJECTION, SOLUTION EPIDURAL; INFILTRATION; INTRACAUDAL; PERINEURAL
Status: DISPENSED
Start: 2022-12-16

## (undated) RX ORDER — LIDOCAINE HYDROCHLORIDE 10 MG/ML
INJECTION, SOLUTION EPIDURAL; INFILTRATION; INTRACAUDAL; PERINEURAL
Status: DISPENSED
Start: 2024-09-16

## (undated) RX ORDER — DEXAMETHASONE SODIUM PHOSPHATE 4 MG/ML
INJECTION, SOLUTION INTRA-ARTICULAR; INTRALESIONAL; INTRAMUSCULAR; INTRAVENOUS; SOFT TISSUE
Status: DISPENSED
Start: 2022-12-16

## (undated) RX ORDER — PROPOFOL 10 MG/ML
INJECTION, EMULSION INTRAVENOUS
Status: DISPENSED
Start: 2022-12-16

## (undated) RX ORDER — FENTANYL CITRATE 50 UG/ML
INJECTION, SOLUTION INTRAMUSCULAR; INTRAVENOUS
Status: DISPENSED
Start: 2022-12-16

## (undated) RX ORDER — PROPOFOL 10 MG/ML
INJECTION, EMULSION INTRAVENOUS
Status: DISPENSED
Start: 2023-03-28

## (undated) RX ORDER — PROPOFOL 10 MG/ML
INJECTION, EMULSION INTRAVENOUS
Status: DISPENSED
Start: 2024-09-16

## (undated) RX ORDER — ONDANSETRON 2 MG/ML
INJECTION INTRAMUSCULAR; INTRAVENOUS
Status: DISPENSED
Start: 2024-09-16

## (undated) RX ORDER — CEFAZOLIN SODIUM 1 G/3ML
INJECTION, POWDER, FOR SOLUTION INTRAMUSCULAR; INTRAVENOUS
Status: DISPENSED
Start: 2022-12-16

## (undated) RX ORDER — ONDANSETRON 2 MG/ML
INJECTION INTRAMUSCULAR; INTRAVENOUS
Status: DISPENSED
Start: 2023-03-28

## (undated) RX ORDER — DEXAMETHASONE SODIUM PHOSPHATE 10 MG/ML
INJECTION, EMULSION INTRAMUSCULAR; INTRAVENOUS
Status: DISPENSED
Start: 2024-09-16

## (undated) RX ORDER — PHENYLEPHRINE HYDROCHLORIDE 10 MG/ML
INJECTION INTRAVENOUS
Status: DISPENSED
Start: 2024-09-16